# Patient Record
Sex: FEMALE | Race: WHITE | Employment: OTHER | URBAN - METROPOLITAN AREA
[De-identification: names, ages, dates, MRNs, and addresses within clinical notes are randomized per-mention and may not be internally consistent; named-entity substitution may affect disease eponyms.]

---

## 2017-01-23 ENCOUNTER — TRANSFERRED RECORDS (OUTPATIENT)
Dept: HEALTH INFORMATION MANAGEMENT | Facility: CLINIC | Age: 82
End: 2017-01-23

## 2017-02-08 ENCOUNTER — OFFICE VISIT (OUTPATIENT)
Dept: INTERNAL MEDICINE | Facility: CLINIC | Age: 82
End: 2017-02-08
Payer: COMMERCIAL

## 2017-02-08 VITALS
RESPIRATION RATE: 12 BRPM | HEART RATE: 72 BPM | BODY MASS INDEX: 25.21 KG/M2 | DIASTOLIC BLOOD PRESSURE: 60 MMHG | TEMPERATURE: 97.8 F | HEIGHT: 62 IN | WEIGHT: 137 LBS | OXYGEN SATURATION: 98 % | SYSTOLIC BLOOD PRESSURE: 124 MMHG

## 2017-02-08 DIAGNOSIS — I10 BENIGN HYPERTENSION: Primary | ICD-10-CM

## 2017-02-08 DIAGNOSIS — H61.21 IMPACTED CERUMEN OF RIGHT EAR: ICD-10-CM

## 2017-02-08 DIAGNOSIS — R09.81 NASAL CONGESTION: ICD-10-CM

## 2017-02-08 PROCEDURE — 99214 OFFICE O/P EST MOD 30 MIN: CPT | Performed by: FAMILY MEDICINE

## 2017-02-08 RX ORDER — HYDROCHLOROTHIAZIDE 25 MG/1
25 TABLET ORAL DAILY
Qty: 30 TABLET | Refills: 1 | Status: SHIPPED | OUTPATIENT
Start: 2017-02-08 | End: 2017-03-16

## 2017-02-08 RX ORDER — LORATADINE 10 MG/1
10 TABLET ORAL DAILY
Qty: 30 TABLET | Refills: 1 | COMMUNITY
Start: 2017-02-08 | End: 2017-04-10

## 2017-02-08 RX ORDER — METOPROLOL SUCCINATE 25 MG/1
25 TABLET, EXTENDED RELEASE ORAL DAILY
Qty: 30 TABLET | Refills: 1 | Status: CANCELLED | OUTPATIENT
Start: 2017-02-08

## 2017-02-08 NOTE — PROGRESS NOTES
Chief Complaint   Patient presents with     RECHECK     Follow up to January 2017 office visit. No change in sx.

## 2017-02-08 NOTE — PROGRESS NOTES
"CHIEF COMPLAINT    Head congestion, post nasal drip, hoarse      HISTORY    She has had persistent although somewhat improved SX. I saw her about 2 months ago. Singular, Calritin recommended.    Note she is on Losartan having been switched from Lisinopril due to cough.    She has been running lowish BP's.    R ear plugged.    Patient Active Problem List   Diagnosis     Advanced directives, counseling/discussion     Benign hypertension     Hyperlipidemia LDL goal <130     Neuropathy (H)     Preventative health care     Osteopenia     Abnormal finding on thyroid function test     Thyroid nodule     Subclinical hypothyroidism       REVIEW OF SYSTEMS    No fever  No HA  No wheeze or SOB  No edema      Past Medical History   Diagnosis Date     Hypertension      Hyperlipidemia      Neuropathy (H)      Abnormal finding on thyroid function test 11/10/2015     Thyroid nodule 11/10/2015     Subclinical hypothyroidism 11/10/2015       EXAM  /60 mmHg  Pulse 72  Temp(Src) 97.8  F (36.6  C) (Oral)  Resp 12  Ht 5' 2\" (1.575 m)  Wt 137 lb (62.143 kg)  BMI 25.05 kg/m2  SpO2 98%    R ear: impacted cerumen, irrigated, TM is WNL  L ear: WNL  Phar: neg  Voice sl hoarse  Neck: no mass or adenopathy  Chest: no distress      (I10) Benign hypertension  (primary encounter diagnosis)  Comment:   Will D/C Losartan on trial basis  Plan: hydrochlorothiazide (HYDRODIURIL) 25 MG tablet            (R09.81) Nasal congestion  Comment: see above, persistent  Plan: ENT if not improving    (H61.21) Impacted cerumen of right ear  Comment: resolved  Plan:       Note change of medication.    Recheck in 3-4 weeks.      "

## 2017-02-08 NOTE — NURSING NOTE
"Chief Complaint   Patient presents with     RECHECK     Follow up to January 2017 office visit. No change in sx.        Initial /60 mmHg  Pulse 72  Temp(Src) 97.8  F (36.6  C) (Oral)  Resp 12  Ht 5' 2\" (1.575 m)  Wt 137 lb (62.143 kg)  BMI 25.05 kg/m2  SpO2 98% Estimated body mass index is 25.05 kg/(m^2) as calculated from the following:    Height as of this encounter: 5' 2\" (1.575 m).    Weight as of this encounter: 137 lb (62.143 kg).  Medication Reconciliation: complete     MARIIA Santiago      "

## 2017-02-08 NOTE — MR AVS SNAPSHOT
"              After Visit Summary   2017    Vicky Kang    MRN: [id number]           Patient Information     Date Of Birth          1935        Visit Information        Provider Department      2017 9:20 AM Boyd Mcdonnell MD [facility]        Today's Diagnoses     Benign hypertension    -  1     Nasal congestion         Impacted cerumen of right ear           Care Instructions    Note change of medication.    Recheck in 3-4 weeks.          Follow-ups after your visit        Who to contact     If you have questions or need follow up information about today's clinic visit or your schedule please contact [facility] directly at [phone].  Normal or non-critical lab and imaging results will be communicated to you by MyChart, letter or phone within 4 business days after the clinic has received the results. If you do not hear from us within 7 days, please contact the clinic through MyChart or phone. If you have a critical or abnormal lab result, we will notify you by phone as soon as possible.  Submit refill requests through Minekey or call your pharmacy and they will forward the refill request to us. Please allow 3 business days for your refill to be completed.          Additional Information About Your Visit        MyChart Information     Minekey lets you send messages to your doctor, view your test results, renew your prescriptions, schedule appointments and more. To sign up, go to [email or web address] . Click on \"Log in\" on the left side of the screen, which will take you to the Welcome page. Then click on \"Sign up Now\" on the right side of the page.     You will be asked to enter the access code listed below, as well as some personal information. Please follow the directions to create your username and password.     Your access code is: [email or web address]  Expires: 2017  9:43 AM     Your access code will  in 90 days. If you need help or a new code, " "please call your [facility] clinic or [phone].        Care EveryWhere ID     This is your Care EveryWhere ID. This could be used by other organizations to access your [facility] medical records  [id number]        Your Vitals Were     Pulse Temperature Respirations Height BMI (Body Mass Index) Pulse Oximetry    72 97.8  F (36.6  C) (Oral) 12 5' 2\" (1.575 m) 25.05 kg/m2 98%       Blood Pressure from Last 3 Encounters:   02/08/17 124/60   12/12/16 124/72   09/30/16 124/64    Weight from Last 3 Encounters:   02/08/17 137 lb (62.143 kg)   12/12/16 138 lb (62.596 kg)   09/30/16 136 lb (61.689 kg)              Today, you had the following     No orders found for display         Today's Medication Changes          These changes are accurate as of: 2/8/17  9:43 AM.  If you have any questions, ask your nurse or doctor.               Start taking these medicines.        Dose/Directions    hydrochlorothiazide 25 MG tablet   Commonly known as:  HYDRODIURIL   Used for:  Benign hypertension   Replaces:  losartan-hydrochlorothiazide 50-12.5 MG per tablet   Started by:  Boyd Mcdonnell MD        Dose:  25 mg   Take 1 tablet (25 mg) by mouth daily   Quantity:  30 tablet   Refills:  1         Stop taking these medicines if you haven't already. Please contact your care team if you have questions.     azelastine 0.1 % spray   Commonly known as:  ASTELIN   Stopped by:  Boyd Mcdonnell MD           fluticasone 50 MCG/ACT spray   Commonly known as:  FLONASE   Stopped by:  Boyd Mcdonnell MD           losartan-hydrochlorothiazide 50-12.5 MG per tablet   Commonly known as:  HYZAAR   Replaced by:  hydrochlorothiazide 25 MG tablet   Stopped by:  Boyd Mcdonnell MD                Where to get your medicines      These medications were sent to [facility] [address] [place] [address] - [id number] [place] [address]  [id number] [place] [address]     Phone:  [phone]    - hydrochlorothiazide 25 MG tablet             " Primary Care Provider Office Phone # Fax #    Claire Gross [phone]       [facility] [address] [facility] [address]        Thank you!     Thank you for choosing [facility]  for your care. Our goal is always to provide you with excellent care. Hearing back from our patients is one way we can continue to improve our services. Please take a few minutes to complete the written survey that you may receive in the mail after your visit with us. Thank you!             Your Updated Medication List - Protect others around you: Learn how to safely use, store and throw away your medicines at www.disposemymeds.org.          This list is accurate as of: 2/8/17  9:43 AM.  Always use your most recent med list.                   Brand Name Dispense Instructions for use    amLODIPine 5 MG tablet    NORVASC    90 tablet    Take 1 tablet (5 mg) by mouth daily       aspirin 81 MG tablet      Take  by mouth daily.       CALCIUM 600 + D PO      Take  by mouth 2 times daily.       CLARITIN 10 MG tablet   Generic drug:  loratadine     30 tablet    Take 1 tablet (10 mg) by mouth daily       hydrochlorothiazide 25 MG tablet    HYDRODIURIL    30 tablet    Take 1 tablet (25 mg) by mouth daily       metoprolol 50 MG 24 hr tablet    TOPROL-XL    90 tablet    Take 1 tablet (50 mg) by mouth daily       montelukast 10 MG tablet    SINGULAIR    30 tablet    Take 1 tablet (10 mg) by mouth At Bedtime       simvastatin 20 MG tablet    ZOCOR    90 tablet    Take 1 tablet (20 mg) by mouth At Bedtime

## 2017-03-16 ENCOUNTER — OFFICE VISIT (OUTPATIENT)
Dept: INTERNAL MEDICINE | Facility: CLINIC | Age: 82
End: 2017-03-16
Payer: COMMERCIAL

## 2017-03-16 VITALS
OXYGEN SATURATION: 93 % | WEIGHT: 140 LBS | BODY MASS INDEX: 25.76 KG/M2 | SYSTOLIC BLOOD PRESSURE: 120 MMHG | HEIGHT: 62 IN | TEMPERATURE: 97.9 F | DIASTOLIC BLOOD PRESSURE: 60 MMHG | HEART RATE: 88 BPM

## 2017-03-16 DIAGNOSIS — I10 BENIGN HYPERTENSION: ICD-10-CM

## 2017-03-16 DIAGNOSIS — R49.0 HOARSENESS: Primary | ICD-10-CM

## 2017-03-16 DIAGNOSIS — R09.82 POST-NASAL DRIP: ICD-10-CM

## 2017-03-16 PROCEDURE — 99213 OFFICE O/P EST LOW 20 MIN: CPT | Performed by: FAMILY MEDICINE

## 2017-03-16 RX ORDER — MONTELUKAST SODIUM 10 MG/1
10 TABLET ORAL AT BEDTIME
Qty: 30 TABLET | Refills: 5 | Status: SHIPPED | OUTPATIENT
Start: 2017-03-16 | End: 2018-09-26

## 2017-03-16 RX ORDER — HYDROCHLOROTHIAZIDE 25 MG/1
25 TABLET ORAL DAILY
Qty: 30 TABLET | Refills: 5 | Status: SHIPPED | OUTPATIENT
Start: 2017-03-16 | End: 2017-10-30

## 2017-03-16 NOTE — NURSING NOTE
"Chief Complaint   Patient presents with     RECHECK       Initial /60  Pulse 88  Temp 97.9  F (36.6  C) (Oral)  Ht 5' 2\" (1.575 m)  Wt 140 lb (63.5 kg)  SpO2 93%  BMI 25.61 kg/m2 Estimated body mass index is 25.61 kg/(m^2) as calculated from the following:    Height as of this encounter: 5' 2\" (1.575 m).    Weight as of this encounter: 140 lb (63.5 kg).  Medication Reconciliation: complete    "

## 2017-03-16 NOTE — MR AVS SNAPSHOT
After Visit Summary   3/16/2017    Vicky Kang    MRN: [id number]           Patient Information     Date Of Birth          1935        Visit Information        Provider Department      3/16/2017 10:40 AM Boyd Mcdonnell MD [facility]        Today's Diagnoses     Hoarseness    -  1    Benign hypertension        Post-nasal drip          Care Instructions      Continue present meds.    See ENT Doctor.        Follow-ups after your visit        Additional Services     OTOLARYNGOLOGY REFERRAL       Your provider has referred you to: [facility]: [facility] [phone]   [email or web address]    Please be aware that coverage of these services is subject to the terms and limitations of your health insurance plan.  Call member services at your health plan with any benefit or coverage questions.      Please bring the following with you to your appointment:    (1) Any X-Rays, CTs or MRIs which have been performed.  Contact the facility where they were done to arrange for  prior to your scheduled appointment.   (2) List of current medications  (3) This referral request   (4) Any documents/labs given to you for this referral                  Who to contact     If you have questions or need follow up information about today's clinic visit or your schedule please contact [facility] directly at [phone].  Normal or non-critical lab and imaging results will be communicated to you by MyChart, letter or phone within 4 business days after the clinic has received the results. If you do not hear from us within 7 days, please contact the clinic through MyChart or phone. If you have a critical or abnormal lab result, we will notify you by phone as soon as possible.  Submit refill requests through StepOne or call your pharmacy and they will forward the refill request to us. Please allow 3 business days for your refill to be  "completed.          Additional Information About Your Visit        SNUPI TechnologiesharWalkMe Information     [facility] How To lets you send messages to your doctor, view your test results, renew your prescriptions, schedule appointments and more. To sign up, go to [email or web address] . Click on \"Log in\" on the left side of the screen, which will take you to the Welcome page. Then click on \"Sign up Now\" on the right side of the page.     You will be asked to enter the access code listed below, as well as some personal information. Please follow the directions to create your username and password.     Your access code is: [email or web address]  Expires: 2017 10:43 AM     Your access code will  in 90 days. If you need help or a new code, please call your [facility] or [phone].        Care EveryWhere ID     This is your [facility] EveryWhere ID. This could be used by other organizations to access your [facility] medical records  [id number]        Your Vitals Were     Pulse Temperature Height Pulse Oximetry BMI (Body Mass Index)       88 97.9  F (36.6  C) (Oral) 5' 2\" (1.575 m) 93% 25.61 kg/m2        Blood Pressure from Last 3 Encounters:   17 120/60   17 124/60   16 124/72    Weight from Last 3 Encounters:   17 140 lb (63.5 kg)   17 137 lb (62.1 kg)   16 138 lb (62.6 kg)              We Performed the Following     OTOLARYNGOLOGY REFERRAL          Where to get your medicines      These medications were sent to [facility] [address] [place] [id number] [facility]  [address] [facility] [address]     Phone:  [phone]     hydrochlorothiazide 25 MG tablet    montelukast 10 MG tablet          Primary Care Provider Office Phone # Fax #    Claire Gross [phone]       [facility] [address] [place] [address]        Thank you!     Thank you for choosing [facility]  for your care. Our goal is always to provide you with " excellent care. Hearing back from our patients is one way we can continue to improve our services. Please take a few minutes to complete the written survey that you may receive in the mail after your visit with us. Thank you!             Your Updated Medication List - Protect others around you: Learn how to safely use, store and throw away your medicines at www.disposemymeds.org.          This list is accurate as of: 3/16/17 11:03 AM.  Always use your most recent med list.                   Brand Name Dispense Instructions for use    amLODIPine 5 MG tablet    NORVASC    90 tablet    Take 1 tablet (5 mg) by mouth daily       CALCIUM 600 + D PO      Take  by mouth 2 times daily.       CLARITIN 10 MG tablet   Generic drug:  loratadine     30 tablet    Take 1 tablet (10 mg) by mouth daily       hydrochlorothiazide 25 MG tablet    HYDRODIURIL    30 tablet    Take 1 tablet (25 mg) by mouth daily       metoprolol 50 MG 24 hr tablet    TOPROL-XL    90 tablet    Take 1 tablet (50 mg) by mouth daily       montelukast 10 MG tablet    SINGULAIR    30 tablet    Take 1 tablet (10 mg) by mouth At Bedtime       simvastatin 20 MG tablet    ZOCOR    90 tablet    Take 1 tablet (20 mg) by mouth At Bedtime

## 2017-03-20 NOTE — PROGRESS NOTES
"CHIEF COMPLAINT    Head congestion, hoarseness      HISTORY    Vicky has had SX for 4-5 months. She has post nasal and nasal drainage. Somewhat better with Singulair, Antihistamine. She notes persistent hoarseness. No sinus HA. No ST    Patient Active Problem List   Diagnosis     Advanced directives, counseling/discussion     Benign hypertension     Hyperlipidemia LDL goal <130     Neuropathy (H)     Preventative health care     Osteopenia     Abnormal finding on thyroid function test     Thyroid nodule     Subclinical hypothyroidism     Current Outpatient Prescriptions   Medication Sig Dispense Refill     hydrochlorothiazide (HYDRODIURIL) 25 MG tablet Take 1 tablet (25 mg) by mouth daily 30 tablet 5     montelukast (SINGULAIR) 10 MG tablet Take 1 tablet (10 mg) by mouth At Bedtime 30 tablet 5     loratadine (CLARITIN) 10 MG tablet Take 1 tablet (10 mg) by mouth daily 30 tablet 1     metoprolol (TOPROL-XL) 50 MG 24 hr tablet Take 1 tablet (50 mg) by mouth daily 90 tablet 2     amLODIPine (NORVASC) 5 MG tablet Take 1 tablet (5 mg) by mouth daily 90 tablet 2     simvastatin (ZOCOR) 20 MG tablet Take 1 tablet (20 mg) by mouth At Bedtime 90 tablet 3     Calcium Carbonate-Vitamin D (CALCIUM 600 + D OR) Take  by mouth 2 times daily.         REVIEW OF SYSTEMS    No fever  No wheeze or SOB      Past Medical History   Diagnosis Date     Abnormal finding on thyroid function test 11/10/2015     Hyperlipidemia      Hypertension      Neuropathy (H)      Subclinical hypothyroidism 11/10/2015     Thyroid nodule 11/10/2015       EXAM  /60  Pulse 88  Temp 97.9  F (36.6  C) (Oral)  Ht 5' 2\" (1.575 m)  Wt 140 lb (63.5 kg)  SpO2 93%  BMI 25.61 kg/m2    Congested nasal membranes  Pharynx unremarkable   Neck no adenopathy  Voice is sl hoarse      (R49.0) Hoarseness  (primary encounter diagnosis)  Comment:   Plan: OTOLARYNGOLOGY REFERRAL            (I10) Benign hypertension  Comment: refill  Plan: hydrochlorothiazide (HYDRODIURIL) " 25 MG tablet            (R09.82) Post-nasal drip  Comment:   Plan: montelukast (SINGULAIR) 10 MG tablet,         OTOLARYNGOLOGY REFERRAL          Continue present meds.    See ENT Doctor.

## 2017-03-31 ENCOUNTER — TRANSFERRED RECORDS (OUTPATIENT)
Dept: HEALTH INFORMATION MANAGEMENT | Facility: CLINIC | Age: 82
End: 2017-03-31

## 2017-04-10 ENCOUNTER — OFFICE VISIT (OUTPATIENT)
Dept: INTERNAL MEDICINE | Facility: CLINIC | Age: 82
End: 2017-04-10
Payer: COMMERCIAL

## 2017-04-10 VITALS
WEIGHT: 139 LBS | HEART RATE: 95 BPM | BODY MASS INDEX: 25.58 KG/M2 | SYSTOLIC BLOOD PRESSURE: 112 MMHG | TEMPERATURE: 98.4 F | DIASTOLIC BLOOD PRESSURE: 72 MMHG | HEIGHT: 62 IN | OXYGEN SATURATION: 94 %

## 2017-04-10 DIAGNOSIS — Z01.818 PREOPERATIVE EXAMINATION: Primary | ICD-10-CM

## 2017-04-10 LAB — HGB BLD-MCNC: 13.7 G/DL (ref 11.7–15.7)

## 2017-04-10 PROCEDURE — 80051 ELECTROLYTE PANEL: CPT | Performed by: INTERNAL MEDICINE

## 2017-04-10 PROCEDURE — 82565 ASSAY OF CREATININE: CPT | Performed by: INTERNAL MEDICINE

## 2017-04-10 PROCEDURE — 93000 ELECTROCARDIOGRAM COMPLETE: CPT | Performed by: INTERNAL MEDICINE

## 2017-04-10 PROCEDURE — 84520 ASSAY OF UREA NITROGEN: CPT | Performed by: INTERNAL MEDICINE

## 2017-04-10 PROCEDURE — 99215 OFFICE O/P EST HI 40 MIN: CPT | Performed by: INTERNAL MEDICINE

## 2017-04-10 PROCEDURE — 36415 COLL VENOUS BLD VENIPUNCTURE: CPT | Performed by: INTERNAL MEDICINE

## 2017-04-10 PROCEDURE — 85018 HEMOGLOBIN: CPT | Performed by: INTERNAL MEDICINE

## 2017-04-10 NOTE — MR AVS SNAPSHOT
"              After Visit Summary   4/10/2017    Vicky Kang    MRN: [id number]           Patient Information     Date Of Birth          1935        Visit Information        Provider Department      4/10/2017 11:20 AM Mary Jackson MD [facility]        Today's Diagnoses     Preoperative examination    -  1       Follow-ups after your visit        Who to contact     If you have questions or need follow up information about today's clinic visit or your schedule please contact [facility] directly at [phone].  Normal or non-critical lab and imaging results will be communicated to you by Netcipiahart, letter or phone within 4 business days after the clinic has received the results. If you do not hear from us within 7 days, please contact the clinic through Netcipiahart or phone. If you have a critical or abnormal lab result, we will notify you by phone as soon as possible.  Submit refill requests through Wiki-PR or call your pharmacy and they will forward the refill request to us. Please allow 3 business days for your refill to be completed.          Additional Information About Your Visit        MyChart Information     Wiki-PR lets you send messages to your doctor, view your test results, renew your prescriptions, schedule appointments and more. To sign up, go to [email or web address] . Click on \"Log in\" on the left side of the screen, which will take you to the Welcome page. Then click on \"Sign up Now\" on the right side of the page.     You will be asked to enter the access code listed below, as well as some personal information. Please follow the directions to create your username and password.     Your access code is: [email or web address]  Expires: 2017 10:43 AM     Your access code will  in 90 days. If you need help or a new code, please call your [facility] or [phone].        Care EveryWhere ID     This is your Care EveryWhere ID. This could be used by " "other organizations to access your [place] medical records  [id number]        Your Vitals Were     Pulse Temperature Height Pulse Oximetry Breastfeeding? BMI (Body Mass Index)    95 98.4  F (36.9  C) (Oral) 5' 2\" (1.575 m) 94% No 25.42 kg/m2       Blood Pressure from Last 3 Encounters:   04/10/17 112/72   03/16/17 120/60   02/08/17 124/60    Weight from Last 3 Encounters:   04/10/17 139 lb (63 kg)   03/16/17 140 lb (63.5 kg)   02/08/17 137 lb (62.1 kg)              We Performed the Following     Creatinine     EKG 12-lead complete w/read - Clinics     Electrolyte panel     Hemoglobin     Urea nitrogen          Today's Medication Changes          These changes are accurate as of: 4/10/17 12:06 PM.  If you have any questions, ask your nurse or doctor.               Stop taking these medicines if you haven't already. Please contact your care team if you have questions.     CLARITIN 10 MG tablet   Generic drug:  loratadine   Stopped by:  Mary Jackson MD                    Primary Care Provider Office Phone # Fax #    Claire Hira Gross [phone]       [facility] [address] [place] [address]        Thank you!     Thank you for choosing [facility]  for your care. Our goal is always to provide you with excellent care. Hearing back from our patients is one way we can continue to improve our services. Please take a few minutes to complete the written survey that you may receive in the mail after your visit with us. Thank you!             Your Updated Medication List - Protect others around you: Learn how to safely use, store and throw away your medicines at www.disposemymeds.org.          This list is accurate as of: 4/10/17 12:06 PM.  Always use your most recent med list.                   Brand Name Dispense Instructions for use    amLODIPine 5 MG tablet    NORVASC    90 tablet    Take 1 tablet (5 mg) by mouth daily       CALCIUM 600 + D PO      " Take  by mouth 2 times daily.       hydrochlorothiazide 25 MG tablet    HYDRODIURIL    30 tablet    Take 1 tablet (25 mg) by mouth daily       metoprolol 50 MG 24 hr tablet    TOPROL-XL    90 tablet    Take 1 tablet (50 mg) by mouth daily       montelukast 10 MG tablet    SINGULAIR    30 tablet    Take 1 tablet (10 mg) by mouth At Bedtime       simvastatin 20 MG tablet    ZOCOR    90 tablet    Take 1 tablet (20 mg) by mouth At Bedtime

## 2017-04-10 NOTE — PROGRESS NOTES
[place] [address]  [facility] [address]  [phone]  Dept: [phone]    PRE-OP EVALUATION:  Today's date: 4/10/2017    Vicky Kang (: 1935) presents for pre-operative evaluation assessment as requested by Dr. Gavin Sharma.  She requires evaluation and anesthesia risk assessment prior to undergoing surgery/procedure for treatment of Hoarseness .  Proposed procedure: vocal cord cyst/polyp removal    Date of Surgery/ Procedure: 17  Time of Surgery/ Procedure: [address] [place] [address]/Surgical Facility: [facility]  Primary Physician: Claire Gross  Type of Anesthesia Anticipated: General    Patient has a Health Care Directive or Living Will:  YES     1. NO - Do you have a history of heart attack, stroke, stent, bypass or surgery on an artery in the head, neck, heart or legs?  2. NO - Do you ever have any pain or discomfort in your chest?  3. NO - Do you have a history of  Heart Failure?  4. NO - Are you troubled by shortness of breath when: walking on the level, up a slight hill or at night?  5. NO - Do you currently have a cold, bronchitis or other respiratory infection?  6. NO - Do you have a cough, shortness of breath or wheezing?  7. NO - Do you sometimes get pains in the calves of your legs when you walk?  8. NO - Do you or anyone in your family have previous history of blood clots?  9. NO - Do you or does anyone in your family have a serious bleeding problem such as prolonged bleeding following surgeries or cuts?  10. NO - Have you ever had problems with anemia or been told to take iron pills?  11. NO - Have you had any abnormal blood loss such as black, tarry or bloody stools, or abnormal vaginal bleeding?  12. NO - Have you ever had a blood transfusion?  13. NO - Have you or any of your relatives ever had problems with anesthesia?  14. NO - Do you have sleep apnea, excessive snoring or daytime drowsiness?  15. NO - Do you have any prosthetic  heart valves?  16. NO - Do you have prosthetic joints?  17. NO - Is there any chance that you may be pregnant?      HPI:                                                          HYPERTENSION - Patient has longstanding history of  HTN , currently denies any symptoms referable to elevated blood pressure. Specifically denies chest pain, palpitations, dyspnea, orthopnea, PND or peripheral edema. Blood pressure readings have been in normal range. Current medication regimen is as listed below. Patient denies any side effects of medication.                                                                                                                                                                                          .  HYPERLIPIDEMIA - Patient has a  history of  Hyperlipidemia requiring medication for treatment,. Patient reports no problems or side effects with the medication.                                                                                                                                                       .  HYPOTHYROIDISM - Patient has a longstanding history of chronic Hypothyroidism. Patient has been doing well, noting no tremor, insomnia, hair loss or changes in skin texture. Last TSH value of 2.20 ( 09/16) . Continues to take medications as directed, without adverse reactions or side effects.                                                                                                                                                                                                                        .    MEDICAL HISTORY:                                                      Patient Active Problem List    Diagnosis Date Noted     Abnormal finding on thyroid function test 11/10/2015     Priority: Medium     Thyroid nodule 11/10/2015     Priority: Medium     Subclinical hypothyroidism 11/10/2015     Priority: Medium     Advanced directives, counseling/discussion 04/24/2013     Priority: Medium      Patient states has Advance Directive and will bring in a copy to clinic.       Benign hypertension 04/24/2013     Priority: Medium     Hyperlipidemia LDL goal <130 04/24/2013     Priority: Medium     Neuropathy (H) 04/24/2013     Priority: Medium     Preventative health care 04/24/2013     Priority: Medium     Osteopenia 04/24/2013     Priority: Medium        Past Medical History:   Diagnosis Date     Abnormal finding on thyroid function test 11/10/2015     Hyperlipidemia      Hypertension      Neuropathy (H)      Subclinical hypothyroidism 11/10/2015     Thyroid nodule 11/10/2015       Past Surgical History:   Procedure Laterality Date     CATARACT IOL, RT/LT       PHACOEMULSIFICATION CLEAR CORNEA WITH STANDARD INTRAOCULAR LENS IMPLANT  1/5/2012    Procedure:PHACOEMULSIFICATION CLEAR CORNEA WITH STANDARD INTRAOCULAR LENS IMPLANT; LEFT PHACOEMULSIFICATION CLEAR CORNEA WITH STANDARD LENS IMPLANT ; Surgeon:TUNDE GARDNER; Location: [facility]     VITRECTOMY ANTERIOR         Current Outpatient Prescriptions   Medication Sig Dispense Refill     hydrochlorothiazide (HYDRODIURIL) 25 MG tablet Take 1 tablet (25 mg) by mouth daily 30 tablet 5     montelukast (SINGULAIR) 10 MG tablet Take 1 tablet (10 mg) by mouth At Bedtime 30 tablet 5     loratadine (CLARITIN) 10 MG tablet Take 1 tablet (10 mg) by mouth daily 30 tablet 1     metoprolol (TOPROL-XL) 50 MG 24 hr tablet Take 1 tablet (50 mg) by mouth daily 90 tablet 2     amLODIPine (NORVASC) 5 MG tablet Take 1 tablet (5 mg) by mouth daily 90 tablet 2     simvastatin (ZOCOR) 20 MG tablet Take 1 tablet (20 mg) by mouth At Bedtime 90 tablet 3     Calcium Carbonate-Vitamin D (CALCIUM 600 + D OR) Take  by mouth 2 times daily.         OTC products: None, except as noted above      No Known Allergies       Latex Allergy: NO    Social History   Substance Use Topics     Smoking status: Never Smoker     Smokeless tobacco: Never Used     Alcohol use No     History   Drug Use No  "      REVIEW OF SYSTEMS:                                                    C: NEGATIVE for fever, chills, change in weight  I: NEGATIVE for worrisome rashes, moles or lesions  E: NEGATIVE for vision changes or irritation  E/M: NEGATIVE for ear, mouth and throat problems  R: NEGATIVE for significant cough or SOB  B: NEGATIVE for masses, tenderness or discharge  CV: NEGATIVE for chest pain, palpitations or peripheral edema  GI: NEGATIVE for nausea, abdominal pain, heartburn, or change in bowel habits  : NEGATIVE for frequency, dysuria, or hematuria  M: NEGATIVE for significant arthralgias or myalgia  N: NEGATIVE for weakness, dizziness or paresthesias  E: NEGATIVE for temperature intolerance, skin/hair changes  H: NEGATIVE for bleeding problems  P: NEGATIVE for changes in mood or affect    EXAM:                                                    /72 (BP Location: Right arm, Cuff Size: Adult Regular)  Pulse 95  Temp 98.4  F (36.9  C) (Oral)  Ht 5' 2\" (1.575 m)  Wt 139 lb (63 kg)  SpO2 94%  Breastfeeding? No  BMI 25.42 kg/m2    GENERAL APPEARANCE: healthy, alert and no distress     EYES: EOMI, PERRL     HENT: ear canals and TM's normal and nose and mouth without ulcers or lesions     NECK: no adenopathy, no asymmetry, masses, or scars and thyroid normal to palpation     RESP: lungs clear to auscultation - no rales, rhonchi or wheezes     CV: regular rates and rhythm, normal S1 S2, no S3 or S4 and no murmur, click or rub     ABDOMEN:  soft, nontender, no HSM or masses and bowel sounds normal     MS: extremities normal- no gross deformities noted, no evidence of inflammation in joints, FROM in all extremities.     NEURO: Normal strength and tone, sensory exam grossly normal, mentation intact and speech normal     PSYCH: mentation appears normal. and affect normal/bright       DIAGNOSTICS:                                                    EKG: appears normal, NSR, normal axis, normal intervals, no LVH by " voltage criteria, unchanged from previous tracings  Hemoglobin pending  Serum Potassium pending    Recent Labs   Lab Test  09/30/16   1550  08/16/15   0730  07/16/15   1603   HGB   --   15.3  14.9   PLT   --   289  319   NA  139  140   --    POTASSIUM  3.7  3.4   --    CR  0.67  0.59   --         IMPRESSION:                                                        (Z01.818) Preoperative examination  (primary encounter diagnosis)  Comment: vocal cord cyst/polyp removal  Plan: EKG 12-lead complete w/read - Clinics,         Hemoglobin, Electrolyte panel, Urea nitrogen,         Creatinine          The proposed surgical procedure is considered LOW risk.    REVISED CARDIAC RISK INDEX  The patient has the following serious cardiovascular risks for perioperative complications such as (MI, PE, VFib and 3  AV Block):  No serious cardiac risks    The patient has the following additional risks for perioperative complications:  No identified additional risks      ICD-10-CM    1. Preoperative examination Z01.818 EKG 12-lead complete w/read - Clinics     Hemoglobin     Electrolyte panel     Urea nitrogen     Creatinine       RECOMMENDATIONS:                                                                            Anticoagulant or Antiplatelet Medication Use  ASPIRIN: Discontinue ASA 7 days prior to procedure to reduce bleeding risk.    NSAIDS: Stop  3 days prior to surgery      Advised to continue metoprolol on the am of surgery   Instruction given on all meds.         Signed Electronically by: Mary Jackson MD    Copy of this evaluation report is provided to requesting physician.    [facility] Preop Guidelines

## 2017-04-10 NOTE — LETTER
[facility]  [address], [address]  [place]  [address]      April 14, 2017      Vicky Kang                                                                                                                              [address] [address]            Dear Vicky,    The results of your recent tests were normal.  Enclosed is a copy of the results.      If you have any questions or concerns, please contact our office at [phone].        Sincerely,      Christopher Jackson M.D.

## 2017-04-10 NOTE — NURSING NOTE
"Chief Complaint   Patient presents with     Pre-Op Exam     [facility], 4/19/17 vocal cord       Initial /72 (BP Location: Right arm, Cuff Size: Adult Regular)  Pulse 95  Temp 98.4  F (36.9  C) (Oral)  Ht 5' 2\" (1.575 m)  Wt 139 lb (63 kg)  SpO2 94%  Breastfeeding? No  BMI 25.42 kg/m2 Estimated body mass index is 25.42 kg/(m^2) as calculated from the following:    Height as of this encounter: 5' 2\" (1.575 m).    Weight as of this encounter: 139 lb (63 kg).  Medication Reconciliation: complete   Soheila Bui CMA      "

## 2017-04-11 LAB
ANION GAP SERPL CALCULATED.3IONS-SCNC: 8 MMOL/L (ref 3–14)
BUN SERPL-MCNC: 14 MG/DL (ref 7–30)
CHLORIDE SERPL-SCNC: 104 MMOL/L (ref 94–109)
CO2 SERPL-SCNC: 28 MMOL/L (ref 20–32)
CREAT SERPL-MCNC: 0.59 MG/DL (ref 0.52–1.04)
GFR SERPL CREATININE-BSD FRML MDRD: NORMAL ML/MIN/1.7M2
POTASSIUM SERPL-SCNC: 3.4 MMOL/L (ref 3.4–5.3)
SODIUM SERPL-SCNC: 140 MMOL/L (ref 133–144)

## 2017-04-19 ENCOUNTER — HOSPITAL PATHOLOGY (OUTPATIENT)
Dept: OTHER | Facility: CLINIC | Age: 82
End: 2017-04-19

## 2017-04-20 LAB — COPATH REPORT: NORMAL

## 2017-05-05 ENCOUNTER — TRANSFERRED RECORDS (OUTPATIENT)
Dept: HEALTH INFORMATION MANAGEMENT | Facility: CLINIC | Age: 82
End: 2017-05-05

## 2017-09-05 DIAGNOSIS — I10 BENIGN HYPERTENSION: ICD-10-CM

## 2017-09-05 RX ORDER — AMLODIPINE BESYLATE 5 MG/1
TABLET ORAL
Qty: 90 TABLET | Refills: 0 | Status: SHIPPED | OUTPATIENT
Start: 2017-09-05 | End: 2017-12-21

## 2017-09-05 NOTE — TELEPHONE ENCOUNTER
AMLODIPINE      Last Written Prescription Date: 12/01/16  Last Fill Quantity: 90, # refills: 2  Last Office Visit with [facility], [facility] or [facility] prescribing provider: 04/10/17       Potassium   Date Value Ref Range Status   04/10/2017 3.4 3.4 - 5.3 mmol/L Final     Creatinine   Date Value Ref Range Status   04/10/2017 0.59 0.52 - 1.04 mg/dL Final     BP Readings from Last 3 Encounters:   04/10/17 112/72   03/16/17 120/60   02/08/17 124/60

## 2017-10-09 DIAGNOSIS — I10 BENIGN HYPERTENSION: ICD-10-CM

## 2017-10-09 DIAGNOSIS — E78.5 HYPERLIPIDEMIA LDL GOAL <130: ICD-10-CM

## 2017-10-09 RX ORDER — METOPROLOL SUCCINATE 50 MG/1
TABLET, EXTENDED RELEASE ORAL
Qty: 90 TABLET | Refills: 0 | Status: SHIPPED | OUTPATIENT
Start: 2017-10-09 | End: 2018-01-23

## 2017-10-09 RX ORDER — SIMVASTATIN 20 MG
TABLET ORAL
Qty: 90 TABLET | Refills: 0 | Status: SHIPPED | OUTPATIENT
Start: 2017-10-09 | End: 2018-01-12

## 2017-10-09 NOTE — TELEPHONE ENCOUNTER
Metoprolol      Last Written Prescription Date: 12/13/16  Last Fill Quantity: 90, # refills: 2    Last Office Visit with [facility], [facility] or  Health prescribing provider:  04/10/17 Pallegar   Future Office Visit:    Next 5 appointments (look out 90 days)     Oct 30, 2017 10:20 AM CDT   [facility] with [facility] Gross MD   [facility]    [address]  [facility] [address]   [phone]                    BP Readings from Last 3 Encounters:   04/10/17 112/72   03/16/17 120/60   02/08/17 124/60     Simvastatin     Last Written Prescription Date: 09/30/16  Last Fill Quantity: 90, # refills: 3  Last Office Visit with [facility], [facility] or  Health prescribing provider: 04/10/17 Pallegar  Next 5 appointments (look out 90 days)     Oct 30, 2017 10:20 AM CDT   [facility]   [facility]    [address]  [facility] [address]   [phone]                   Lab Results   Component Value Date    CHOL 160 09/30/2016     Lab Results   Component Value Date    HDL 40 09/30/2016     Lab Results   Component Value Date    LDL 67 09/30/2016     Lab Results   Component Value Date    TRIG 265 09/30/2016     Lab Results   Component Value Date    CHOLHDLRATIO 3.5 06/15/2015       Labs showing if normal/abnormal  Lab Results   Component Value Date    CHOL 160 09/30/2016    TRIG 265 (H) 09/30/2016    HDL 40 (L) 09/30/2016    LDL 67 09/30/2016    VLDL 55 (H) 06/15/2015    CHOLHDLRATIO 3.5 06/15/2015

## 2017-10-30 ENCOUNTER — OFFICE VISIT (OUTPATIENT)
Dept: INTERNAL MEDICINE | Facility: CLINIC | Age: 82
End: 2017-10-30
Payer: COMMERCIAL

## 2017-10-30 VITALS
BODY MASS INDEX: 25.32 KG/M2 | SYSTOLIC BLOOD PRESSURE: 130 MMHG | TEMPERATURE: 97.9 F | OXYGEN SATURATION: 100 % | HEART RATE: 61 BPM | DIASTOLIC BLOOD PRESSURE: 60 MMHG | HEIGHT: 62 IN | WEIGHT: 137.6 LBS

## 2017-10-30 DIAGNOSIS — Z00.00 ENCOUNTER FOR ROUTINE ADULT HEALTH EXAMINATION WITHOUT ABNORMAL FINDINGS: Primary | ICD-10-CM

## 2017-10-30 DIAGNOSIS — Z23 NEED FOR PROPHYLACTIC VACCINATION AND INOCULATION AGAINST INFLUENZA: ICD-10-CM

## 2017-10-30 DIAGNOSIS — I10 BENIGN HYPERTENSION: ICD-10-CM

## 2017-10-30 PROCEDURE — 90662 IIV NO PRSV INCREASED AG IM: CPT | Performed by: INTERNAL MEDICINE

## 2017-10-30 PROCEDURE — G0438 PPPS, INITIAL VISIT: HCPCS | Performed by: INTERNAL MEDICINE

## 2017-10-30 PROCEDURE — G0008 ADMIN INFLUENZA VIRUS VAC: HCPCS | Performed by: INTERNAL MEDICINE

## 2017-10-30 RX ORDER — LOSARTAN POTASSIUM AND HYDROCHLOROTHIAZIDE 12.5; 5 MG/1; MG/1
1 TABLET ORAL DAILY
Qty: 90 TABLET | Refills: 4 | Status: SHIPPED | OUTPATIENT
Start: 2017-10-30 | End: 2018-11-30

## 2017-10-30 NOTE — MR AVS SNAPSHOT
"              After Visit Summary   10/30/2017    Vicky Kang    MRN: [id number]           Patient Information     Date Of Birth          1935        Visit Information        Provider Department      10/30/2017 10:20 AM Claire Gross MD [facility]        Today's Diagnoses     Encounter for routine adult health examination without abnormal findings    -  1    Benign hypertension           Follow-ups after your visit        Who to contact     If you have questions or need follow up information about today's clinic visit or your schedule please contact [facility] directly at [phone].  Normal or non-critical lab and imaging results will be communicated to you by [facility], letter or phone within 4 business days after the clinic has received the results. If you do not hear from us within 7 days, please contact the clinic through [facility] or phone. If you have a critical or abnormal lab result, we will notify you by phone as soon as possible.  Submit refill requests through [employer] or call your pharmacy and they will forward the refill request to us. Please allow 3 business days for your refill to be completed.          Additional Information About Your Visit        MyChart Information     [employer] lets you send messages to your doctor, view your test results, renew your prescriptions, schedule appointments and more. To sign up, go to [email or web address] . Click on \"Log in\" on the left side of the screen, which will take you to the Welcome page. Then click on \"Sign up Now\" on the right side of the page.     You will be asked to enter the access code listed below, as well as some personal information. Please follow the directions to create your username and password.     Your access code is: [email or web address]  Expires: 2018 10:56 AM     Your access code will  in 90 days. If you need help or a new code, please call your [facility] or [phone].        Care " "EveryWhere ID     This is your Care EveryWhere ID. This could be used by other organizations to access your [place] medical records  [id number]        Your Vitals Were     Pulse Temperature Height Pulse Oximetry BMI (Body Mass Index)       61 97.9  F (36.6  C) (Oral) 5' 2\" (1.575 m) 100% 25.17 kg/m2        Blood Pressure from Last 3 Encounters:   10/30/17 130/60   04/10/17 112/72   03/16/17 120/60    Weight from Last 3 Encounters:   10/30/17 137 lb 9.6 oz (62.4 kg)   04/10/17 139 lb (63 kg)   03/16/17 140 lb (63.5 kg)              We Performed the Following     CBC with platelets differential     Comprehensive metabolic panel     Lipid panel reflex to direct LDL Fasting     TSH with free T4 reflex          Today's Medication Changes          These changes are accurate as of: 10/30/17 10:56 AM.  If you have any questions, ask your nurse or doctor.               Start taking these medicines.        Dose/Directions    losartan-hydrochlorothiazide 50-12.5 MG per tablet   Commonly known as:  HYZAAR   Used for:  Benign hypertension   Started by:  Claire Gross MD        Dose:  1 tablet   Take 1 tablet by mouth daily   Quantity:  90 tablet   Refills:  4         Stop taking these medicines if you haven't already. Please contact your care team if you have questions.     hydrochlorothiazide 25 MG tablet   Commonly known as:  HYDRODIURIL   Stopped by:  Claire Gross MD                Where to get your medicines      These medications were sent to [facility] [address] [place] [address]     Phone:  [phone]     losartan-hydrochlorothiazide 50-12.5 MG per tablet                Primary Care Provider Office Phone # Fax #    Claire Gross [phone]       [address] [place] [address]        Equal Access to Services     DAVID SANTOS AH: Herminia Gomez, andreina curyr, qacarlos yang, " fiorella hernandezmayra sherman'aan ah. So [facility] [phone].    ATENCIÓN: Si westonla josh, tiene a greer disposición servicios gratuitos de asistencia lingüística. Linda read [phone].    We comply with applicable federal civil rights laws and [place] laws. We do not discriminate on the basis of race, color, national origin, age, disability, sex, sexual orientation, or gender identity.            Thank you!     Thank you for choosing [facility]  for your care. Our goal is always to provide you with excellent care. Hearing back from our patients is one way we can continue to improve our services. Please take a few minutes to complete the written survey that you may receive in the mail after your visit with us. Thank you!             Your Updated Medication List - Protect others around you: Learn how to safely use, store and throw away your medicines at www.disposemymeds.org.          This list is accurate as of: 10/30/17 10:56 AM.  Always use your most recent med list.                   Brand Name Dispense Instructions for use Diagnosis    amLODIPine 5 MG tablet    NORVASC    90 tablet    TAKE ONE TABLET BY MOUTH ONCE DAILY    Benign hypertension       CALCIUM 600 + D PO      Take  by mouth 2 times daily.        losartan-hydrochlorothiazide 50-12.5 MG per tablet    HYZAAR    90 tablet    Take 1 tablet by mouth daily    Benign hypertension       metoprolol 50 MG 24 hr tablet    TOPROL-XL    90 tablet    TAKE ONE TABLET BY MOUTH ONCE DAILY    Benign hypertension       montelukast 10 MG tablet    SINGULAIR    30 tablet    Take 1 tablet (10 mg) by mouth At Bedtime    Post-nasal drip       simvastatin 20 MG tablet    ZOCOR    90 tablet    TAKE ONE TABLET BY MOUTH AT BEDTIME    Hyperlipidemia LDL goal <130

## 2017-10-30 NOTE — NURSING NOTE
"Chief Complaint   Patient presents with     Physical     not fasting, med check, no concerns       Initial /60 (BP Location: Left arm, Patient Position: Sitting, Cuff Size: Adult Regular)  Pulse 61  Temp 97.9  F (36.6  C) (Oral)  Ht 5' 2\" (1.575 m)  Wt 137 lb 9.6 oz (62.4 kg)  SpO2 100%  BMI 25.17 kg/m2 Estimated body mass index is 25.17 kg/(m^2) as calculated from the following:    Height as of this encounter: 5' 2\" (1.575 m).    Weight as of this encounter: 137 lb 9.6 oz (62.4 kg).  Medication Reconciliation: sathish Live CMA      "

## 2017-10-30 NOTE — NURSING NOTE
Prior to injection verified patient identity using patient's name and date of birth.  Screening Questionnaire for Adult Immunization    Are you sick today?   No   Do you have allergies to medications, food, a vaccine component or latex?   No   Have you ever had a serious reaction after receiving a vaccination?   No   Do you have a long-term health problem with heart disease, lung disease, asthma, kidney disease, metabolic disease (e.g. diabetes), anemia, or other blood disorder?   No   Do you have cancer, leukemia, HIV/AIDS, or any other immune system problem?   No   In the past 3 months, have you taken medications that affect  your immune system, such as prednisone, other steroids, or anticancer drugs; drugs for the treatment of rheumatoid arthritis, Crohn s disease, or psoriasis; or have you had radiation treatments?   No   Have you had a seizure, or a brain or other nervous system problem?   No   During the past year, have you received a transfusion of blood or blood     products, or been given immune (gamma) globulin or antiviral drug?   No   For women: Are you pregnant or is there a chance you could become        pregnant during the next month?   No   Have you received any vaccinations in the past 4 weeks?   No     Immunization questionnaire answers were all negative.        Per orders of Dr. Gross, injection of Flu vaccine given by [facility]. Patient instructed to remain in clinic for 15 minutes afterwards, and to report any adverse reaction to me immediately.       Screening performed by [facility] on 10/30/2017 at 11:43 AM.

## 2017-10-30 NOTE — PROGRESS NOTES
SUBJECTIVE:   Vicky Kang is a 82 year old female who presents for Preventive Visit.    Are you in the first 12 months of your Medicare coverage?  No    Physical   Annual:     Getting at least 3 servings of Calcium per day::  Yes    Bi-annual eye exam::  Yes    Dental care twice a year::  Yes    Sleep apnea or symptoms of sleep apnea::  None    Diet::  Regular (no restrictions)    Frequency of exercise::  2-3 days/week    Duration of exercise::  30-45 minutes    Taking medications regularly::  Yes    Medication side effects::  None    Additional concerns today::  YES (BP med)      COGNITIVE SCREEN  1) Repeat 3 items (Banana, Sunrise, Chair)    2) Clock draw:NORMAL  3) 3 item recall: Recalls 3 objects  Results: 3 items recalled: COGNITIVE IMPAIRMENT LESS LIKELY    Mini-CogTM Copyright S [employer]il[employer]. Licensed by the author for use in [facility] [employer]; reprinted with permission ([email or web address]). All rights reserved.          Reviewed and updated as needed this visit by clinical staffTobacco  Allergies  Meds  Med Hx  Surg Hx  Fam Hx  Soc Hx        Reviewed and updated as needed this visit by Provider  Allergies  Meds        Social History   Substance Use Topics     Smoking status: Never Smoker     Smokeless tobacco: Never Used     Alcohol use No       no alcohol drinks          Hypertension Follow-up      Outpatient blood pressures are being checked at home.  Results are 120/60,117/60, 133/64.    Low Salt Diet: no added salt          Today's PHQ-2 Score:   PHQ-2 ( 1999 Pfizer) 3/16/2017   Q1: Little interest or pleasure in doing things 0   Q2: Feeling down, depressed or hopeless 0   PHQ-2 Score 0       Do you feel safe in your environment - Yes    Do you have a Health Care Directive?: Yes: Patient states has Advance Directive and will bring in a copy to clinic.    Current providers sharing in care for this patient include:   Patient Care Team:  Claire Gross MD as PCP - General (Internal  "Medicine)      Hearing impairment: No    Ability to successfully perform activities of daily living: Yes, no assistance needed     Fall risk:         Home safety:  lack of grab bars in the bathroom      The following health maintenance items are reviewed in Epic and correct as of today:  Health Maintenance   Topic Date Due     INFLUENZA VACCINE (SYSTEM ASSIGNED)  09/01/2017     FALL RISK ASSESSMENT  09/30/2017     ADVANCE DIRECTIVE PLANNING Q5 YRS  04/24/2018     TETANUS IMMUNIZATION (SYSTEM ASSIGNED)  02/24/2023     DEXA SCAN SCREENING (SYSTEM ASSIGNED)  Completed     PNEUMOCOCCAL  Completed     Labs reviewed in EPIC      Review of Systems  Constitutional, HEENT, cardiovascular, pulmonary, GI, , musculoskeletal, neuro, skin, endocrine and psych systems are negative, except as otherwise noted.      OBJECTIVE:   /60 (BP Location: Left arm, Patient Position: Sitting, Cuff Size: Adult Regular)  Pulse 61  Temp 97.9  F (36.6  C) (Oral)  Ht 5' 2\" (1.575 m)  Wt 137 lb 9.6 oz (62.4 kg)  SpO2 100%  BMI 25.17 kg/m2 Estimated body mass index is 25.17 kg/(m^2) as calculated from the following:    Height as of this encounter: 5' 2\" (1.575 m).    Weight as of this encounter: 137 lb 9.6 oz (62.4 kg).  Physical Exam  GENERAL APPEARANCE: healthy, alert and no distress   EYES: Eyes grossly normal to inspection, PERRL and conjunctivae and sclerae normal  HENT: ear canals and TM's normal, nose and mouth without ulcers or lesions, oropharynx clear and oral mucous membranes moist  NECK: no adenopathy, no asymmetry, masses, or scars and thyroid normal to palpation  RESP: lungs clear to auscultation - no rales, rhonchi or wheezes  BREAST: normal without masses, tenderness or nipple discharge and no palpable axillary masses or adenopathy  CV: regular rate and rhythm, normal S1 S2, no S3 or S4, no murmur, click or rub, no peripheral edema and peripheral pulses strong  ABDOMEN: soft, nontender, no hepatosplenomegaly, no masses " "and bowel sounds normal  MS: no musculoskeletal defects are noted and gait is age appropriate without ataxia  SKIN: no suspicious lesions or rashes  NEURO: Normal strength and tone, sensory exam grossly normal, mentation intact and speech normal  PSYCH: mentation appears normal and affect normal/bright    ASSESSMENT / PLAN:     (Z00.00) Encounter for routine adult health examination without abnormal findings  (primary encounter diagnosis)  Comment: fasting  Plan: Comprehensive metabolic panel, Lipid panel         reflex to direct LDL Fasting, CBC with         platelets differential, TSH with free T4 reflex          (I10) Benign hypertension  Comment: at goal  Plan: losartan-hydrochlorothiazide (HYZAAR) 50-12.5         MG per tablet              End of Life Planning:  Patient currently has an advanced directive: Yes.  Practitioner is supportive of decision.    COUNSELING:  Reviewed preventive health counseling, as reflected in patient instructions        Estimated body mass index is 25.17 kg/(m^2) as calculated from the following:    Height as of this encounter: 5' 2\" (1.575 m).    Weight as of this encounter: 137 lb 9.6 oz (62.4 kg).     reports that she has never smoked. She has never used smokeless tobacco.        Appropriate preventive services were discussed with this patient, including applicable screening as appropriate for cardiovascular disease, diabetes, osteopenia/osteoporosis, and glaucoma.  As appropriate for age/gender, discussed screening for colorectal cancer, prostate cancer, breast cancer, and cervical cancer. Checklist reviewing preventive services available has been given to the patient.    Reviewed patients plan of care and provided an AVS. The Basic Care Plan (routine screening as documented in Health Maintenance) for Vicky meets the Care Plan requirement. This Care Plan has been established and reviewed with the Patient.    Counseling Resources:  ATP IV Guidelines  Pooled Cohorts Equation " Calculator  Breast Cancer Risk Calculator  FRAX Risk Assessment  ICSI Preventive Guidelines  Dietary Guidelines for Americans, 2010  [employer]'s MyPlate  ASA Prophylaxis  Lung CA Screening    Claire Gross MD  [facility]

## 2017-10-30 NOTE — PROGRESS NOTES

## 2017-11-01 DIAGNOSIS — Z00.00 ENCOUNTER FOR ROUTINE ADULT HEALTH EXAMINATION WITHOUT ABNORMAL FINDINGS: ICD-10-CM

## 2017-11-01 LAB
BASOPHILS # BLD AUTO: 0 10E9/L (ref 0–0.2)
BASOPHILS NFR BLD AUTO: 0.1 %
DIFFERENTIAL METHOD BLD: NORMAL
EOSINOPHIL # BLD AUTO: 0.2 10E9/L (ref 0–0.7)
EOSINOPHIL NFR BLD AUTO: 2.4 %
ERYTHROCYTE [DISTWIDTH] IN BLOOD BY AUTOMATED COUNT: 12.4 % (ref 10–15)
HCT VFR BLD AUTO: 42.1 % (ref 35–47)
HGB BLD-MCNC: 14 G/DL (ref 11.7–15.7)
LYMPHOCYTES # BLD AUTO: 2 10E9/L (ref 0.8–5.3)
LYMPHOCYTES NFR BLD AUTO: 25.6 %
MCH RBC QN AUTO: 31.7 PG (ref 26.5–33)
MCHC RBC AUTO-ENTMCNC: 33.3 G/DL (ref 31.5–36.5)
MCV RBC AUTO: 95 FL (ref 78–100)
MONOCYTES # BLD AUTO: 0.8 10E9/L (ref 0–1.3)
MONOCYTES NFR BLD AUTO: 10 %
NEUTROPHILS # BLD AUTO: 4.7 10E9/L (ref 1.6–8.3)
NEUTROPHILS NFR BLD AUTO: 61.9 %
PLATELET # BLD AUTO: 295 10E9/L (ref 150–450)
RBC # BLD AUTO: 4.42 10E12/L (ref 3.8–5.2)
WBC # BLD AUTO: 7.6 10E9/L (ref 4–11)

## 2017-11-01 PROCEDURE — 80050 GENERAL HEALTH PANEL: CPT | Performed by: INTERNAL MEDICINE

## 2017-11-01 PROCEDURE — 36415 COLL VENOUS BLD VENIPUNCTURE: CPT | Performed by: INTERNAL MEDICINE

## 2017-11-01 PROCEDURE — 80061 LIPID PANEL: CPT | Performed by: INTERNAL MEDICINE

## 2017-11-02 LAB
ALBUMIN SERPL-MCNC: 3.7 G/DL (ref 3.4–5)
ALP SERPL-CCNC: 77 U/L (ref 40–150)
ALT SERPL W P-5'-P-CCNC: 27 U/L (ref 0–50)
ANION GAP SERPL CALCULATED.3IONS-SCNC: 10 MMOL/L (ref 3–14)
AST SERPL W P-5'-P-CCNC: 20 U/L (ref 0–45)
BILIRUB SERPL-MCNC: 0.4 MG/DL (ref 0.2–1.3)
BUN SERPL-MCNC: 12 MG/DL (ref 7–30)
CALCIUM SERPL-MCNC: 9.1 MG/DL (ref 8.5–10.1)
CHLORIDE SERPL-SCNC: 102 MMOL/L (ref 94–109)
CHOLEST SERPL-MCNC: 137 MG/DL
CO2 SERPL-SCNC: 27 MMOL/L (ref 20–32)
CREAT SERPL-MCNC: 0.6 MG/DL (ref 0.52–1.04)
GFR SERPL CREATININE-BSD FRML MDRD: >90 ML/MIN/1.7M2
GLUCOSE SERPL-MCNC: 111 MG/DL (ref 70–99)
HDLC SERPL-MCNC: 49 MG/DL
LDLC SERPL CALC-MCNC: 52 MG/DL
NONHDLC SERPL-MCNC: 88 MG/DL
POTASSIUM SERPL-SCNC: 3.8 MMOL/L (ref 3.4–5.3)
PROT SERPL-MCNC: 7.1 G/DL (ref 6.8–8.8)
SODIUM SERPL-SCNC: 139 MMOL/L (ref 133–144)
TRIGL SERPL-MCNC: 180 MG/DL
TSH SERPL DL<=0.005 MIU/L-ACNC: 1.71 MU/L (ref 0.4–4)

## 2017-12-21 DIAGNOSIS — I10 BENIGN HYPERTENSION: ICD-10-CM

## 2017-12-22 RX ORDER — AMLODIPINE BESYLATE 5 MG/1
TABLET ORAL
Qty: 90 TABLET | Refills: 1 | Status: SHIPPED | OUTPATIENT
Start: 2017-12-22 | End: 2018-06-28

## 2018-01-12 DIAGNOSIS — E78.5 HYPERLIPIDEMIA LDL GOAL <130: ICD-10-CM

## 2018-01-16 RX ORDER — SIMVASTATIN 20 MG
TABLET ORAL
Qty: 90 TABLET | Refills: 2 | Status: SHIPPED | OUTPATIENT
Start: 2018-01-16 | End: 2018-10-29

## 2018-01-16 NOTE — TELEPHONE ENCOUNTER
"Requested Prescriptions   Pending Prescriptions Disp Refills     simvastatin (ZOCOR) 20 MG tablet [Pharmacy Med Name: SIMVASTATIN 20MG  TAB]  Last Written Prescription Date:  10/9/17  Last Fill Quantity: 90,  # refills: 0   Last Office Visit with [facility], [facility] or [facility] prescribing provider:  10/30/17   Future Office Visit:     90 tablet 0     Sig: TAKE ONE TABLET BY MOUTH AT BEDTIME    Statins Protocol Passed    1/12/2018  5:30 AM       Passed - LDL on file in past 12 months    Recent Labs   Lab Test  11/01/17   1025   LDL  52            Passed - No abnormal creatine kinase in past 12 months    No lab results found.         Passed - Recent or future visit with authorizing provider    Patient had office visit in the last year or has a visit in the next 30 days with authorizing provider.  See \"Patient Info\" tab in inbasket, or \"Choose Columns\" in Meds & Orders section of the refill encounter.              Passed - Patient is age 18 or older       Passed - No active pregnancy on record       Passed - No positive pregnancy test in past 12 months         Prescription approved per [facility] Refill Protocol.   "

## 2018-01-23 DIAGNOSIS — I10 BENIGN HYPERTENSION: ICD-10-CM

## 2018-01-30 RX ORDER — METOPROLOL SUCCINATE 50 MG/1
TABLET, EXTENDED RELEASE ORAL
Qty: 90 TABLET | Refills: 2 | Status: SHIPPED | OUTPATIENT
Start: 2018-01-30 | End: 2018-11-09

## 2018-01-30 NOTE — TELEPHONE ENCOUNTER
"Requested Prescriptions   Pending Prescriptions Disp Refills     metoprolol succinate (TOPROL-XL) 50 MG 24 hr tablet [Pharmacy Med Name: METOPROLOL ER 50MG  TAB] 90 tablet 0     Sig: TAKE ONE TABLET BY MOUTH ONCE DAILY    Beta-Blockers Protocol Passed    1/23/2018 12:48 PM       Passed - Blood pressure under 140/90    BP Readings from Last 3 Encounters:   10/30/17 130/60   04/10/17 112/72   03/16/17 120/60          Passed - Patient is age 6 or older       Passed - Recent or future visit with authorizing provider's specialty    Patient had office visit in the last year or has a visit in the next 30 days with authorizing provider.  See \"Patient Info\" tab in inbasket, or \"Choose Columns\" in Meds & Orders section of the refill encounter.   Last OV: 10/30/17        Prescription approved per [facility] Refill Protocol.  "

## 2018-04-23 ENCOUNTER — OFFICE VISIT (OUTPATIENT)
Dept: INTERNAL MEDICINE | Facility: CLINIC | Age: 83
End: 2018-04-23
Payer: COMMERCIAL

## 2018-04-23 VITALS
WEIGHT: 141 LBS | HEIGHT: 62 IN | HEART RATE: 82 BPM | BODY MASS INDEX: 25.95 KG/M2 | DIASTOLIC BLOOD PRESSURE: 62 MMHG | OXYGEN SATURATION: 94 % | SYSTOLIC BLOOD PRESSURE: 118 MMHG | TEMPERATURE: 98.4 F

## 2018-04-23 DIAGNOSIS — L20.9 ATOPIC DERMATITIS, UNSPECIFIED TYPE: Primary | ICD-10-CM

## 2018-04-23 PROCEDURE — 99213 OFFICE O/P EST LOW 20 MIN: CPT | Performed by: NURSE PRACTITIONER

## 2018-04-23 NOTE — NURSING NOTE
"Chief Complaint   Patient presents with     Eye Problem     Patient here for rash on eyelids for some weeks now       Initial /62 (BP Location: Right arm, Patient Position: Sitting, Cuff Size: Adult Regular)  Pulse 82  Temp 98.4  F (36.9  C) (Oral)  Ht 5' 2\" (1.575 m)  Wt 141 lb (64 kg)  SpO2 94%  Breastfeeding? No  BMI 25.79 kg/m2 Estimated body mass index is 25.79 kg/(m^2) as calculated from the following:    Height as of this encounter: 5' 2\" (1.575 m).    Weight as of this encounter: 141 lb (64 kg).  Medication Reconciliation: complete   Bear Krishnan MA    "

## 2018-04-23 NOTE — MR AVS SNAPSHOT
"              After Visit Summary   4/23/2018    Vicky Kang    MRN: [id number]           Patient Information     Date Of Birth          1935        Visit Information        Provider Department      4/23/2018 9:40 AM Camille De Leon NP [facility]        Today's Diagnoses     Atopic dermatitis, unspecified type    -  1       Follow-ups after your visit        Who to contact     If you have questions or need follow up information about today's clinic visit or your schedule please contact [facility] directly at [phone].  Normal or non-critical lab and imaging results will be communicated to you by [employer], letter or phone within 4 business days after the clinic has received the results. If you do not hear from us within 7 days, please contact the clinic through [employer] or phone. If you have a critical or abnormal lab result, we will notify you by phone as soon as possible.  Submit refill requests through [employer] or call your pharmacy and they will forward the refill request to us. Please allow 3 business days for your refill to be completed.          Additional Information About Your Visit        MyChart Information     [employer] gives you secure access to your electronic health record. If you see a primary care provider, you can also send messages to your care team and make appointments. If you have questions, please call your primary care clinic.  If you do not have a primary care provider, please call [phone] and they will assist you.        Care EveryWhere ID     This is your Care EveryWhere ID. This could be used by other organizations to access your [place] medical records  [id number]        Your Vitals Were     Pulse Temperature Height Pulse Oximetry Breastfeeding? BMI (Body Mass Index)    82 98.4  F (36.9  C) (Oral) 5' 2\" (1.575 m) 94% No 25.79 kg/m2       Blood Pressure from Last 3 Encounters:   04/23/18 118/62   10/30/17 130/60   04/10/17 112/72    " Weight from Last 3 Encounters:   04/23/18 141 lb (64 kg)   10/30/17 137 lb 9.6 oz (62.4 kg)   04/10/17 139 lb (63 kg)              Today, you had the following     No orders found for display       Primary Care Provider Office Phone # Fax #    Claire Gross [phone]       [address] [facility] [address]        Equal Access to Services     DAVID SANTOS : Hadii aad ku hadasho Soomaali, waaxda luqadaha, qaybta kaalmada adeegyada, waxay idiin hayaan adeeg kharash la'jeyson . So [facility] [phone].    ATENCIÓN: Si habla español, tiene a greer disposición servicios gratuitos de asistencia lingüística. Llame al [phone].    We comply with applicable federal civil rights laws and [place] laws. We do not discriminate on the basis of race, color, national origin, age, disability, sex, sexual orientation, or gender identity.            Thank you!     Thank you for choosing [facility]  for your care. Our goal is always to provide you with excellent care. Hearing back from our patients is one way we can continue to improve our services. Please take a few minutes to complete the written survey that you may receive in the mail after your visit with us. Thank you!             Your Updated Medication List - Protect others around you: Learn how to safely use, store and throw away your medicines at www.disposemymeds.org.          This list is accurate as of 4/23/18 10:50 AM.  Always use your most recent med list.                   Brand Name Dispense Instructions for use Diagnosis    amLODIPine 5 MG tablet    NORVASC    90 tablet    TAKE ONE TABLET BY MOUTH ONCE DAILY    Benign hypertension       CALCIUM 600 + D PO      Take  by mouth 2 times daily.        losartan-hydrochlorothiazide 50-12.5 MG per tablet    HYZAAR    90 tablet    Take 1 tablet by mouth daily    Benign hypertension       metoprolol succinate 50 MG 24 hr tablet    TOPROL-XL    90 tablet    TAKE ONE TABLET BY MOUTH  ONCE DAILY    Benign hypertension       montelukast 10 MG tablet    SINGULAIR    30 tablet    Take 1 tablet (10 mg) by mouth At Bedtime    Post-nasal drip       simvastatin 20 MG tablet    ZOCOR    90 tablet    TAKE ONE TABLET BY MOUTH AT BEDTIME    Hyperlipidemia LDL goal <130

## 2018-04-23 NOTE — PROGRESS NOTES
SUBJECTIVE:   Vicky Kang is a 83 year old female who presents to clinic today for the following health issues:      Eye lid rash      Duration: 2 weeks    Description (location/character/radiation): red, dry, flaky upper lid OS     Intensity:  mild    Accompanying signs and symptoms: none    History (similar episodes/previous evaluation): None    Precipitating or alleviating factors: None, no change in soaps, deterg, cosmetics     Therapies tried and outcome: hand lotion minimal improvement.         Patient Active Problem List   Diagnosis     Advanced directives, counseling/discussion     Benign hypertension     Hyperlipidemia LDL goal <130     Neuropathy     Preventative health care     Osteopenia     Abnormal finding on thyroid function test     Thyroid nodule     Subclinical hypothyroidism     Past Surgical History:   Procedure Laterality Date     CATARACT IOL, RT/LT       ENT SURGERY   Spring 2017    throat cyst removal     PHACOEMULSIFICATION CLEAR CORNEA WITH STANDARD INTRAOCULAR LENS IMPLANT  1/5/2012    Procedure:PHACOEMULSIFICATION CLEAR CORNEA WITH STANDARD INTRAOCULAR LENS IMPLANT; LEFT PHACOEMULSIFICATION CLEAR CORNEA WITH STANDARD LENS IMPLANT ; Surgeon:TUNDE GARDNER; Location: [facility]     VITRECTOMY ANTERIOR         Social History   Substance Use Topics     Smoking status: Never Smoker     Smokeless tobacco: Never Used     Alcohol use No     Family History   Problem Relation Age of Onset     CANCER Mother      Family History Negative Father          Current Outpatient Prescriptions   Medication Sig Dispense Refill     amLODIPine (NORVASC) 5 MG tablet TAKE ONE TABLET BY MOUTH ONCE DAILY 90 tablet 1     Calcium Carbonate-Vitamin D (CALCIUM 600 + D OR) Take  by mouth 2 times daily.       losartan-hydrochlorothiazide (HYZAAR) 50-12.5 MG per tablet Take 1 tablet by mouth daily 90 tablet 4     metoprolol succinate (TOPROL-XL) 50 MG 24 hr tablet TAKE ONE TABLET BY MOUTH ONCE DAILY 90 tablet 2      "simvastatin (ZOCOR) 20 MG tablet TAKE ONE TABLET BY MOUTH AT BEDTIME 90 tablet 2     montelukast (SINGULAIR) 10 MG tablet Take 1 tablet (10 mg) by mouth At Bedtime (Patient not taking: Reported on 4/23/2018) 30 tablet 5     BP Readings from Last 3 Encounters:   04/23/18 118/62   10/30/17 130/60   04/10/17 112/72    Wt Readings from Last 3 Encounters:   04/23/18 141 lb (64 kg)   10/30/17 137 lb 9.6 oz (62.4 kg)   04/10/17 139 lb (63 kg)                    Reviewed and updated as needed this visit by clinical staff  Tobacco  Allergies  Meds  Med Hx  Surg Hx  Fam Hx  Soc Hx      Reviewed and updated as needed this visit by Provider         ROS:  Constitutional, HEENT, cardiovascular, pulmonary, gi and gu systems are negative, except as otherwise noted.    OBJECTIVE:     /62 (BP Location: Right arm, Patient Position: Sitting, Cuff Size: Adult Regular)  Pulse 82  Temp 98.4  F (36.9  C) (Oral)  Ht 5' 2\" (1.575 m)  Wt 141 lb (64 kg)  SpO2 94%  Breastfeeding? No  BMI 25.79 kg/m2  Body mass index is 25.79 kg/(m^2).  GENERAL: healthy, alert and no distress  EYES: OS upper lid pink macular patch, no scale noted.  Conjunctiva, sclera normal.          ASSESSMENT/PLAN:               ICD-10-CM    1. Atopic dermatitis, unspecified type L20.9        OTC HC sparingly, not in eye, < 2 weeks  Consider derm consult.    Camille De Leon NP  [facility]    "

## 2018-05-15 ENCOUNTER — TRANSFERRED RECORDS (OUTPATIENT)
Dept: HEALTH INFORMATION MANAGEMENT | Facility: CLINIC | Age: 83
End: 2018-05-15

## 2018-06-28 DIAGNOSIS — I10 BENIGN HYPERTENSION: ICD-10-CM

## 2018-06-28 NOTE — TELEPHONE ENCOUNTER
"Requested Prescriptions   Pending Prescriptions Disp Refills     amLODIPine (NORVASC) 5 MG tablet [Pharmacy Med Name: AMLODIPINE 5MG TAB] 90 tablet 1    Last Written Prescription Date:  12/22/2017  Last Fill Quantity: 90,  # refills: 1   Last office visit: 4/23/2018 with prescribing provider:     Future Office Visit:   Sig: TAKE ONE TABLET BY MOUTH ONCE DAILY    Calcium Channel Blockers Protocol  Passed    6/28/2018  8:50 AM       Passed - Blood pressure under 140/90 in past 12 months    BP Readings from Last 3 Encounters:   04/23/18 118/62   10/30/17 130/60   04/10/17 112/72                Passed - Recent (12 mo) or future (30 days) visit within the authorizing provider's specialty    Patient had office visit in the last 12 months or has a visit in the next 30 days with authorizing provider or within the authorizing provider's specialty.  See \"Patient Info\" tab in inbasket, or \"Choose Columns\" in Meds & Orders section of the refill encounter.           Passed - Patient is age 18 or older       Passed - No active pregnancy on record       Passed - Normal serum creatinine on file in past 12 months    Recent Labs   Lab Test  11/01/17   1025   CR  0.60            Passed - No positive pregnancy test in past 12 months        "

## 2018-06-30 RX ORDER — AMLODIPINE BESYLATE 5 MG/1
TABLET ORAL
Qty: 90 TABLET | Refills: 0 | Status: SHIPPED | OUTPATIENT
Start: 2018-06-30 | End: 2018-10-06

## 2018-09-20 ENCOUNTER — OFFICE VISIT (OUTPATIENT)
Dept: INTERNAL MEDICINE | Facility: CLINIC | Age: 83
End: 2018-09-20
Payer: COMMERCIAL

## 2018-09-20 VITALS
BODY MASS INDEX: 24.77 KG/M2 | HEIGHT: 62 IN | HEART RATE: 86 BPM | DIASTOLIC BLOOD PRESSURE: 74 MMHG | RESPIRATION RATE: 16 BRPM | TEMPERATURE: 98.2 F | SYSTOLIC BLOOD PRESSURE: 136 MMHG | WEIGHT: 134.6 LBS | OXYGEN SATURATION: 98 %

## 2018-09-20 DIAGNOSIS — R19.7 DIARRHEA, UNSPECIFIED TYPE: Primary | ICD-10-CM

## 2018-09-20 PROCEDURE — 99213 OFFICE O/P EST LOW 20 MIN: CPT | Performed by: FAMILY MEDICINE

## 2018-09-20 ASSESSMENT — PAIN SCALES - GENERAL: PAINLEVEL: NO PAIN (0)

## 2018-09-20 NOTE — PROGRESS NOTES
SUBJECTIVE:   Vicky Kang is a 83 year old female who presents to clinic today for the following health issues:    Diarrhea about 3 days since 9-8-20108.      HISTORY      She had onset of diarrhea on 9-8. Every 3-4 days has had another spell.  Only one diarrhea spell per incident, not multiple per day.  Some abd cramping accompanies. She had eaten out 2 days before the first spell.Last spell 2 days ago.  She ate Walli and wild rice at a restaurant.    One dose of Imodium was taken.    She is not having fever or vomiting.  No bloody diarrhea.  No weakness.      Patient Active Problem List   Diagnosis     Advanced directives, counseling/discussion     Benign hypertension     Hyperlipidemia LDL goal <130     Neuropathy     Preventative health care     Osteopenia     Abnormal finding on thyroid function test     Thyroid nodule     Subclinical hypothyroidism     Current Outpatient Prescriptions   Medication Sig Dispense Refill     amLODIPine (NORVASC) 5 MG tablet TAKE ONE TABLET BY MOUTH ONCE DAILY 90 tablet 0     Calcium Carbonate-Vitamin D (CALCIUM 600 + D OR) Take  by mouth 2 times daily.       losartan-hydrochlorothiazide (HYZAAR) 50-12.5 MG per tablet Take 1 tablet by mouth daily 90 tablet 4     metoprolol succinate (TOPROL-XL) 50 MG 24 hr tablet TAKE ONE TABLET BY MOUTH ONCE DAILY 90 tablet 2     montelukast (SINGULAIR) 10 MG tablet Take 1 tablet (10 mg) by mouth At Bedtime (Patient not taking: Reported on 4/23/2018) 30 tablet 5     simvastatin (ZOCOR) 20 MG tablet TAKE ONE TABLET BY MOUTH AT BEDTIME 90 tablet 2       REVIEW OF SYSTEMS    No fever.  No congestion or S OB.  No chest pain.  No edema.  No urinary difficulty.      Past Medical History:   Diagnosis Date     Abnormal finding on thyroid function test 11/10/2015     Hyperlipidemia      Hypertension      Neuropathy      Subclinical hypothyroidism 11/10/2015     Thyroid nodule 11/10/2015       EXAM  /74 (BP Location: Left arm, Patient Position:  "Chair, Cuff Size: Adult Large)  Pulse 86  Temp 98.2  F (36.8  C) (Oral)  Resp 16  Ht 5' 2\" (1.575 m)  Wt 134 lb 9.6 oz (61.1 kg)  SpO2 98%  Breastfeeding? No  BMI 24.62 kg/m2    Exam shows a vigorous 83-year-old woman.  NAD.  HEENT shows no scleral icterus.  No inflammation of pharynx.  Neck is without adenopathy or mass.  Chest shows nonlabored breathing.  Abdomen is nondistended, no focal tenderness or guarding.  Skin unremarkable.  No edema.      (R19.7) Diarrhea, unspecified type  (primary encounter diagnosis)  Comment:   Episodic diarrhea of somewhat unknown cause.  Possibly foodborne versus viral.  Limited amount of symptoms and discomfort at this time.  Many times these circumstances self correct.  Plan:   Patient was advised.  Light foods and observation were advised.  Probiotics could be tried.  May be best to hold off on simvastatin for a week.  Follow-up if she has persistent or worsening symptoms but okay to manage expectantly for a week or so.        "

## 2018-09-20 NOTE — MR AVS SNAPSHOT
"              After Visit Summary   9/20/2018    Vicky Kang    MRN: [id number]           Patient Information     Date Of Birth          1935        Visit Information        Provider Department      9/20/2018 8:20 AM Boyd Mcdonnell MD [facility]        Today's Diagnoses     Diarrhea, unspecified type    -  1       Follow-ups after your visit        Who to contact     If you have questions or need follow up information about today's clinic visit or your schedule please contact [facility] directly at [phone].  Normal or non-critical lab and imaging results will be communicated to you by Prizeohart, letter or phone within 4 business days after the clinic has received the results. If you do not hear from us within 7 days, please contact the clinic through SafetySkillst or phone. If you have a critical or abnormal lab result, we will notify you by phone as soon as possible.  Submit refill requests through Botano[employer] or call your pharmacy and they will forward the refill request to us. Please allow 3 business days for your refill to be completed.          Additional Information About Your Visit        MyChart Information     [employer] gives you secure access to your electronic health record. If you see a primary care provider, you can also send messages to your care team and make appointments. If you have questions, please call your primary care clinic.  If you do not have a primary care provider, please call [phone] and they will assist you.        Care EveryWhere ID     This is your Care EveryWhere ID. This could be used by other organizations to access your [facility] medical records  [id number]        Your Vitals Were     Pulse Temperature Respirations Height Pulse Oximetry Breastfeeding?    86 98.2  F (36.8  C) (Oral) 16 5' 2\" (1.575 m) 98% No    BMI (Body Mass Index)                   24.62 kg/m2            Blood Pressure from Last 3 Encounters:   09/20/18 136/74   04/23/18 " 118/62   10/30/17 130/60    Weight from Last 3 Encounters:   09/20/18 134 lb 9.6 oz (61.1 kg)   04/23/18 141 lb (64 kg)   10/30/17 137 lb 9.6 oz (62.4 kg)              Today, you had the following     No orders found for display       Primary Care Provider Office Phone # Fax #    Claire Gross [phone]       [address]  [facility] [address]        Equal Access to Services     DAVID SANTOS : Hadii aad ku hadasho Soomaali, waaxda luqadaha, qaybta kaalmada adeegyada, waxay idiin hayaan adeeg kharadiane appiah . So [facility] [phone].    ATENCIÓN: Si habla español, tiene a greer disposición servicios gratuitos de asistencia lingüística. Llame al [phone].    We comply with applicable federal civil rights laws and [place] laws. We do not discriminate on the basis of race, color, national origin, age, disability, sex, sexual orientation, or gender identity.            Thank you!     Thank you for choosing [facility]  for your care. Our goal is always to provide you with excellent care. Hearing back from our patients is one way we can continue to improve our services. Please take a few minutes to complete the written survey that you may receive in the mail after your visit with us. Thank you!             Your Updated Medication List - Protect others around you: Learn how to safely use, store and throw away your medicines at www.disposemymeds.org.          This list is accurate as of 9/20/18 11:34 AM.  Always use your most recent med list.                   Brand Name Dispense Instructions for use Diagnosis    amLODIPine 5 MG tablet    NORVASC    90 tablet    TAKE ONE TABLET BY MOUTH ONCE DAILY    Benign hypertension       CALCIUM 600 + D PO      Take  by mouth 2 times daily.        losartan-hydrochlorothiazide 50-12.5 MG per tablet    HYZAAR    90 tablet    Take 1 tablet by mouth daily    Benign hypertension       metoprolol succinate 50 MG 24 hr tablet    TOPROL-XL    90  tablet    TAKE ONE TABLET BY MOUTH ONCE DAILY    Benign hypertension       montelukast 10 MG tablet    SINGULAIR    30 tablet    Take 1 tablet (10 mg) by mouth At Bedtime    Post-nasal drip       simvastatin 20 MG tablet    ZOCOR    90 tablet    TAKE ONE TABLET BY MOUTH AT BEDTIME    Hyperlipidemia LDL goal <130

## 2018-09-26 ENCOUNTER — OFFICE VISIT (OUTPATIENT)
Dept: INTERNAL MEDICINE | Facility: CLINIC | Age: 83
End: 2018-09-26
Payer: COMMERCIAL

## 2018-09-26 VITALS
SYSTOLIC BLOOD PRESSURE: 150 MMHG | BODY MASS INDEX: 24.59 KG/M2 | OXYGEN SATURATION: 99 % | HEIGHT: 62 IN | DIASTOLIC BLOOD PRESSURE: 70 MMHG | RESPIRATION RATE: 14 BRPM | HEART RATE: 90 BPM | WEIGHT: 133.6 LBS | TEMPERATURE: 98.1 F

## 2018-09-26 DIAGNOSIS — R19.7 DIARRHEA, UNSPECIFIED TYPE: Primary | ICD-10-CM

## 2018-09-26 PROCEDURE — 99213 OFFICE O/P EST LOW 20 MIN: CPT | Performed by: FAMILY MEDICINE

## 2018-09-26 RX ORDER — CIPROFLOXACIN 250 MG/1
250 TABLET, FILM COATED ORAL 2 TIMES DAILY
Qty: 14 TABLET | Refills: 0 | Status: SHIPPED | OUTPATIENT
Start: 2018-09-26 | End: 2018-10-03

## 2018-09-26 NOTE — NURSING NOTE
"/70 (BP Location: Left arm, Patient Position: Chair, Cuff Size: Adult Regular)  Pulse 90  Temp 98.1  F (36.7  C) (Oral)  Resp 14  Ht 5' 2\" (1.575 m)  Wt 133 lb 9.6 oz (60.6 kg)  LMP  (LMP Unknown)  SpO2 99%  Breastfeeding? No  BMI 24.44 kg/m2  Anushka Parnell CMA    "

## 2018-09-26 NOTE — PROGRESS NOTES
"CHIEF COMPLAINT    Follow-up diarrhea.        HISTORY    Vicky continues to have episodic diarrhea.  Most recent spells were 3 days ago and one day ago.  This has been going on since September 8.  She is eating a very bland diet.  Initially she used a little Imodium.  She did not have a febrile illness nor did she have vomiting at the onset.  She continues afebrile.  She has a little bit of crampy pain, not localized.    She still wonders about the possibility of foodborne illness.    No recent antibiotics.  No recent travel out of this area.    Patient Active Problem List   Diagnosis     Advanced directives, counseling/discussion     Benign hypertension     Hyperlipidemia LDL goal <130     Neuropathy     Preventative health care     Osteopenia     Abnormal finding on thyroid function test     Thyroid nodule     Subclinical hypothyroidism       REVIEW OF SYSTEMS    Slight weight loss.  No chest congestion or S OB.  No chest pain.  No rashes.  No joint pain or extremity swelling.      Past Medical History:   Diagnosis Date     Abnormal finding on thyroid function test 11/10/2015     Hyperlipidemia      Hypertension      Neuropathy      Subclinical hypothyroidism 11/10/2015     Thyroid nodule 11/10/2015       EXAM  /70 (BP Location: Left arm, Patient Position: Chair, Cuff Size: Adult Regular)  Pulse 90  Temp 98.1  F (36.7  C) (Oral)  Resp 14  Ht 5' 2\" (1.575 m)  Wt 133 lb 9.6 oz (60.6 kg)  LMP  (LMP Unknown)  SpO2 99%  Breastfeeding? No  BMI 24.44 kg/m2    HEENT.  No scleral icterus.  Neck negative.  Nonlabored breathing.  Abdomen nondistended.  There is no localized tenderness or guarding.      (R19.7) Diarrhea, unspecified type  (primary encounter diagnosis)  Comment:   Plan: Clostridium difficile Toxin B PCR, Enteric         Bacteria and Virus Panel by JEFF Stool, Ova and         Parasite Exam Routine, ciprofloxacin (CIPRO)         250 MG tablet          She will continue on her bland " diet.  Diarrhea workup ordered.  Medication begun.

## 2018-09-26 NOTE — MR AVS SNAPSHOT
After Visit Summary   9/26/2018    Vicky Kang    MRN: [id number]           Patient Information     Date Of Birth          1935        Visit Information        Provider Department      9/26/2018 9:00 AM Boyd Mcdonnell MD [facility]        Today's Diagnoses     Diarrhea, unspecified type    -  1      Care Instructions      Obtain stool tests.    Then take medication as directed.          Follow-ups after your visit        Future tests that were ordered for you today     Open Future Orders        Priority Expected Expires Ordered    Clostridium difficile Toxin B PCR Routine  10/26/2018 9/26/2018    Enteric Bacteria and Virus Panel by JEFF Stool Routine  9/26/2019 9/26/2018    Ova and Parasite Exam Routine Routine  9/26/2019 9/26/2018            Who to contact     If you have questions or need follow up information about today's clinic visit or your schedule please contact [facility] directly at [phone].  Normal or non-critical lab and imaging results will be communicated to you by MyChart, letter or phone within 4 business days after the clinic has received the results. If you do not hear from us within 7 days, please contact the clinic through Storefronthart or phone. If you have a critical or abnormal lab result, we will notify you by phone as soon as possible.  Submit refill requests through [employer] or call your pharmacy and they will forward the refill request to us. Please allow 3 business days for your refill to be completed.          Additional Information About Your Visit        MyChart Information     [employer] gives you secure access to your electronic health record. If you see a primary care provider, you can also send messages to your care team and make appointments. If you have questions, please call your primary care clinic.  If you do not have a primary care provider, please call [phone] and they will assist you.        Care EveryWhere ID      "This is your Care EveryWhere ID. This could be used by other organizations to access your [facility] medical records  [id number]        Your Vitals Were     Pulse Temperature Respirations Height Last Period Pulse Oximetry    90 98.1  F (36.7  C) (Oral) 14 5' 2\" (1.575 m) (LMP Unknown) 99%    Breastfeeding? BMI (Body Mass Index)                No 24.44 kg/m2           Blood Pressure from Last 3 Encounters:   09/26/18 150/70   09/20/18 136/74   04/23/18 118/62    Weight from Last 3 Encounters:   09/26/18 133 lb 9.6 oz (60.6 kg)   09/20/18 134 lb 9.6 oz (61.1 kg)   04/23/18 141 lb (64 kg)                 Today's Medication Changes          These changes are accurate as of 9/26/18  9:23 AM.  If you have any questions, ask your nurse or doctor.               Start taking these medicines.        Dose/Directions    ciprofloxacin 250 MG tablet   Commonly known as:  CIPRO   Used for:  Diarrhea, unspecified type   Started by:  Boyd Mcdonnell MD        Dose:  250 mg   Take 1 tablet (250 mg) by mouth 2 times daily for 7 days   Quantity:  14 tablet   Refills:  0            Where to get your medicines      These medications were sent to [facility] [address] [place], [place] [address]     Phone:  [phone]     ciprofloxacin 250 MG tablet                Primary Care Provider Office Phone # Fax #    Claire Hira Gross [phone]       [address] [place] [address]        Equal Access to Services     [facility]: Hadii dl garlando Sojadiel, waaxda luqadaha, qaybta kaalmada fiorella yang . So [facility] [phone].    ATENCIÓN: Si habla español, tiene a greer disposición servicios gratuitos de asistencia lingüística. Llame al [phone].    We comply with applicable federal civil rights laws and [place] laws. We do not discriminate on the basis of race, color, national origin, age, disability, sex, " sexual orientation, or gender identity.            Thank you!     Thank you for choosing [facility]  for your care. Our goal is always to provide you with excellent care. Hearing back from our patients is one way we can continue to improve our services. Please take a few minutes to complete the written survey that you may receive in the mail after your visit with us. Thank you!             Your Updated Medication List - Protect others around you: Learn how to safely use, store and throw away your medicines at www.disposemymeds.org.          This list is accurate as of 9/26/18  9:23 AM.  Always use your most recent med list.                   Brand Name Dispense Instructions for use Diagnosis    amLODIPine 5 MG tablet    NORVASC    90 tablet    TAKE ONE TABLET BY MOUTH ONCE DAILY    Benign hypertension       CALCIUM 600 + D PO      Take  by mouth 2 times daily.        ciprofloxacin 250 MG tablet    CIPRO    14 tablet    Take 1 tablet (250 mg) by mouth 2 times daily for 7 days    Diarrhea, unspecified type       losartan-hydrochlorothiazide 50-12.5 MG per tablet    HYZAAR    90 tablet    Take 1 tablet by mouth daily    Benign hypertension       metoprolol succinate 50 MG 24 hr tablet    TOPROL-XL    90 tablet    TAKE ONE TABLET BY MOUTH ONCE DAILY    Benign hypertension       simvastatin 20 MG tablet    ZOCOR    90 tablet    TAKE ONE TABLET BY MOUTH AT BEDTIME    Hyperlipidemia LDL goal <130

## 2018-09-28 DIAGNOSIS — R19.7 DIARRHEA, UNSPECIFIED TYPE: ICD-10-CM

## 2018-09-28 LAB
C DIFF TOX B STL QL: NEGATIVE
SPECIMEN SOURCE: NORMAL

## 2018-09-28 PROCEDURE — 87506 IADNA-DNA/RNA PROBE TQ 6-11: CPT | Performed by: FAMILY MEDICINE

## 2018-09-28 PROCEDURE — 87177 OVA AND PARASITES SMEARS: CPT | Performed by: FAMILY MEDICINE

## 2018-09-28 PROCEDURE — 87209 SMEAR COMPLEX STAIN: CPT | Performed by: FAMILY MEDICINE

## 2018-09-28 PROCEDURE — 87493 C DIFF AMPLIFIED PROBE: CPT | Performed by: FAMILY MEDICINE

## 2018-10-01 LAB
O+P STL MICRO: NORMAL
O+P STL MICRO: NORMAL
SPECIMEN SOURCE: NORMAL

## 2018-10-06 DIAGNOSIS — I10 BENIGN HYPERTENSION: ICD-10-CM

## 2018-10-08 NOTE — TELEPHONE ENCOUNTER
"Requested Prescriptions   Pending Prescriptions Disp Refills     amLODIPine (NORVASC) 5 MG tablet [Pharmacy Med Name: AMLODIPINE 5MG TAB] 90 tablet 0    Last Written Prescription Date:  06/30/2018  Last Fill Quantity: 90,  # refills: 0   Last office visit: 9/26/2018 with prescribing provider:     Future Office Visit:   Sig: TAKE 1 TABLET BY MOUTH ONCE DAILY    Calcium Channel Blockers Protocol  Failed    10/6/2018 10:57 AM       Failed - Blood pressure under 140/90 in past 12 months    BP Readings from Last 3 Encounters:   09/26/18 150/70   09/20/18 136/74   04/23/18 118/62                Passed - Recent (12 mo) or future (30 days) visit within the authorizing provider's specialty    Patient had office visit in the last 12 months or has a visit in the next 30 days with authorizing provider or within the authorizing provider's specialty.  See \"Patient Info\" tab in inbasket, or \"Choose Columns\" in Meds & Orders section of the refill encounter.           Passed - Patient is age 18 or older       Passed - No active pregnancy on record       Passed - Normal serum creatinine on file in past 12 months    Recent Labs   Lab Test  11/01/17   1025   CR  0.60            Passed - No positive pregnancy test in past 12 months        "

## 2018-10-09 RX ORDER — AMLODIPINE BESYLATE 5 MG/1
TABLET ORAL
Qty: 90 TABLET | Refills: 0 | Status: SHIPPED | OUTPATIENT
Start: 2018-10-09 | End: 2018-11-28

## 2018-10-09 NOTE — TELEPHONE ENCOUNTER
Routing refill request to provider for review/approval because:  Failed protocol, last blood pressure high

## 2018-10-29 DIAGNOSIS — E78.5 HYPERLIPIDEMIA LDL GOAL <130: ICD-10-CM

## 2018-10-29 NOTE — TELEPHONE ENCOUNTER
"Requested Prescriptions   Pending Prescriptions Disp Refills     Simvastatin (ZOCOR) 20 MG tablet [Pharmacy Med Name: SIMVASTATIN 20MG  TAB]  Last Written Prescription Date:  1/16/2018  Last Fill Quantity: 90,  # refills: 2   Last office visit: 9/26/2018 with prescribing provider:     Future Office Visit:   90 tablet 2     Sig: TAKE ONE TABLET BY MOUTH ONCE DAILY AT BEDTIME    Statins Protocol Passed    10/29/2018  5:30 AM       Passed - LDL on file in past 12 months    Recent Labs   Lab Test  11/01/17   1025   LDL  52            Passed - No abnormal creatine kinase in past 12 months    No lab results found.            Passed - Recent (12 mo) or future (30 days) visit within the authorizing provider's specialty    Patient had office visit in the last 12 months or has a visit in the next 30 days with authorizing provider or within the authorizing provider's specialty.  See \"Patient Info\" tab in inbasket, or \"Choose Columns\" in Meds & Orders section of the refill encounter.             Passed - Patient is age 18 or older       Passed - No active pregnancy on record       Passed - No positive pregnancy test in past 12 months        "

## 2018-10-31 RX ORDER — SIMVASTATIN 20 MG
TABLET ORAL
Qty: 30 TABLET | Refills: 0 | Status: SHIPPED | OUTPATIENT
Start: 2018-10-31 | End: 2018-11-28

## 2018-10-31 NOTE — TELEPHONE ENCOUNTER
Medication is being filled for 1 time refill only due to:  Patient needs to be seen because it has been more than one year since last visit.   Due for physical and fasting labs.  Last 10/30/17.  Lor Brumfield RN

## 2018-11-09 DIAGNOSIS — I10 BENIGN HYPERTENSION: ICD-10-CM

## 2018-11-09 NOTE — TELEPHONE ENCOUNTER
"Requested Prescriptions   Pending Prescriptions Disp Refills     metoprolol succinate (TOPROL-XL) 50 MG 24 hr tablet [Pharmacy Med Name: METOPROLOL ER 50MG  TAB] 90 tablet 2    Last Written Prescription Date:  01/30/2018  Last Fill Quantity: 90,  # refills: 2   Last office visit: 9/26/2018 with prescribing provider:     Future Office Visit:   Next 5 appointments (look out 90 days)     Nov 28, 2018  9:40 AM CST   [facility] Claire Gross MD   [facility]    [address]  [facility] [address]   [phone]                Sig: TAKE 1 TABLET BY MOUTH ONCE DAILY    Beta-Blockers Protocol Failed    11/9/2018 10:30 AM       Failed - Blood pressure under 140/90 in past 12 months    BP Readings from Last 3 Encounters:   09/26/18 150/70   09/20/18 136/74   04/23/18 118/62                Passed - Patient is age 6 or older       Passed - Recent (12 mo) or future (30 days) visit within the authorizing provider's specialty    Patient had office visit in the last 12 months or has a visit in the next 30 days with authorizing provider or within the authorizing provider's specialty.  See \"Patient Info\" tab in inbasket, or \"Choose Columns\" in Meds & Orders section of the refill encounter.              "

## 2018-11-13 RX ORDER — METOPROLOL SUCCINATE 50 MG/1
TABLET, EXTENDED RELEASE ORAL
Qty: 90 TABLET | Refills: 2 | Status: SHIPPED | OUTPATIENT
Start: 2018-11-13 | End: 2019-09-16

## 2018-11-24 DIAGNOSIS — I10 BENIGN HYPERTENSION: ICD-10-CM

## 2018-11-26 NOTE — TELEPHONE ENCOUNTER
"Requested Prescriptions   Pending Prescriptions Disp Refills     losartan-hydrochlorothiazide (HYZAAR) 50-12.5 MG per tablet [Pharmacy Med Name: LOSARTAN/HCT 50-12.5MG TAB] 90 tablet 4    Last Written Prescription Date:  10/30/2017  Last Fill Quantity: 90,  # refills: 4   Last office visit: 9/26/2018 with prescribing provider:     Future Office Visit:   Next 5 appointments (look out 90 days)     Nov 28, 2018  9:40 AM CST   PHYSICAL with Claire Gross MD   [facility] ([facility])    [address]  [facility] [id number] [phone]                Sig: TAKE ONE TABLET BY MOUTH ONCE DAILY    Angiotensin-II Receptors Failed    11/24/2018  8:32 AM       Failed - Blood pressure under 140/90 in past 12 months    BP Readings from Last 3 Encounters:   09/26/18 150/70   09/20/18 136/74   04/23/18 118/62                Failed - Normal serum creatinine on file in past 12 months    Recent Labs   Lab Test  11/01/17   1025   CR  0.60            Failed - Normal serum potassium on file in past 12 months    Recent Labs   Lab Test  11/01/17   1025   POTASSIUM  3.8                   Passed - Recent (12 mo) or future (30 days) visit within the authorizing provider's specialty    Patient had office visit in the last 12 months or has a visit in the next 30 days with authorizing provider or within the authorizing provider's specialty.  See \"Patient Info\" tab in inbasket, or \"Choose Columns\" in Meds & Orders section of the refill encounter.             Passed - Patient is age 18 or older       Passed - No active pregnancy on record       Passed - No positive pregnancy test in past 12 months        "

## 2018-11-28 ENCOUNTER — OFFICE VISIT (OUTPATIENT)
Dept: INTERNAL MEDICINE | Facility: CLINIC | Age: 83
End: 2018-11-28
Payer: COMMERCIAL

## 2018-11-28 VITALS
HEIGHT: 62 IN | WEIGHT: 135.9 LBS | OXYGEN SATURATION: 96 % | DIASTOLIC BLOOD PRESSURE: 82 MMHG | TEMPERATURE: 98.3 F | SYSTOLIC BLOOD PRESSURE: 128 MMHG | BODY MASS INDEX: 25.01 KG/M2 | HEART RATE: 96 BPM | RESPIRATION RATE: 13 BRPM

## 2018-11-28 DIAGNOSIS — Z00.00 ENCOUNTER FOR ROUTINE ADULT HEALTH EXAMINATION WITHOUT ABNORMAL FINDINGS: Primary | ICD-10-CM

## 2018-11-28 DIAGNOSIS — E78.5 HYPERLIPIDEMIA LDL GOAL <130: ICD-10-CM

## 2018-11-28 DIAGNOSIS — I10 BENIGN HYPERTENSION: ICD-10-CM

## 2018-11-28 DIAGNOSIS — E03.8 SUBCLINICAL HYPOTHYROIDISM: ICD-10-CM

## 2018-11-28 LAB
BASOPHILS # BLD AUTO: 0 10E9/L (ref 0–0.2)
BASOPHILS NFR BLD AUTO: 0.3 %
DIFFERENTIAL METHOD BLD: NORMAL
EOSINOPHIL # BLD AUTO: 0.2 10E9/L (ref 0–0.7)
EOSINOPHIL NFR BLD AUTO: 2.1 %
ERYTHROCYTE [DISTWIDTH] IN BLOOD BY AUTOMATED COUNT: 12.5 % (ref 10–15)
HCT VFR BLD AUTO: 43.7 % (ref 35–47)
HGB BLD-MCNC: 14.2 G/DL (ref 11.7–15.7)
LYMPHOCYTES # BLD AUTO: 1.9 10E9/L (ref 0.8–5.3)
LYMPHOCYTES NFR BLD AUTO: 24.3 %
MCH RBC QN AUTO: 31.8 PG (ref 26.5–33)
MCHC RBC AUTO-ENTMCNC: 32.5 G/DL (ref 31.5–36.5)
MCV RBC AUTO: 98 FL (ref 78–100)
MONOCYTES # BLD AUTO: 0.6 10E9/L (ref 0–1.3)
MONOCYTES NFR BLD AUTO: 8.2 %
NEUTROPHILS # BLD AUTO: 5 10E9/L (ref 1.6–8.3)
NEUTROPHILS NFR BLD AUTO: 65.1 %
PLATELET # BLD AUTO: 324 10E9/L (ref 150–450)
RBC # BLD AUTO: 4.47 10E12/L (ref 3.8–5.2)
WBC # BLD AUTO: 7.6 10E9/L (ref 4–11)

## 2018-11-28 PROCEDURE — 85025 COMPLETE CBC W/AUTO DIFF WBC: CPT | Performed by: INTERNAL MEDICINE

## 2018-11-28 PROCEDURE — 84443 ASSAY THYROID STIM HORMONE: CPT | Performed by: INTERNAL MEDICINE

## 2018-11-28 PROCEDURE — 36415 COLL VENOUS BLD VENIPUNCTURE: CPT | Performed by: INTERNAL MEDICINE

## 2018-11-28 PROCEDURE — G0439 PPPS, SUBSEQ VISIT: HCPCS | Performed by: INTERNAL MEDICINE

## 2018-11-28 PROCEDURE — 80061 LIPID PANEL: CPT | Performed by: INTERNAL MEDICINE

## 2018-11-28 PROCEDURE — 80053 COMPREHEN METABOLIC PANEL: CPT | Performed by: INTERNAL MEDICINE

## 2018-11-28 RX ORDER — LOSARTAN POTASSIUM AND HYDROCHLOROTHIAZIDE 12.5; 5 MG/1; MG/1
TABLET ORAL
Start: 2018-11-28

## 2018-11-28 RX ORDER — AMLODIPINE BESYLATE 5 MG/1
5 TABLET ORAL DAILY
Qty: 90 TABLET | Refills: 4 | Status: SHIPPED | OUTPATIENT
Start: 2018-11-28 | End: 2020-02-14

## 2018-11-28 RX ORDER — SIMVASTATIN 20 MG
TABLET ORAL
Qty: 90 TABLET | Refills: 4 | Status: SHIPPED | OUTPATIENT
Start: 2018-11-28 | End: 2020-03-02

## 2018-11-28 ASSESSMENT — ENCOUNTER SYMPTOMS
COUGH: 0
BREAST MASS: 0
FREQUENCY: 0
PARESTHESIAS: 0
HEADACHES: 0
HEMATURIA: 0
CONSTIPATION: 0
SORE THROAT: 0
HEMATOCHEZIA: 0
DIARRHEA: 0
SHORTNESS OF BREATH: 0
WEAKNESS: 0
PALPITATIONS: 0
CHILLS: 0
NAUSEA: 0
ARTHRALGIAS: 1
DYSURIA: 0
HEARTBURN: 0
EYE PAIN: 0
DIZZINESS: 0
MYALGIAS: 0
JOINT SWELLING: 1
ABDOMINAL PAIN: 0

## 2018-11-28 ASSESSMENT — ACTIVITIES OF DAILY LIVING (ADL): CURRENT_FUNCTION: NO ASSISTANCE NEEDED

## 2018-11-28 NOTE — NURSING NOTE
"/82  Pulse 96  Temp 98.3  F (36.8  C) (Oral)  Resp 13  Ht 5' 2\" (1.575 m)  Wt 135 lb 14.4 oz (61.6 kg)  LMP  (LMP Unknown)  SpO2 96%  BMI 24.86 kg/m2  Patient in for annual Medicare Visit.  Kavitha Armstrong CMA    "

## 2018-11-28 NOTE — PROGRESS NOTES
"SUBJECTIVE:   Vicky Kang is a 83 year old female who presents for Preventive Visit.      Are you in the first 12 months of your Medicare coverage?  No    Annual Wellness Visit     In general, how would you rate your overall health?  Excellent    Frequency of exercise:  2-3 days/week    Duration of exercise:  15-30 minutes    Do you usually eat at least 4 servings of fruit and vegetables a day, include whole grains    & fiber and avoid regularly eating high fat or \"junk\" foods?  Yes    Taking medications regularly:  Yes    Medication side effects:  None    Ability to successfully perform activities of daily living:  No assistance needed    Home Safety:  No safety concerns identified    Hearing Impairment:  Need to ask people to speak up or repeat themselves and difficulty understanding soft or whispered speech    In the past 6 months, have you been bothered by leaking of urine?  No    In general, how would you rate your overall mental or emotional health?  Excellent    PHQ-2 Total Score: 0    Additional concerns today:  Yes    Do you feel safe in your environment? Yes    Do you have a Health Care Directive? Yes: Advance Directive has been received and scanned.      Fall risk  Fallen 2 or more times in the past year?: No  Any fall with injury in the past year?: No    Cognitive Screening   1) Repeat 3 items (Leader, Season, Table)    2) Clock draw: NORMAL  3) 3 item recall: Recalls 3 objects  Results: 3 items recalled: COGNITIVE IMPAIRMENT LESS LIKELY    Mini-CogTM Copyright S [place]. Licensed by the author for use in [facility]; reprinted with permission (darya@.[facility]). All rights reserved.      Do you have sleep apnea, excessive snoring or daytime drowsiness?: yes    Reviewed and updated as needed this visit by clinical staff  Tobacco  Allergies  Meds  Med Hx  Surg Hx  Fam Hx  Soc Hx        Reviewed and updated as needed this visit by Provider  Meds        Social History   Substance Use Topics " "    Smoking status: Never Smoker     Smokeless tobacco: Never Used     Alcohol use No       Alcohol Use 11/28/2018   If you drink alcohol do you typically have greater than 3 drinks per day OR greater than 7 drinks per week? No   No flowsheet data found.        Current providers sharing in care for this patient include:   Patient Care Team:  Claire Gross MD as PCP - General (Internal Medicine)    The following health maintenance items are reviewed in Epic and correct as of today:  Health Maintenance   Topic Date Due     FALL RISK ASSESSMENT  04/23/2019     PHQ-2 Q1 YR  04/23/2019     TETANUS IMMUNIZATION (SYSTEM ASSIGNED)  02/24/2023     ADVANCE DIRECTIVE PLANNING Q5 YRS  09/26/2023     DEXA SCAN SCREENING (SYSTEM ASSIGNED)  Completed     PNEUMOCOCCAL  Completed     INFLUENZA VACCINE  Completed           Review of Systems   Constitutional: Negative for chills.   HENT: Positive for hearing loss. Negative for congestion, ear pain and sore throat.    Eyes: Negative for pain and visual disturbance.   Respiratory: Negative for cough and shortness of breath.    Cardiovascular: Negative for chest pain, palpitations and peripheral edema.   Gastrointestinal: Negative for abdominal pain, constipation, diarrhea, heartburn, hematochezia and nausea.   Breasts:  Negative for tenderness, breast mass and discharge.   Genitourinary: Negative for dysuria, frequency, genital sores, hematuria, pelvic pain, urgency, vaginal bleeding and vaginal discharge.   Musculoskeletal: Positive for arthralgias and joint swelling. Negative for myalgias.   Skin: Positive for rash.   Neurological: Negative for dizziness, weakness, headaches and paresthesias.   Psychiatric/Behavioral: Negative for mood changes.       OBJECTIVE:   /82  Pulse 96  Temp 98.3  F (36.8  C) (Oral)  Resp 13  Ht 5' 2\" (1.575 m)  Wt 135 lb 14.4 oz (61.6 kg)  LMP  (LMP Unknown)  SpO2 96%  BMI 24.86 kg/m2 Estimated body mass index is 24.86 kg/(m^2) as " "calculated from the following:    Height as of this encounter: 5' 2\" (1.575 m).    Weight as of this encounter: 135 lb 14.4 oz (61.6 kg).  Physical Exam  GENERAL APPEARANCE: healthy, alert and no distress  EYES: Eyes grossly normal to inspection, PERRL and conjunctivae and sclerae normal  HENT: ear canals and TM's normal, nose and mouth without ulcers or lesions, oropharynx clear and oral mucous membranes moist  NECK: no adenopathy, no asymmetry, masses, or scars and thyroid normal to palpation  RESP: lungs clear to auscultation - no rales, rhonchi or wheezes  BREAST: normal without masses, tenderness or nipple discharge and no palpable axillary masses or adenopathy  CV: regular rate and rhythm, normal S1 S2, no S3 or S4, no murmur, click or rub, no peripheral edema and peripheral pulses strong  ABDOMEN: soft, nontender, no hepatosplenomegaly, no masses and bowel sounds normal  MS: no musculoskeletal defects are noted and gait is age appropriate without ataxia  SKIN: no suspicious lesions or rashes  NEURO: Normal strength and tone, sensory exam grossly normal, mentation intact and speech normal  PSYCH: mentation appears normal and affect normal/bright      ASSESSMENT / PLAN:       ICD-10-CM    1. Encounter for routine adult health examination without abnormal findings Z00.00 Comprehensive metabolic panel     Lipid panel reflex to direct LDL Fasting     CBC with platelets differential     TSH with free T4 reflex   2. Hyperlipidemia LDL goal <130 E78.5 simvastatin (ZOCOR) 20 MG tablet     Comprehensive metabolic panel     Lipid panel reflex to direct LDL Fasting   3. Benign hypertension I10 amLODIPine (NORVASC) 5 MG tablet     CBC with platelets differential   4. Subclinical hypothyroidism E03.9 TSH with free T4 reflex       End of Life Planning:  Patient currently has an advanced directive: Yes.  Practitioner is supportive of decision.    COUNSELING:  Reviewed preventive health counseling, as reflected in patient " "instructions    BP Readings from Last 1 Encounters:   11/28/18 128/82     Estimated body mass index is 24.86 kg/(m^2) as calculated from the following:    Height as of this encounter: 5' 2\" (1.575 m).    Weight as of this encounter: 135 lb 14.4 oz (61.6 kg).           reports that she has never smoked. She has never used smokeless tobacco.      Appropriate preventive services were discussed with this patient, including applicable screening as appropriate for cardiovascular disease, diabetes, osteopenia/osteoporosis, and glaucoma.  As appropriate for age/gender, discussed screening for colorectal cancer, prostate cancer, breast cancer, and cervical cancer. Checklist reviewing preventive services available has been given to the patient.    Reviewed patients plan of care and provided an AVS. The Basic Care Plan (routine screening as documented in Health Maintenance) for Vicky meets the Care Plan requirement. This Care Plan has been established and reviewed with the Patient.    Counseling Resources:  ATP IV Guidelines  Pooled Cohorts Equation Calculator  Breast Cancer Risk Calculator  FRAX Risk Assessment  ICSI Preventive Guidelines  Dietary Guidelines for Americans, 2010  USDA's MyPlate  ASA Prophylaxis  Lung CA Screening    Claire Gross MD  [facility]  "

## 2018-11-28 NOTE — MR AVS SNAPSHOT
After Visit Summary   11/28/2018    Vicky Kang    MRN: [id number]           Patient Information     Date Of Birth          1935        Visit Information        Provider Department      11/28/2018 9:40 AM Claire Gross MD [facility]        Today's Diagnoses     Encounter for routine adult health examination without abnormal findings    -  1    Hyperlipidemia LDL goal <130        Benign hypertension        Subclinical hypothyroidism          Care Instructions      Preventive Health Recommendations    See your health care provider every year to    Review health changes.     Discuss preventive care.      Review your medicines if your doctor has prescribed any.      You no longer need a yearly Pap test unless you've had an abnormal Pap test in the past 10 years. If you have vaginal symptoms, such as bleeding or discharge, be sure to talk with your provider about a Pap test.      Every 1 to 2 years, have a mammogram.  If you are over 69, talk with your health care provider about whether or not you want to continue having screening mammograms.      Every 10 years, have a colonoscopy. Or, have a yearly FIT test (stool test). These exams will check for colon cancer.       Have a cholesterol test every 5 years, or more often if your doctor advises it.       Have a diabetes test (fasting glucose) every three years. If you are at risk for diabetes, you should have this test more often.       At age 65, have a bone density scan (DEXA) to check for osteoporosis (brittle bone disease).    Shots:    Get a flu shot each year.    Get a tetanus shot every 10 years.    Talk to your doctor about your pneumonia vaccines. There are now two you should receive - Pneumovax (PPSV 23) and Prevnar (PCV 13).    Talk to your pharmacist about the shingles vaccine.    Talk to your doctor about the hepatitis B vaccine.    Nutrition:     Eat at least 5 servings of fruits and vegetables each day.      Eat  whole-grain bread, whole-wheat pasta and brown rice instead of white grains and rice.      Get adequate Calcium and Vitamin D.     Lifestyle    Exercise at least 150 minutes a week (30 minutes a day, 5 days a week). This will help you control your weight and prevent disease.      Limit alcohol to one drink per day.      No smoking.       Wear sunscreen to prevent skin cancer.       See your dentist twice a year for an exam and cleaning.      See your eye doctor every 1 to 2 years to screen for conditions such as glaucoma, macular degeneration and cataracts.    Personalized Prevention Plan  You are due for the preventive services outlined below.  Your care team is available to assist you in scheduling these services.  If you have already completed any of these items, please share that information with your care team to update in your medical record.  There are no preventive care reminders to display for this patient.          Follow-ups after your visit        Your next 10 appointments already scheduled     Dec 06, 2018  3:30 PM CST   Nurse [facility] with [facility]    [address] [place]  [facility] [id number] [phone]              Who to contact     If you have questions or need follow up information about today's clinic visit or your schedule please contact [facility] directly at [phone].  Normal or non-critical lab and imaging results will be communicated to you by Kutotohart, letter or phone within 4 business days after the clinic has received the results. If you do not hear from us within 7 days, please contact the clinic through Kutot[employer] or phone. If you have a critical or abnormal lab result, we will notify you by phone as soon as possible.  Submit refill requests through [employer] or call your pharmacy and they will forward the refill request to us. Please allow 3 business days for your refill to be completed.           "Additional Information About Your Visit        MyChart Information     [employer] gives you secure access to your electronic health record. If you see a primary care provider, you can also send messages to your care team and make appointments. If you have questions, please call your primary care clinic.  If you do not have a primary care provider, please call [phone] and they will assist you.        Care EveryWhere ID     This is your Care EveryWhere ID. This could be used by other organizations to access your [facility] medical records  [id number]        Your Vitals Were     Pulse Temperature Respirations Height Last Period Pulse Oximetry    96 98.3  F (36.8  C) (Oral) 13 5' 2\" (1.575 m) (LMP Unknown) 96%    BMI (Body Mass Index)                   24.86 kg/m2            Blood Pressure from Last 3 Encounters:   11/28/18 128/82   09/26/18 150/70   09/20/18 136/74    Weight from Last 3 Encounters:   11/28/18 135 lb 14.4 oz (61.6 kg)   09/26/18 133 lb 9.6 oz (60.6 kg)   09/20/18 134 lb 9.6 oz (61.1 kg)              We Performed the Following     CBC with platelets differential     Comprehensive metabolic panel     Lipid panel reflex to direct LDL Fasting     TSH with free T4 reflex          Today's Medication Changes          These changes are accurate as of 11/28/18 10:22 AM.  If you have any questions, ask your nurse or doctor.               These medicines have changed or have updated prescriptions.        Dose/Directions    amLODIPine 5 MG tablet   Commonly known as:  NORVASC   This may have changed:  See the new instructions.   Used for:  Benign hypertension   Changed by:  Claire Gross MD        Dose:  5 mg   Take 1 tablet (5 mg) by mouth daily   Quantity:  90 tablet   Refills:  4            Where to get your medicines      These medications were sent to [facility] [address] [place] [address] [id number] [place] [id number]     Phone:  [phone]     amLODIPine 5 " MG tablet    simvastatin 20 MG tablet                Primary Care Provider Office Phone # Fax #    Claire Gross [phone]       [address] [facility] [address]        Equal Access to Services     DAVID SANTOS : Hadanita dl gabriel gillo Sotasiaali, waaxda luqadaha, qaybta kaalmada adeegyada, fiorella hernandezn susan clark laSamanthajeyson chris. So [facility] [phone].    ATENCIÓN: Si habla español, tiene a greer disposición servicios gratuitos de asistencia lingüística. Llame al [phone].    We comply with applicable federal civil rights laws and [place] laws. We do not discriminate on the basis of race, color, national origin, age, disability, sex, sexual orientation, or gender identity.            Thank you!     Thank you for choosing [facility]  for your care. Our goal is always to provide you with excellent care. Hearing back from our patients is one way we can continue to improve our services. Please take a few minutes to complete the written survey that you may receive in the mail after your visit with us. Thank you!             Your Updated Medication List - Protect others around you: Learn how to safely use, store and throw away your medicines at www.disposemymeds.org.          This list is accurate as of 11/28/18 10:22 AM.  Always use your most recent med list.                   Brand Name Dispense Instructions for use Diagnosis    amLODIPine 5 MG tablet    NORVASC    90 tablet    Take 1 tablet (5 mg) by mouth daily    Benign hypertension       CALCIUM 600 + D PO      Take  by mouth 2 times daily.        losartan-hydrochlorothiazide 50-12.5 MG per tablet    HYZAAR    90 tablet    Take 1 tablet by mouth daily    Benign hypertension       metoprolol succinate 50 MG 24 hr tablet    TOPROL-XL    90 tablet    TAKE 1 TABLET BY MOUTH ONCE DAILY    Benign hypertension       simvastatin 20 MG tablet    ZOCOR    90 tablet    TAKE ONE TABLET BY MOUTH ONCE DAILY AT BEDTIME     Hyperlipidemia LDL goal <130

## 2018-11-28 NOTE — PATIENT INSTRUCTIONS

## 2018-11-29 LAB
ALBUMIN SERPL-MCNC: 3.8 G/DL (ref 3.4–5)
ALP SERPL-CCNC: 73 U/L (ref 40–150)
ALT SERPL W P-5'-P-CCNC: 25 U/L (ref 0–50)
ANION GAP SERPL CALCULATED.3IONS-SCNC: 11 MMOL/L (ref 3–14)
AST SERPL W P-5'-P-CCNC: 22 U/L (ref 0–45)
BILIRUB SERPL-MCNC: 0.6 MG/DL (ref 0.2–1.3)
BUN SERPL-MCNC: 16 MG/DL (ref 7–30)
CALCIUM SERPL-MCNC: 9.2 MG/DL (ref 8.5–10.1)
CHLORIDE SERPL-SCNC: 106 MMOL/L (ref 94–109)
CHOLEST SERPL-MCNC: 168 MG/DL
CO2 SERPL-SCNC: 24 MMOL/L (ref 20–32)
CREAT SERPL-MCNC: 0.62 MG/DL (ref 0.52–1.04)
GFR SERPL CREATININE-BSD FRML MDRD: >90 ML/MIN/1.7M2
GLUCOSE SERPL-MCNC: 106 MG/DL (ref 70–99)
HDLC SERPL-MCNC: 47 MG/DL
LDLC SERPL CALC-MCNC: 62 MG/DL
NONHDLC SERPL-MCNC: 121 MG/DL
POTASSIUM SERPL-SCNC: 3.6 MMOL/L (ref 3.4–5.3)
PROT SERPL-MCNC: 7.6 G/DL (ref 6.8–8.8)
SODIUM SERPL-SCNC: 141 MMOL/L (ref 133–144)
TRIGL SERPL-MCNC: 293 MG/DL
TSH SERPL DL<=0.005 MIU/L-ACNC: 1.76 MU/L (ref 0.4–4)

## 2018-11-30 ENCOUNTER — TELEPHONE (OUTPATIENT)
Dept: INTERNAL MEDICINE | Facility: CLINIC | Age: 83
End: 2018-11-30

## 2018-11-30 DIAGNOSIS — I10 BENIGN HYPERTENSION: ICD-10-CM

## 2018-11-30 RX ORDER — LOSARTAN POTASSIUM AND HYDROCHLOROTHIAZIDE 12.5; 5 MG/1; MG/1
1 TABLET ORAL DAILY
Qty: 90 TABLET | Refills: 4 | Status: SHIPPED | OUTPATIENT
Start: 2018-11-30 | End: 2019-11-15

## 2018-11-30 NOTE — TELEPHONE ENCOUNTER
"Requested Prescriptions   Pending Prescriptions Disp Refills     losartan-hydrochlorothiazide (HYZAAR) 50-12.5 MG tablet 90 tablet 4     Sig: Take 1 tablet by mouth daily    Angiotensin-II Receptors Passed    11/30/2018  4:49 PM       Passed - Blood pressure under 140/90 in past 12 months    BP Readings from Last 3 Encounters:   11/28/18 128/82   09/26/18 150/70   09/20/18 136/74                Passed - Recent (12 mo) or future (30 days) visit within the authorizing provider's specialty    Patient had office visit in the last 12 months or has a visit in the next 30 days with authorizing provider or within the authorizing provider's specialty.  See \"Patient Info\" tab in inbasket, or \"Choose Columns\" in Meds & Orders section of the refill encounter.             Passed - Patient is age 18 or older       Passed - No active pregnancy on record       Passed - Normal serum creatinine on file in past 12 months    Recent Labs   Lab Test  11/28/18   1034   CR  0.62            Passed - Normal serum potassium on file in past 12 months    Recent Labs   Lab Test  11/28/18   1034   POTASSIUM  3.6                   Passed - No positive pregnancy test in past 12 months        Prescription approved per [facility] Refill Protocol.    "

## 2018-12-04 ENCOUNTER — TELEPHONE (OUTPATIENT)
Dept: INTERNAL MEDICINE | Facility: CLINIC | Age: 83
End: 2018-12-04

## 2018-12-04 ENCOUNTER — ALLIED HEALTH/NURSE VISIT (OUTPATIENT)
Dept: NURSING | Facility: CLINIC | Age: 83
End: 2018-12-04
Payer: COMMERCIAL

## 2018-12-04 DIAGNOSIS — Z23 NEED FOR VACCINATION: Primary | ICD-10-CM

## 2018-12-04 DIAGNOSIS — R22.1 LUMP IN NECK: Primary | ICD-10-CM

## 2018-12-04 PROCEDURE — 90750 HZV VACC RECOMBINANT IM: CPT

## 2018-12-04 PROCEDURE — 90471 IMMUNIZATION ADMIN: CPT

## 2018-12-04 PROCEDURE — 99207 ZZC NO CHARGE LOS: CPT

## 2018-12-04 NOTE — NURSING NOTE
Screening Questionnaire for Adult Immunization    Are you sick today?   No   Do you have allergies to medications, food, a vaccine component or latex?   No   Have you ever had a serious reaction after receiving a vaccination?   No   Do you have a long-term health problem with heart disease, lung disease, asthma, kidney disease, metabolic disease (e.g. diabetes), anemia, or other blood disorder?   No   Do you have cancer, leukemia, HIV/AIDS, or any other immune system problem?   No   In the past 3 months, have you taken medications that affect  your immune system, such as prednisone, other steroids, or anticancer drugs; drugs for the treatment of rheumatoid arthritis, Crohn s disease, or psoriasis; or have you had radiation treatments?   No   Have you had a seizure, or a brain or other nervous system problem?   No   During the past year, have you received a transfusion of blood or blood     products, or been given immune (gamma) globulin or antiviral drug?   No   For women: Are you pregnant or is there a chance you could become        pregnant during the next month?   No   Have you received any vaccinations in the past 4 weeks?   No     Immunization questionnaire answers were all negative.        Per orders of Dr. Gross, injection of Shinrix  given by Sabiha Bustamante. Patient instructed to remain in clinic for 15 minutes afterwards, and to report any adverse reaction to me immediately.       Screening performed by Sabiha Bustamante on 12/4/2018 at 2:53 PM.

## 2018-12-04 NOTE — TELEPHONE ENCOUNTER
"Patient was in for Shinrix and was wondering about Needing  A \"pitchure of lump in neck \" she has not heard from any one - I donut see any orders , provider please advise     Patient is aware that Doctor is out and is willing to wait until her return     CA Bustamante LPN    "

## 2018-12-04 NOTE — MR AVS SNAPSHOT
After Visit Summary   12/4/2018    Vicky Kang    MRN: [id number]           Patient Information     Date Of Birth          1935        Visit Information        Provider Department      12/4/2018 2:30 PM RI IM NURSE [facility]        Today's Diagnoses     Need for vaccination    -  1       Follow-ups after your visit        Who to contact     If you have questions or need follow up information about today's clinic visit or your schedule please contact [facility] directly at [phone].  Normal or non-critical lab and imaging results will be communicated to you by Cafe Enterpriseshart, letter or phone within 4 business days after the clinic has received the results. If you do not hear from us within 7 days, please contact the clinic through [employer] or phone. If you have a critical or abnormal lab result, we will notify you by phone as soon as possible.  Submit refill requests through [employer] or call your pharmacy and they will forward the refill request to us. Please allow 3 business days for your refill to be completed.          Additional Information About Your Visit        MyChart Information     [employer] gives you secure access to your electronic health record. If you see a primary care provider, you can also send messages to your care team and make appointments. If you have questions, please call your primary care clinic.  If you do not have a primary care provider, please call [phone] and they will assist you.        Care EveryWhere ID     This is your Care EveryWhere ID. This could be used by other organizations to access your [place] medical records  [id number]        Your Vitals Were     Last Period                   (LMP Unknown)            Blood Pressure from Last 3 Encounters:   11/28/18 128/82   09/26/18 150/70   09/20/18 136/74    Weight from Last 3 Encounters:   11/28/18 135 lb 14.4 oz (61.6 kg)   09/26/18 133 lb 9.6 oz (60.6 kg)   09/20/18 134 lb 9.6  oz (61.1 kg)              We Performed the Following     1st  Administration  [[id number]]     ZOSTER VACCINE RECOMBINANT ADJUVANTED IM NJX        Primary Care Provider Office Phone # Fax #    Claire Gross [phone]       [address] [facility] [address]        Equal Access to Services     DAVID SANTOS : Hadii aad ku hadasho Soomaali, waaxda luqadaha, qaybta kaalmada adeegyada, waxay idiin hayaan adeeg khmónica la'alessandran . So [facility] [phone].    ATENCIÓN: Si habla español, tiene a greer disposición servicios gratuitos de asistencia lingüística. [facility] [phone].    We comply with applicable federal civil rights laws and [place] laws. We do not discriminate on the basis of race, color, national origin, age, disability, sex, sexual orientation, or gender identity.            Thank you!     Thank you for choosing [facility]  for your care. Our goal is always to provide you with excellent care. Hearing back from our patients is one way we can continue to improve our services. Please take a few minutes to complete the written survey that you may receive in the mail after your visit with us. Thank you!             Your Updated Medication List - Protect others around you: Learn how to safely use, store and throw away your medicines at www.disposemymeds.org.          This list is accurate as of 12/4/18  4:03 PM.  Always use your most recent med list.                   Brand Name Dispense Instructions for use Diagnosis    amLODIPine 5 MG tablet    NORVASC    90 tablet    Take 1 tablet (5 mg) by mouth daily    Benign hypertension       CALCIUM 600 + D PO      Take  by mouth 2 times daily.        losartan-hydrochlorothiazide 50-12.5 MG tablet    HYZAAR    90 tablet    Take 1 tablet by mouth daily    Benign hypertension       metoprolol succinate ER 50 MG 24 hr tablet    TOPROL-XL    90 tablet    TAKE 1 TABLET BY MOUTH ONCE DAILY    Benign hypertension       simvastatin 20 MG  tablet    ZOCOR    90 tablet    TAKE ONE TABLET BY MOUTH ONCE DAILY AT BEDTIME    Hyperlipidemia LDL goal <130

## 2018-12-27 NOTE — TELEPHONE ENCOUNTER
I apologize I do not have this documented in her annual wellness.  Please find out where her lump is located.  Then  I can order ultrasound of neck.  If lump persists, could have her see ENT

## 2018-12-28 NOTE — TELEPHONE ENCOUNTER
Patient reports that the lump comes and goes on left side of neck.    Ultrasound ordered.    Patient also has ENT referral

## 2019-07-01 ENCOUNTER — TRANSFERRED RECORDS (OUTPATIENT)
Dept: HEALTH INFORMATION MANAGEMENT | Facility: CLINIC | Age: 84
End: 2019-07-01

## 2019-09-16 DIAGNOSIS — I10 BENIGN HYPERTENSION: ICD-10-CM

## 2019-09-16 NOTE — TELEPHONE ENCOUNTER
"Requested Prescriptions   Pending Prescriptions Disp Refills     metoprolol succinate ER (TOPROL-XL) 50 MG 24 hr tablet [Pharmacy Med Name:  Last Written Prescription Date:  11/13/2018  Last Fill Quantity: 90,  # refills: 2   Last office visit: 11/28/2018 with prescribing provider:     Future Office Visit:   METOPROLOL ER SUCCINATE 50MG TABS] 90 tablet 0     Sig: TAKE 1 TABLET BY MOUTH DAILY       Beta-Blockers Protocol Passed - 9/16/2019  2:22 PM        Passed - Blood pressure under 140/90 in past 12 months     BP Readings from Last 3 Encounters:   11/28/18 128/82   09/26/18 150/70   09/20/18 136/74                 Passed - Patient is age 6 or older        Passed - Recent (12 mo) or future (30 days) visit within the authorizing provider's specialty     Patient had office visit in the last 12 months or has a visit in the next 30 days with authorizing provider or within the authorizing provider's specialty.  See \"Patient Info\" tab in inbasket, or \"Choose Columns\" in Meds & Orders section of the refill encounter.              Passed - Medication is active on med list        "

## 2019-09-18 RX ORDER — METOPROLOL SUCCINATE 50 MG/1
TABLET, EXTENDED RELEASE ORAL
Qty: 90 TABLET | Refills: 0 | Status: SHIPPED | OUTPATIENT
Start: 2019-09-18 | End: 2019-11-21

## 2019-09-18 NOTE — TELEPHONE ENCOUNTER
Medication is being filled for 1 time refill only due to:  pt will be due in Nov 2019 for an appt.     [employer] message sent.

## 2019-10-02 ENCOUNTER — HEALTH MAINTENANCE LETTER (OUTPATIENT)
Age: 84
End: 2019-10-02

## 2019-11-15 DIAGNOSIS — I10 BENIGN HYPERTENSION: ICD-10-CM

## 2019-11-18 RX ORDER — LOSARTAN POTASSIUM AND HYDROCHLOROTHIAZIDE 12.5; 5 MG/1; MG/1
1 TABLET ORAL DAILY
Qty: 90 TABLET | Refills: 0 | Status: SHIPPED | OUTPATIENT
Start: 2019-11-18 | End: 2020-02-14

## 2019-11-18 NOTE — TELEPHONE ENCOUNTER
"Requested Prescriptions   Pending Prescriptions Disp Refills     losartan-hydrochlorothiazide (HYZAAR) 50-12.5 MG tablet [Pharmacy Med Name: LOSARTAN/HCTZ 50/12.5MG TABLETS] 90 tablet 0     Sig: TAKE 1 TABLET BY MOUTH DAILY       Angiotensin-II Receptors Passed - 11/15/2019  5:08 PM        Passed - Last blood pressure under 140/90 in past 12 months     BP Readings from Last 3 Encounters:   11/28/18 128/82   09/26/18 150/70   09/20/18 136/74                 Passed - Recent (12 mo) or future (30 days) visit within the authorizing provider's specialty     Patient has had an office visit with the authorizing provider or a provider within the authorizing providers department within the previous 12 mos or has a future within next 30 days. See \"Patient Info\" tab in inbasket, or \"Choose Columns\" in Meds & Orders section of the refill encounter.              Passed - Medication is active on med list        Passed - Patient is age 18 or older        Passed - No active pregnancy on record        Passed - Normal serum creatinine on file in past 12 months     Recent Labs   Lab Test 11/28/18  1034   CR 0.62             Passed - Normal serum potassium on file in past 12 months     Recent Labs   Lab Test 11/28/18  1034   POTASSIUM 3.6                    Passed - No positive pregnancy test in past 12 months        Last Written Prescription Date:  11/30/18  Last Fill Quantity: 90,  # refills: 4   Last office visit: 11/28/2018 with prescribing provider:  11/28/18   Future Office Visit:   Next 5 appointments (look out 90 days)    Dec 11, 2019 10:00 AM CST  PHYSICAL with Claire Gross MD  [facility] ([facility]) [address]  [facility] [address] [phone]           "

## 2019-11-19 NOTE — TELEPHONE ENCOUNTER
Medication is being filled for 1 time refill only due to:  Patient needs to be seen because it has been more than one year since last visit.   Pt is scheduled.  Haydee Melgar RN

## 2019-11-21 DIAGNOSIS — I10 BENIGN HYPERTENSION: ICD-10-CM

## 2019-11-21 RX ORDER — METOPROLOL SUCCINATE 50 MG/1
TABLET, EXTENDED RELEASE ORAL
Qty: 90 TABLET | Refills: 0 | Status: SHIPPED | OUTPATIENT
Start: 2019-11-21 | End: 2020-02-14

## 2019-11-21 NOTE — TELEPHONE ENCOUNTER
"Requested Prescriptions   Pending Prescriptions Disp Refills     metoprolol succinate ER (TOPROL-XL) 50 MG 24 hr tablet [Pharmacy Med Name: METOPROLOL ER SUCCINATE 50MG TABS] 90 tablet 0     Sig: TAKE 1 TABLET BY MOUTH EVERY DAY       Beta-Blockers Protocol Passed - 11/21/2019  5:11 AM        Passed - Blood pressure under 140/90 in past 12 months     BP Readings from Last 3 Encounters:   11/28/18 128/82   09/26/18 150/70   09/20/18 136/74                 Passed - Patient is age 6 or older        Passed - Recent (12 mo) or future (30 days) visit within the authorizing provider's specialty     Patient has had an office visit with the authorizing provider or a provider within the authorizing providers department within the previous 12 mos or has a future within next 30 days. See \"Patient Info\" tab in inbasket, or \"Choose Columns\" in Meds & Orders section of the refill encounter.              Passed - Medication is active on med list        Last Written Prescription Date:  9/18/19  Last Fill Quantity: 90,  # refills: 0   Last office visit: 11/28/2018 with prescribing provider:  11/28/18   Future Office Visit:   Next 5 appointments (look out 90 days)    Dec 11, 2019 10:00 AM CST  PHYSICAL with Claire Gross MD  [facility] ([facility]) [address]  [facility] [id number] [phone]           "

## 2019-12-10 ENCOUNTER — OFFICE VISIT (OUTPATIENT)
Dept: INTERNAL MEDICINE | Facility: CLINIC | Age: 84
End: 2019-12-10
Payer: COMMERCIAL

## 2019-12-10 VITALS
WEIGHT: 138.9 LBS | OXYGEN SATURATION: 93 % | HEART RATE: 110 BPM | HEIGHT: 62 IN | RESPIRATION RATE: 16 BRPM | SYSTOLIC BLOOD PRESSURE: 174 MMHG | TEMPERATURE: 98.4 F | BODY MASS INDEX: 25.56 KG/M2 | DIASTOLIC BLOOD PRESSURE: 60 MMHG

## 2019-12-10 DIAGNOSIS — Z00.00 HEALTHCARE MAINTENANCE: Primary | ICD-10-CM

## 2019-12-10 DIAGNOSIS — Z78.0 ASYMPTOMATIC MENOPAUSAL STATE: ICD-10-CM

## 2019-12-10 DIAGNOSIS — E78.5 HYPERLIPIDEMIA LDL GOAL <130: ICD-10-CM

## 2019-12-10 DIAGNOSIS — E03.8 SUBCLINICAL HYPOTHYROIDISM: ICD-10-CM

## 2019-12-10 DIAGNOSIS — I10 BENIGN HYPERTENSION: ICD-10-CM

## 2019-12-10 LAB
ERYTHROCYTE [DISTWIDTH] IN BLOOD BY AUTOMATED COUNT: 12.4 % (ref 10–15)
HCT VFR BLD AUTO: 41.7 % (ref 35–47)
HGB BLD-MCNC: 13.4 G/DL (ref 11.7–15.7)
MCH RBC QN AUTO: 31.1 PG (ref 26.5–33)
MCHC RBC AUTO-ENTMCNC: 32.1 G/DL (ref 31.5–36.5)
MCV RBC AUTO: 97 FL (ref 78–100)
PLATELET # BLD AUTO: 331 10E9/L (ref 150–450)
RBC # BLD AUTO: 4.31 10E12/L (ref 3.8–5.2)
WBC # BLD AUTO: 10.3 10E9/L (ref 4–11)

## 2019-12-10 PROCEDURE — 80053 COMPREHEN METABOLIC PANEL: CPT | Performed by: NURSE PRACTITIONER

## 2019-12-10 PROCEDURE — 80061 LIPID PANEL: CPT | Performed by: NURSE PRACTITIONER

## 2019-12-10 PROCEDURE — 85027 COMPLETE CBC AUTOMATED: CPT | Performed by: NURSE PRACTITIONER

## 2019-12-10 PROCEDURE — 99397 PER PM REEVAL EST PAT 65+ YR: CPT | Performed by: NURSE PRACTITIONER

## 2019-12-10 PROCEDURE — 84443 ASSAY THYROID STIM HORMONE: CPT | Performed by: NURSE PRACTITIONER

## 2019-12-10 PROCEDURE — 36415 COLL VENOUS BLD VENIPUNCTURE: CPT | Performed by: NURSE PRACTITIONER

## 2019-12-10 ASSESSMENT — ENCOUNTER SYMPTOMS
DIZZINESS: 0
HEMATURIA: 0
CONSTIPATION: 0
COUGH: 0
FEVER: 0
EYE PAIN: 0
NERVOUS/ANXIOUS: 0
DIARRHEA: 0
CHILLS: 0
HEMATOCHEZIA: 0
ABDOMINAL PAIN: 0

## 2019-12-10 ASSESSMENT — ACTIVITIES OF DAILY LIVING (ADL): CURRENT_FUNCTION: NO ASSISTANCE NEEDED

## 2019-12-10 ASSESSMENT — MIFFLIN-ST. JEOR: SCORE: 1033.3

## 2019-12-10 NOTE — NURSING NOTE
"Patient here for a wellness visit and is fasting.  BP (!) 174/60 (BP Location: Right arm, Patient Position: Sitting, Cuff Size: Adult Regular)   Pulse 110   Temp 98.4  F (36.9  C) (Oral)   Resp 16   Ht 5' 2\" (1.575 m)   Wt 138 lb 14.4 oz (63 kg)   LMP  (LMP Unknown)   SpO2 93%   BMI 25.41 kg/m      "

## 2019-12-10 NOTE — PROGRESS NOTES
"SUBJECTIVE:   Vicky Kang is a 84 year old female who presents for Preventive Visit.      Are you in the first 12 months of your Medicare coverage?  No    Healthy Habits:     In general, how would you rate your overall health?  Excellent    Frequency of exercise:  2-3 days/week    Duration of exercise:  15-30 minutes    Do you usually eat at least 4 servings of fruit and vegetables a day, include whole grains    & fiber and avoid regularly eating high fat or \"junk\" foods?  Yes    Taking medications regularly:  Yes    Medication side effects:  None    Ability to successfully perform activities of daily living:  No assistance needed    Home Safety:  No safety concerns identified    Hearing Impairment:  Find that men's voices are easier to understand than woman's and difficulty understanding speech on the telephone    In the past 6 months, have you been bothered by leaking of urine?  No    In general, how would you rate your overall mental or emotional health?  Excellent      PHQ-2 Total Score: 0    Additional concerns today:  No    Do you feel safe in your environment? Yes    Have you ever done Advance Care Planning? (For example, a Health Directive, POLST, or a discussion with a medical provider or your loved ones about your wishes): Yes, patient states has an Advance Care Planning document and will bring a copy to the clinic.      Fall risk       Cognitive Screening   1) Repeat 3 items (Leader, Season, Table)    2) Clock draw: NORMAL  3) 3 item recall: Recalls 3 objects  Results: 3 items recalled: COGNITIVE IMPAIRMENT LESS LIKELY    Mini-CogTM Copyright S [employer]. Licensed by the author for use in [facility]; reprinted with permission ([email or web address]@.[email or web address]). All rights reserved.      Do you have sleep apnea, excessive snoring or daytime drowsiness?: no    Reviewed and updated as needed this visit by clinical staff  Tobacco  Allergies  Meds  Med Hx  Surg Hx  Fam Hx  Soc Hx        Reviewed and updated as " needed this visit by Provider        Social History     Tobacco Use     Smoking status: Never Smoker     Smokeless tobacco: Never Used   Substance Use Topics     Alcohol use: No     Alcohol/week: 0.0 standard drinks     If you drink alcohol do you typically have >3 drinks per day or >7 drinks per week? No    Alcohol Use 12/10/2019   Prescreen: >3 drinks/day or >7 drinks/week? No   Prescreen: >3 drinks/day or >7 drinks/week? -               Current providers sharing in care for this patient include:   Patient Care Team:  Claire Gross MD as PCP - General (Internal Medicine)  Claire Gross MD as Assigned PCP    The following health maintenance items are reviewed in Epic and correct as of today:  Health Maintenance   Topic Date Due     PHQ-2  01/01/2019     FALL RISK ASSESSMENT  11/28/2019     MEDICARE ANNUAL WELLNESS VISIT  11/28/2019     DTAP/TDAP/TD IMMUNIZATION (2 - Td) 02/24/2023     ADVANCE CARE PLANNING  09/26/2023     DEXA  Completed     INFLUENZA VACCINE  Completed     PNEUMOCOCCAL IMMUNIZATION 65+ LOW/MEDIUM RISK  Completed     ZOSTER IMMUNIZATION  Completed     IPV IMMUNIZATION  Aged Out     MENINGITIS IMMUNIZATION  Aged Out     BP Readings from Last 3 Encounters:   12/10/19 (!) 174/60   11/28/18 128/82   09/26/18 150/70    Wt Readings from Last 3 Encounters:   12/10/19 63 kg (138 lb 14.4 oz)   11/28/18 61.6 kg (135 lb 14.4 oz)   09/26/18 60.6 kg (133 lb 9.6 oz)                  Patient Active Problem List   Diagnosis     Advanced directives, counseling/discussion     Benign hypertension     Hyperlipidemia LDL goal <130     Neuropathy     Preventative health care     Osteopenia     Abnormal finding on thyroid function test     Thyroid nodule     Subclinical hypothyroidism     Past Surgical History:   Procedure Laterality Date     CATARACT IOL, RT/LT       ENT SURGERY   Spring 2017    throat cyst removal     PHACOEMULSIFICATION CLEAR CORNEA WITH STANDARD INTRAOCULAR LENS IMPLANT  1/5/2012     "Procedure:PHACOEMULSIFICATION CLEAR CORNEA WITH STANDARD INTRAOCULAR LENS IMPLANT; LEFT PHACOEMULSIFICATION CLEAR CORNEA WITH STANDARD LENS IMPLANT ; Surgeon:TUNDE GARDNER; Location: [facility]     VITRECTOMY ANTERIOR         Social History     Tobacco Use     Smoking status: Never Smoker     Smokeless tobacco: Never Used   Substance Use Topics     Alcohol use: No     Alcohol/week: 0.0 standard drinks     Family History   Problem Relation Age of Onset     Cancer Mother      Family History Negative Father          Current Outpatient Medications   Medication Sig Dispense Refill     amLODIPine (NORVASC) 5 MG tablet Take 1 tablet (5 mg) by mouth daily 90 tablet 4     losartan-hydrochlorothiazide (HYZAAR) 50-12.5 MG tablet TAKE 1 TABLET BY MOUTH DAILY 90 tablet 0     metoprolol succinate ER (TOPROL-XL) 50 MG 24 hr tablet TAKE 1 TABLET BY MOUTH EVERY DAY 90 tablet 0     simvastatin (ZOCOR) 20 MG tablet TAKE ONE TABLET BY MOUTH ONCE DAILY AT BEDTIME 90 tablet 4     Mammogram Screening: Patient over age 75, has elected to continue with mammography screening.    Review of Systems   Constitutional: Negative for chills and fever.   HENT: Positive for congestion. Negative for ear pain.    Eyes: Negative for pain.   Respiratory: Negative for cough.    Cardiovascular: Negative for chest pain.   Gastrointestinal: Negative for abdominal pain, constipation, diarrhea and hematochezia.   Genitourinary: Negative for hematuria.   Neurological: Negative for dizziness.   Psychiatric/Behavioral: The patient is not nervous/anxious.      Constitutional, HEENT, cardiovascular, pulmonary, gi and gu systems are negative, except as otherwise noted.    OBJECTIVE:   BP (!) 174/60 (BP Location: Right arm, Patient Position: Sitting, Cuff Size: Adult Regular)   Pulse 110   Temp 98.4  F (36.9  C) (Oral)   Resp 16   Ht 5' 2\" (1.575 m)   Wt 138 lb 14.4 oz (63 kg)   LMP  (LMP Unknown)   SpO2 93%   BMI 25.41 kg/m   Estimated body mass index is " "25.41 kg/m  as calculated from the following:    Height as of this encounter: 5' 2\" (1.575 m).    Weight as of this encounter: 138 lb 14.4 oz (63 kg).  Physical Exam  GENERAL: healthy, alert and no distress  NECK: no adenopathy, no asymmetry, masses, or scars and thyroid normal to palpation  RESP: lungs clear to auscultation - no rales, rhonchi or wheezes  CV: regular rate and rhythm, normal S1 S2, no S3 or S4, no murmur, click or rub, no peripheral edema and peripheral pulses strong  ABDOMEN: soft, nontender, no hepatosplenomegaly, no masses and bowel sounds normal  MS: no gross musculoskeletal defects noted, no edema  PSYCH: mentation appears normal, affect normal/bright        ASSESSMENT / PLAN:         (Z00.00) Healthcare maintenance  (primary encounter diagnosis)  Comment:   Plan:     (E03.9) Subclinical hypothyroidism  Comment:   Plan: TSH with free T4 reflex            (E78.5) Hyperlipidemia LDL goal <130  Comment:   Plan: Lipid Profile            (I10) Benign hypertension  Comment:   Plan: CBC with platelets, Comprehensive metabolic         panel            (Z78.0) Asymptomatic menopausal state  Comment:   Plan: DX Hip/Pelvis/Spine              COUNSELING:  Reviewed preventive health counseling, as reflected in patient instructions    Estimated body mass index is 25.41 kg/m  as calculated from the following:    Height as of this encounter: 5' 2\" (1.575 m).    Weight as of this encounter: 138 lb 14.4 oz (63 kg).         reports that she has never smoked. She has never used smokeless tobacco.      Appropriate preventive services were discussed with this patient, including applicable screening as appropriate for cardiovascular disease, diabetes, osteopenia/osteoporosis, and glaucoma.  As appropriate for age/gender, discussed screening for colorectal cancer, prostate cancer, breast cancer, and cervical cancer. Checklist reviewing preventive services available has been given to the patient.    Reviewed patients " plan of care and provided an AVS. The Basic Care Plan (routine screening as documented in Health Maintenance) for Vicky meets the Care Plan requirement. This Care Plan has been established and reviewed with the Patient.    Counseling Resources:  ATP IV Guidelines  Pooled Cohorts Equation Calculator  Breast Cancer Risk Calculator  FRAX Risk Assessment  ICSI Preventive Guidelines  Dietary Guidelines for Americans, 2010  [employer]'s MyPlate  ASA Prophylaxis  Lung CA Screening    Camille De Leon NP  [facility]    Identified Health Risks:

## 2019-12-11 LAB
ALBUMIN SERPL-MCNC: 3.7 G/DL (ref 3.4–5)
ALP SERPL-CCNC: 80 U/L (ref 40–150)
ALT SERPL W P-5'-P-CCNC: 24 U/L (ref 0–50)
ANION GAP SERPL CALCULATED.3IONS-SCNC: 6 MMOL/L (ref 3–14)
AST SERPL W P-5'-P-CCNC: 19 U/L (ref 0–45)
BILIRUB SERPL-MCNC: 0.6 MG/DL (ref 0.2–1.3)
BUN SERPL-MCNC: 13 MG/DL (ref 7–30)
CALCIUM SERPL-MCNC: 8.8 MG/DL (ref 8.5–10.1)
CHLORIDE SERPL-SCNC: 105 MMOL/L (ref 94–109)
CHOLEST SERPL-MCNC: 168 MG/DL
CO2 SERPL-SCNC: 27 MMOL/L (ref 20–32)
CREAT SERPL-MCNC: 0.58 MG/DL (ref 0.52–1.04)
GFR SERPL CREATININE-BSD FRML MDRD: 84 ML/MIN/{1.73_M2}
GLUCOSE SERPL-MCNC: 111 MG/DL (ref 70–99)
HDLC SERPL-MCNC: 45 MG/DL
LDLC SERPL CALC-MCNC: 61 MG/DL
NONHDLC SERPL-MCNC: 123 MG/DL
POTASSIUM SERPL-SCNC: 3.9 MMOL/L (ref 3.4–5.3)
PROT SERPL-MCNC: 7.3 G/DL (ref 6.8–8.8)
SODIUM SERPL-SCNC: 138 MMOL/L (ref 133–144)
TRIGL SERPL-MCNC: 309 MG/DL
TSH SERPL DL<=0.005 MIU/L-ACNC: 2.22 MU/L (ref 0.4–4)

## 2020-02-14 DIAGNOSIS — I10 BENIGN HYPERTENSION: ICD-10-CM

## 2020-02-14 RX ORDER — LOSARTAN POTASSIUM AND HYDROCHLOROTHIAZIDE 12.5; 5 MG/1; MG/1
TABLET ORAL
Qty: 90 TABLET | Refills: 0 | Status: SHIPPED | OUTPATIENT
Start: 2020-02-14 | End: 2020-05-14

## 2020-02-14 RX ORDER — METOPROLOL SUCCINATE 50 MG/1
TABLET, EXTENDED RELEASE ORAL
Qty: 90 TABLET | Refills: 0 | Status: SHIPPED | OUTPATIENT
Start: 2020-02-14 | End: 2020-05-14

## 2020-02-14 RX ORDER — AMLODIPINE BESYLATE 5 MG/1
TABLET ORAL
Qty: 90 TABLET | Refills: 4 | Status: SHIPPED | OUTPATIENT
Start: 2020-02-14 | End: 2021-05-07

## 2020-02-14 NOTE — TELEPHONE ENCOUNTER
"Requested Prescriptions   Pending Prescriptions Disp Refills     amLODIPine (NORVASC) 5 MG tablet [Pharmacy Med Name: AMLODIPINE BESYLATE 5MG TABLETS] 90 tablet 4     Sig: TAKE 1 TABLET BY MOUTH DAILY   Last Written Prescription Date:  11/28/2018  Last Fill Quantity: 90,  # refills: 04   Last office visit: No previous visit found with prescribing provider:     Future Office Visit:      Calcium Channel Blockers Protocol  Failed - 2/14/2020  5:42 AM        Failed - Blood pressure under 140/90 in past 12 months     BP Readings from Last 3 Encounters:   12/10/19 (!) 174/60   11/28/18 128/82   09/26/18 150/70                 Passed - Recent (12 mo) or future (30 days) visit within the authorizing provider's specialty     Patient has had an office visit with the authorizing provider or a provider within the authorizing providers department within the previous 12 mos or has a future within next 30 days. See \"Patient Info\" tab in inbasket, or \"Choose Columns\" in Meds & Orders section of the refill encounter.              Passed - Medication is active on med list        Passed - Patient is age 18 or older        Passed - No active pregnancy on record        Passed - Normal serum creatinine on file in past 12 months     Recent Labs   Lab Test 12/10/19  0904   CR 0.58             Passed - No positive pregnancy test in past 12 months        metoprolol succinate ER (TOPROL-XL) 50 MG 24 hr tablet [Pharmacy Med Name: METOPROLOL ER SUCCINATE 50MG TABS] 90 tablet 0     Sig: TAKE 1 TABLET BY MOUTH EVERY DAY   Last Written Prescription Date:  11/21/2019  Last Fill Quantity: 90,  # refills: 0   Last office visit: No previous visit found with prescribing provider:     Future Office Visit:      Beta-Blockers Protocol Failed - 2/14/2020  5:42 AM        Failed - Blood pressure under 140/90 in past 12 months     BP Readings from Last 3 Encounters:   12/10/19 (!) 174/60   11/28/18 128/82   09/26/18 150/70                 Passed - Patient is " "age 6 or older        Passed - Recent (12 mo) or future (30 days) visit within the authorizing provider's specialty     Patient has had an office visit with the authorizing provider or a provider within the authorizing providers department within the previous 12 mos or has a future within next 30 days. See \"Patient Info\" tab in inbasket, or \"Choose Columns\" in Meds & Orders section of the refill encounter.              Passed - Medication is active on med list        losartan-hydrochlorothiazide (HYZAAR) 50-12.5 MG tablet [Pharmacy Med Name: LOSARTAN/HCTZ 50/12.5MG TABLETS] 90 tablet 0     Sig: TAKE 1 TABLET BY MOUTH EVERY DAY   Last Written Prescription Date:  11/18/2019  Last Fill Quantity: 90,  # refills: 0   Last office visit: No previous visit found with prescribing provider:     Future Office Visit:      Angiotensin-II Receptors Failed - 2/14/2020  5:42 AM        Failed - Last blood pressure under 140/90 in past 12 months     BP Readings from Last 3 Encounters:   12/10/19 (!) 174/60   11/28/18 128/82   09/26/18 150/70                 Passed - Recent (12 mo) or future (30 days) visit within the authorizing provider's specialty     Patient has had an office visit with the authorizing provider or a provider within the authorizing providers department within the previous 12 mos or has a future within next 30 days. See \"Patient Info\" tab in inbasket, or \"Choose Columns\" in Meds & Orders section of the refill encounter.              Passed - Medication is active on med list        Passed - Patient is age 18 or older        Passed - No active pregnancy on record        Passed - Normal serum creatinine on file in past 12 months     Recent Labs   Lab Test 12/10/19  0904   CR 0.58             Passed - Normal serum potassium on file in past 12 months     Recent Labs   Lab Test 12/10/19  0904   POTASSIUM 3.9                    Passed - No positive pregnancy test in past 12 months        "

## 2020-02-14 NOTE — TELEPHONE ENCOUNTER
"Requested Prescriptions   Pending Prescriptions Disp Refills     amLODIPine (NORVASC) 5 MG tablet [Pharmacy Med Name: AMLODIPINE BESYLATE 5MG TABLETS] 90 tablet 4     Sig: TAKE 1 TABLET BY MOUTH DAILY       Calcium Channel Blockers Protocol  Failed - 2/14/2020  2:46 PM        Failed - Blood pressure under 140/90 in past 12 months     BP Readings from Last 3 Encounters:   12/10/19 (!) 174/60   11/28/18 128/82   09/26/18 150/70                 Passed - Recent (12 mo) or future (30 days) visit within the authorizing provider's specialty     Patient has had an office visit with the authorizing provider or a provider within the authorizing providers department within the previous 12 mos or has a future within next 30 days. See \"Patient Info\" tab in inbasket, or \"Choose Columns\" in Meds & Orders section of the refill encounter.              Passed - Medication is active on med list        Passed - Patient is age 18 or older        Passed - No active pregnancy on record        Passed - Normal serum creatinine on file in past 12 months     Recent Labs   Lab Test 12/10/19  0904   CR 0.58             Passed - No positive pregnancy test in past 12 months        metoprolol succinate ER (TOPROL-XL) 50 MG 24 hr tablet [Pharmacy Med Name: METOPROLOL ER SUCCINATE 50MG TABS] 90 tablet 0     Sig: TAKE 1 TABLET BY MOUTH EVERY DAY       Beta-Blockers Protocol Failed - 2/14/2020  2:46 PM        Failed - Blood pressure under 140/90 in past 12 months     BP Readings from Last 3 Encounters:   12/10/19 (!) 174/60   11/28/18 128/82   09/26/18 150/70                 Passed - Patient is age 6 or older        Passed - Recent (12 mo) or future (30 days) visit within the authorizing provider's specialty     Patient has had an office visit with the authorizing provider or a provider within the authorizing providers department within the previous 12 mos or has a future within next 30 days. See \"Patient Info\" tab in inbasket, or \"Choose Columns\" in " "Meds & Orders section of the refill encounter.              Passed - Medication is active on med list        losartan-hydrochlorothiazide (HYZAAR) 50-12.5 MG tablet [Pharmacy Med Name: LOSARTAN/HCTZ 50/12.5MG TABLETS] 90 tablet 0     Sig: TAKE 1 TABLET BY MOUTH EVERY DAY       Angiotensin-II Receptors Failed - 2/14/2020  2:46 PM        Failed - Last blood pressure under 140/90 in past 12 months     BP Readings from Last 3 Encounters:   12/10/19 (!) 174/60   11/28/18 128/82   09/26/18 150/70                 Passed - Recent (12 mo) or future (30 days) visit within the authorizing provider's specialty     Patient has had an office visit with the authorizing provider or a provider within the authorizing providers department within the previous 12 mos or has a future within next 30 days. See \"Patient Info\" tab in inbasket, or \"Choose Columns\" in Meds & Orders section of the refill encounter.              Passed - Medication is active on med list        Passed - Patient is age 18 or older        Passed - No active pregnancy on record        Passed - Normal serum creatinine on file in past 12 months     Recent Labs   Lab Test 12/10/19  0904   CR 0.58             Passed - Normal serum potassium on file in past 12 months     Recent Labs   Lab Test 12/10/19  0904   POTASSIUM 3.9                    Passed - No positive pregnancy test in past 12 months        Routing refill request to provider for review/approval because:  blood pressure outside of parameters          "

## 2020-02-22 ENCOUNTER — HOSPITAL ENCOUNTER (EMERGENCY)
Facility: CLINIC | Age: 85
Discharge: HOME | End: 2020-02-22
Attending: EMERGENCY MEDICINE | Admitting: EMERGENCY MEDICINE
Payer: COMMERCIAL

## 2020-02-22 VITALS
DIASTOLIC BLOOD PRESSURE: 69 MMHG | OXYGEN SATURATION: 94 % | TEMPERATURE: 98.4 F | RESPIRATION RATE: 19 BRPM | SYSTOLIC BLOOD PRESSURE: 158 MMHG | HEART RATE: 80 BPM

## 2020-02-22 DIAGNOSIS — E87.6 HYPOKALEMIA: ICD-10-CM

## 2020-02-22 DIAGNOSIS — R00.2 PALPITATIONS: ICD-10-CM

## 2020-02-22 LAB
ANION GAP SERPL CALCULATED.3IONS-SCNC: 6 MMOL/L (ref 3–14)
BASOPHILS # BLD AUTO: 0.1 10E9/L (ref 0–0.2)
BASOPHILS NFR BLD AUTO: 0.7 %
BUN SERPL-MCNC: 17 MG/DL (ref 7–30)
CALCIUM SERPL-MCNC: 9.1 MG/DL (ref 8.5–10.1)
CHLORIDE SERPL-SCNC: 108 MMOL/L (ref 94–109)
CO2 SERPL-SCNC: 27 MMOL/L (ref 20–32)
CREAT SERPL-MCNC: 0.6 MG/DL (ref 0.52–1.04)
DIFFERENTIAL METHOD BLD: NORMAL
EOSINOPHIL # BLD AUTO: 0.2 10E9/L (ref 0–0.7)
EOSINOPHIL NFR BLD AUTO: 1.9 %
ERYTHROCYTE [DISTWIDTH] IN BLOOD BY AUTOMATED COUNT: 12.1 % (ref 10–15)
GFR SERPL CREATININE-BSD FRML MDRD: 83 ML/MIN/{1.73_M2}
GLUCOSE SERPL-MCNC: 138 MG/DL (ref 70–99)
HCT VFR BLD AUTO: 43.7 % (ref 35–47)
HGB BLD-MCNC: 14.4 G/DL (ref 11.7–15.7)
IMM GRANULOCYTES # BLD: 0.1 10E9/L (ref 0–0.4)
IMM GRANULOCYTES NFR BLD: 0.8 %
LYMPHOCYTES # BLD AUTO: 2.1 10E9/L (ref 0.8–5.3)
LYMPHOCYTES NFR BLD AUTO: 21.6 %
MCH RBC QN AUTO: 31.3 PG (ref 26.5–33)
MCHC RBC AUTO-ENTMCNC: 33 G/DL (ref 31.5–36.5)
MCV RBC AUTO: 95 FL (ref 78–100)
MONOCYTES # BLD AUTO: 0.6 10E9/L (ref 0–1.3)
MONOCYTES NFR BLD AUTO: 6.6 %
NEUTROPHILS # BLD AUTO: 6.6 10E9/L (ref 1.6–8.3)
NEUTROPHILS NFR BLD AUTO: 68.4 %
NRBC # BLD AUTO: 0 10*3/UL
NRBC BLD AUTO-RTO: 0 /100
PLATELET # BLD AUTO: 337 10E9/L (ref 150–450)
POTASSIUM SERPL-SCNC: 3.3 MMOL/L (ref 3.4–5.3)
RBC # BLD AUTO: 4.6 10E12/L (ref 3.8–5.2)
SODIUM SERPL-SCNC: 141 MMOL/L (ref 133–144)
TROPONIN I SERPL-MCNC: <0.015 UG/L (ref 0–0.04)
TSH SERPL DL<=0.005 MIU/L-ACNC: 3 MU/L (ref 0.4–4)
WBC # BLD AUTO: 9.7 10E9/L (ref 4–11)

## 2020-02-22 PROCEDURE — 84484 ASSAY OF TROPONIN QUANT: CPT | Performed by: EMERGENCY MEDICINE

## 2020-02-22 PROCEDURE — 84443 ASSAY THYROID STIM HORMONE: CPT | Performed by: EMERGENCY MEDICINE

## 2020-02-22 PROCEDURE — 80048 BASIC METABOLIC PNL TOTAL CA: CPT | Performed by: EMERGENCY MEDICINE

## 2020-02-22 PROCEDURE — 99284 EMERGENCY DEPT VISIT MOD MDM: CPT

## 2020-02-22 PROCEDURE — 93005 ELECTROCARDIOGRAM TRACING: CPT

## 2020-02-22 PROCEDURE — 85025 COMPLETE CBC W/AUTO DIFF WBC: CPT | Performed by: EMERGENCY MEDICINE

## 2020-02-22 PROCEDURE — 25000132 ZZH RX MED GY IP 250 OP 250 PS 637: Performed by: PHYSICIAN ASSISTANT

## 2020-02-22 RX ORDER — POTASSIUM CHLORIDE 1.5 G/1.58G
20 POWDER, FOR SOLUTION ORAL ONCE
Status: COMPLETED | OUTPATIENT
Start: 2020-02-22 | End: 2020-02-22

## 2020-02-22 RX ADMIN — POTASSIUM CHLORIDE 20 MEQ: 1.5 POWDER, FOR SOLUTION ORAL at 11:10

## 2020-02-22 ASSESSMENT — ENCOUNTER SYMPTOMS
ABDOMINAL PAIN: 0
COUGH: 0
SHORTNESS OF BREATH: 0
VOMITING: 0
DIAPHORESIS: 0
CHILLS: 0
PALPITATIONS: 1
NAUSEA: 0

## 2020-02-22 NOTE — ED PROVIDER NOTES
Emergency Department Attending Supervision Note  2/22/2020  10:24 AM      I evaluated this patient in conjunction with Lilia Bello PA-C      Briefly, the patient presented with palpitations.  She has been having palpitations described as a racing sensation over night for the past several nights.  Asymptomatic at time of ED presentation.  No recent med changes.      Exam:  CV: RRR  Pulm; CTAB    MDM and Plan:  Palpitations.  No arrythmia here.  Very mild hypokalemia.  Replaced here.  Prior labs do not show consistent hypokalemia therefore will just follow up with PCP to ensure not persistently low.  Ziopatch.      Diagnosis    ICD-10-CM    1. Palpitations R00.2 Zio Patch Holter Adult Pediatric Greater than 48 hrs   2. Hypokalemia E87.6               Julia Ho MD  02/22/20 1104

## 2020-02-22 NOTE — ED AVS SNAPSHOT
[facility] Emergency Department  [address]  [facility] [address]  Phone:  [phone]  Fax:  [phone]                                    Vicky Kang   MRN: [id number]    Department:  [facility] Emergency Department   Date of Visit:  2/22/2020           After Visit Summary Signature Page    I have received my discharge instructions, and my questions have been answered. I have discussed any challenges I see with this plan with the nurse or doctor.    ..........................................................................................................................................  Patient/Patient Representative Signature      ..........................................................................................................................................  Patient Representative Print Name and Relationship to Patient    ..................................................               ................................................  Date                                   Time    ..........................................................................................................................................  Reviewed by Signature/Title    ...................................................              ..............................................  Date                                               Time          22EPIC Rev 08/18

## 2020-02-22 NOTE — ED PROVIDER NOTES
History     Chief Complaint:  Palpitations      HPI   Vicky Kang is a 85 year old female with history of HTN, hyperlipidemia, neuropathy, thyroid nodule, and subclinical hypothyroidism who presents for the evaluation of palpitations. The patient states that's she experienced palpitations 5-6 days ago while laying in bed. She states that she has noticed her symptoms every night when she lays down but denies experiencing symptoms during the day. The patient reports that she is currently taking amlodipine, metoprolol, and lisinopril as prescribed. She denied any recent medication changes. At the time of the exam, the patient reports being asymptomatic. She denied fever, chills, chest pain, SOB, diaphoresis, abdominal pain, N/V, or any other medical concerns.     Allergies:  No Known Allergies     Medications:    amLODIPine (NORVASC) 5 MG tablet  losartan-hydrochlorothiazide (HYZAAR) 50-12.5 MG tablet  metoprolol succinate ER (TOPROL-XL) 50 MG 24 hr tablet  simvastatin (ZOCOR) 20 MG tablet      Problem List:    Patient Active Problem List    Diagnosis Date Noted     Abnormal finding on thyroid function test 11/10/2015     Priority: Medium     Thyroid nodule 11/10/2015     Priority: Medium     Subclinical hypothyroidism 11/10/2015     Priority: Medium     Advanced directives, counseling/discussion 04/24/2013     Priority: Medium     Patient states has Advance Directive and will bring in a copy to clinic.       Benign hypertension 04/24/2013     Priority: Medium     Hyperlipidemia LDL goal <130 04/24/2013     Priority: Medium     Neuropathy 04/24/2013     Priority: Medium     Preventative health care 04/24/2013     Priority: Medium     Osteopenia 04/24/2013     Priority: Medium        Past Medical History:    Past Medical History:   Diagnosis Date     Abnormal finding on thyroid function test 11/10/2015     Hyperlipidemia      Hypertension      Neuropathy      Subclinical hypothyroidism 11/10/2015     Thyroid nodule  11/10/2015       Past Surgical History:    Past Surgical History:   Procedure Laterality Date     CATARACT IOL, RT/LT       ENT SURGERY   Spring 2017    throat cyst removal     PHACOEMULSIFICATION CLEAR CORNEA WITH STANDARD INTRAOCULAR LENS IMPLANT  1/5/2012    Procedure:PHACOEMULSIFICATION CLEAR CORNEA WITH STANDARD INTRAOCULAR LENS IMPLANT; LEFT PHACOEMULSIFICATION CLEAR CORNEA WITH STANDARD LENS IMPLANT ; Surgeon:TUNDE GARDNER; Location:[facility]     VITRECTOMY ANTERIOR         Family History:    Family History   Problem Relation Age of Onset     Cancer Mother      Family History Negative Father        Social History:  Marital Status:   [5]  Social History     Tobacco Use     Smoking status: Never Smoker     Smokeless tobacco: Never Used   Substance Use Topics     Alcohol use: No     Alcohol/week: 0.0 standard drinks     Drug use: No        Review of Systems   Constitutional: Negative for chills and diaphoresis.   Respiratory: Negative for cough and shortness of breath.    Cardiovascular: Positive for palpitations. Negative for chest pain and leg swelling.   Gastrointestinal: Negative for abdominal pain, nausea and vomiting.   All other systems reviewed and are negative.    Physical Exam   First Vitals:  BP: (!) 181/78  Pulse: 98  Heart Rate: 92  Temp: 98.4  F (36.9  C)  Resp: 18  SpO2: 97 %      Physical Exam  Vitals signs and nursing note reviewed.   HENT:      Nose: Nose normal. No congestion or rhinorrhea.      Mouth/Throat:      Mouth: Mucous membranes are moist.      Pharynx: Oropharynx is clear.   Eyes:      Extraocular Movements: Extraocular movements intact.      Conjunctiva/sclera: Conjunctivae normal.   Cardiovascular:      Rate and Rhythm: Normal rate and regular rhythm.      Pulses: Normal pulses.      Heart sounds: Normal heart sounds.   Pulmonary:      Effort: Pulmonary effort is normal.      Breath sounds: Normal breath sounds.   Abdominal:      General: Abdomen is flat. Bowel sounds are  normal.      Palpations: Abdomen is soft.      Tenderness: There is no abdominal tenderness.   Musculoskeletal: Normal range of motion.      Right lower leg: No edema.      Left lower leg: No edema.   Skin:     General: Skin is warm and dry.   Neurological:      Mental Status: She is alert and oriented to person, place, and time.   Psychiatric:         Mood and Affect: Mood normal.         Behavior: Behavior normal.       Emergency Department Course     ECG (2/22/20):  Rate 92 bpm. MT interval 154. QRS duration 88. QT/QTc 376/464. P-R-T axes 58 12 32.  Interpreted at 10:36 AM by Dr. Julia Ho      Imaging:  None.     Laboratory:  Labs Ordered and Resulted from Time of ED Arrival Up to the Time of Departure from the ED   BASIC METABOLIC PANEL - Abnormal; Notable for the following components:       Result Value    Potassium 3.3 (*)     Glucose 138 (*)     All other components within normal limits   CBC WITH PLATELETS DIFFERENTIAL   TROPONIN I   TSH   PERIPHERAL IV CATHETER         Procedures:  None.           Interventions:  Medications   potassium chloride (KLOR-CON) Packet 20 mEq (20 mEq Oral Given 2/22/20 1110)     Emergency Department Course:  Nursing notes and vitals reviewed.  10:10 AM: I performed an exam of the patient as documented above.      IV was inserted and blood was drawn for laboratory testing, results above.    11:00 AM:  Patient re-evaluated. I discussed the results of her work-up and the plan for discharge. I personally reviewed the  laboratory results with the patient and her daughter-in-law and answered all related questions.I discussed the treatment plan with the patient and her daughter-in-law. They expressed understanding of this plan and consented to discharge. They will be discharged home with instructions for care and follow up. In addition, the patient will return to the emergency department if their symptoms persist, worsen, if new symptoms arise or if there is any concern.   All questions were answered.         Impression & Plan           Medical Decision Making:  Vicky Kang is a 85 year old female who presents for evaluation of palpitations. Vitals were reviewed.  Initial ECG shows normal sinus rhythm.  A broad differential diagnosis was considered including SVT, Atrial fibrillation, ventricular arrhythmia, thyroid disease, acute electrolyte abnormality,  drugs/medications, caffeine intake or other stimulants, medication side effect, anemia, heart disease, PE, ACS etc.  Labs were obtained with results outlined above. The patient received potassium replacment while in the ED. A Zio patch order was placed. Results and clinical impression were discussed with the patient. She remianed hemodynamically stable with no recurrent of her symptoms while in the ED. The workup and exam here in ED would indicate that the patient is safe for discharge home. The patient was encouraged to follow-up with her PCP at the end of next week to re-check her potassium and to review the results of her Zio Patch. She was encouraged to return to the ED if she experiences chest pain, SOB, dizziness, lightheadedness, or any other medical concerns. All her questions and concerns were addressed prior to discharge.     Diagnosis:    ICD-10-CM    1. Palpitations R00.2 Zio Patch Holter Adult Pediatric Greater than 48 hrs   2. Hypokalemia E87.6        Disposition:  discharged to home    Discharge Medications:  New Prescriptions    No medications on file       Lilia Bello PA-C  2/22/2020   [facility] EMERGENCY DEPARTMENT       Lilia Bello PA-C  02/22/20 1114

## 2020-02-22 NOTE — DISCHARGE INSTRUCTIONS
We did not find a cause for your presenting symptoms. Your potassium was slightly low but we do not feel that this is contributing to your symptoms.   We have prescribed a Zio Patch to continue evaluating your symptoms. The team from [facility] should call you Monday to schedule an appointment to get your patch.   Please follow-up with you primary care provider at the end of next week have your potassium re-checked and to follow-up on the results from your Zio Patch.   Please return to the ED if you experience chest pain, SOB, lightheadedness/dizziness, or any other medical concerns.

## 2020-02-22 NOTE — ED TRIAGE NOTES
Pt A&Ox4. Pt reports heart fluttering sensation for 5 days. Pt reports usually happening at night when tries to sleep.

## 2020-02-23 LAB — INTERPRETATION ECG - MUSE: NORMAL

## 2020-02-25 ENCOUNTER — HOSPITAL ENCOUNTER (OUTPATIENT)
Dept: CARDIOLOGY | Facility: CLINIC | Age: 85
Discharge: HOME | End: 2020-02-25
Attending: PHYSICIAN ASSISTANT | Admitting: PHYSICIAN ASSISTANT
Payer: COMMERCIAL

## 2020-02-25 DIAGNOSIS — R00.2 PALPITATIONS: ICD-10-CM

## 2020-02-25 PROCEDURE — 0296T ZIO PATCH HOLTER ADULT PEDIATRIC GREATER THAN 48 HRS: CPT

## 2020-02-25 PROCEDURE — 0298T ZIO PATCH HOLTER ADULT PEDIATRIC GREATER THAN 48 HRS: CPT | Performed by: INTERNAL MEDICINE

## 2020-02-28 DIAGNOSIS — E78.5 HYPERLIPIDEMIA LDL GOAL <130: ICD-10-CM

## 2020-02-28 NOTE — TELEPHONE ENCOUNTER
"Requested Prescriptions   Pending Prescriptions Disp Refills     simvastatin (ZOCOR) 20 MG tablet  Last Written Prescription Date:  11/28/8  Last Fill Quantity: 90,  # refills: 4   Last office visit: 12/10/2019 with prescribing provider:  12/10/19   Future Office Visit:     90 tablet 4     Sig: TAKE ONE TABLET BY MOUTH ONCE DAILY AT BEDTIME       Statins Protocol Passed - 2/28/2020  3:51 PM        Passed - LDL on file in past 12 months     Recent Labs   Lab Test 12/10/19  0904   LDL 61             Passed - No abnormal creatine kinase in past 12 months     No lab results found.             Passed - Recent (12 mo) or future (30 days) visit within the authorizing provider's specialty     Patient has had an office visit with the authorizing provider or a provider within the authorizing providers department within the previous 12 mos or has a future within next 30 days. See \"Patient Info\" tab in inbasket, or \"Choose Columns\" in Meds & Orders section of the refill encounter.              Passed - Medication is active on med list        Passed - Patient is age 18 or older        Passed - No active pregnancy on record        Passed - No positive pregnancy test in past 12 months          "

## 2020-03-02 RX ORDER — SIMVASTATIN 20 MG
TABLET ORAL
Qty: 90 TABLET | Refills: 2 | Status: SHIPPED | OUTPATIENT
Start: 2020-03-02 | End: 2020-11-11

## 2020-03-02 NOTE — TELEPHONE ENCOUNTER
"Per result note 12-10-19 from Camille De Leon:  \"Please continue your current cholesterol medication and try to reduce any fat in your diet.\"    Prescription approved per [facility] Refill Protocol.      "

## 2020-03-10 ENCOUNTER — OFFICE VISIT (OUTPATIENT)
Dept: INTERNAL MEDICINE | Facility: CLINIC | Age: 85
End: 2020-03-10
Payer: COMMERCIAL

## 2020-03-10 VITALS
HEIGHT: 62 IN | BODY MASS INDEX: 25.21 KG/M2 | OXYGEN SATURATION: 96 % | TEMPERATURE: 98.1 F | RESPIRATION RATE: 12 BRPM | HEART RATE: 81 BPM | DIASTOLIC BLOOD PRESSURE: 70 MMHG | WEIGHT: 137 LBS | SYSTOLIC BLOOD PRESSURE: 120 MMHG

## 2020-03-10 DIAGNOSIS — F51.01 PRIMARY INSOMNIA: ICD-10-CM

## 2020-03-10 DIAGNOSIS — R00.2 PALPITATIONS: ICD-10-CM

## 2020-03-10 DIAGNOSIS — E87.6 HYPOKALEMIA: Primary | ICD-10-CM

## 2020-03-10 PROCEDURE — 99214 OFFICE O/P EST MOD 30 MIN: CPT | Performed by: INTERNAL MEDICINE

## 2020-03-10 PROCEDURE — 83735 ASSAY OF MAGNESIUM: CPT | Performed by: INTERNAL MEDICINE

## 2020-03-10 PROCEDURE — 36415 COLL VENOUS BLD VENIPUNCTURE: CPT | Performed by: INTERNAL MEDICINE

## 2020-03-10 PROCEDURE — 80048 BASIC METABOLIC PNL TOTAL CA: CPT | Performed by: INTERNAL MEDICINE

## 2020-03-10 ASSESSMENT — MIFFLIN-ST. JEOR: SCORE: 1019.68

## 2020-03-10 NOTE — LETTER
March 12, 2020      Vicky MARYAN Pearl  [address] [address] [address]        Dear ,    We are writing to inform you of your test results.    The electrolytes (sodium and potassium) are normal.   The kidney function (GFR) is within normal limits.   The magnesium is within normal limits.     Resulted Orders   Basic metabolic panel  (Ca, Cl, CO2, Creat, Gluc, K, Na, BUN)   Result Value Ref Range    Sodium 141 133 - 144 mmol/L    Potassium 4.3 3.4 - 5.3 mmol/L    Chloride 105 94 - 109 mmol/L    Carbon Dioxide 27 20 - 32 mmol/L    Anion Gap 9 3 - 14 mmol/L    Glucose 108 (H) 70 - 99 mg/dL      Comment:      Non Fasting    Urea Nitrogen 15 7 - 30 mg/dL    Creatinine 0.62 0.52 - 1.04 mg/dL    GFR Estimate 82 >60 mL/min/[1.73_m2]      Comment:      Non  GFR Calc  Starting 12/18/2018, serum creatinine based estimated GFR (eGFR) will be   calculated using the Chronic Kidney Disease Epidemiology Collaboration   (CKD-EPI) equation.      GFR Estimate If Black >90 >60 mL/min/[1.73_m2]      Comment:       GFR Calc  Starting 12/18/2018, serum creatinine based estimated GFR (eGFR) will be   calculated using the Chronic Kidney Disease Epidemiology Collaboration   (CKD-EPI) equation.      Calcium 9.2 8.5 - 10.1 mg/dL   Magnesium   Result Value Ref Range    Magnesium 2.2 1.6 - 2.3 mg/dL       If you have any questions or concerns, please call the clinic at the number listed above.       Sincerely,        Claire Gross MD

## 2020-03-10 NOTE — PATIENT INSTRUCTIONS
Labs today    Call regarding sleep psychology  Do not use benadryl    Walk 20 minutes daily      Cardiology- if no improvement

## 2020-03-10 NOTE — NURSING NOTE
"/70   Pulse 81   Temp 98.1  F (36.7  C) (Oral)   Resp 12   Ht 1.575 m (5' 2\")   Wt 62.1 kg (137 lb)   LMP  (LMP Unknown)   SpO2 96%   Breastfeeding No   BMI 25.06 kg/m      "

## 2020-03-10 NOTE — PROGRESS NOTES
Subjective     Vicky aKng is a 85 year old female who presents to clinic today for the following health issues:    HPI   ED/UC Followup:    Facility:  [facility]  Date of visit: 2/22/20  Reason for visit: palpitations  Current Status: same/still feeling palpitations and heavy pounding sensation     Patient presented to ED on 2/22/2020 for palpitations. She was having palpitations 5 nights prior to visit and lasted up to 15 min. EKG showed she has a border line QT interval. Troponin was negative. Thyroid levels were normal. Potassium levels were low, and she received potassium replacement. She was sent home with a ziopatch monitor, which showed rare PVC's and PAC's.     Palpitations continue nightly. Patient does not drink caffeine. Notes that she had recently started taking Tylenol PM and/or Aleve at night to help her sleep. She does not feel anxious during the day and overall feels as if it is well controlled. She feels anxious only at night.    Insomnia  She was taking benadryl nightly to help her sleep. She's tried melatonin in the past but it made her feel excessively groggy. She has a hard time falling asleep due to thoughts and worries.         Patient Active Problem List   Diagnosis     Advanced directives, counseling/discussion     Benign hypertension     Hyperlipidemia LDL goal <130     Neuropathy     Preventative health care     Osteopenia     Abnormal finding on thyroid function test     Thyroid nodule     Subclinical hypothyroidism     Past Surgical History:   Procedure Laterality Date     CATARACT IOL, RT/LT       ENT SURGERY   Spring 2017    throat cyst removal     PHACOEMULSIFICATION CLEAR CORNEA WITH STANDARD INTRAOCULAR LENS IMPLANT  1/5/2012    Procedure:PHACOEMULSIFICATION CLEAR CORNEA WITH STANDARD INTRAOCULAR LENS IMPLANT; LEFT PHACOEMULSIFICATION CLEAR CORNEA WITH STANDARD LENS IMPLANT ; Surgeon:TUNDE GARDNER; Location: [facility]     VITRECTOMY ANTERIOR         Social History     Tobacco Use      "Smoking status: Never Smoker     Smokeless tobacco: Never Used   Substance Use Topics     Alcohol use: No     Alcohol/week: 0.0 standard drinks     Family History   Problem Relation Age of Onset     Cancer Mother      Family History Negative Father            Reviewed and updated as needed this visit by Provider  Meds  Problems         Review of Systems   ROS COMP: CONSTITUTIONAL: NEGATIVE for fever, chills, change in weight  ENT/MOUTH: NEGATIVE for ear, mouth and throat problems  RESP: NEGATIVE for significant cough or SOB  CV: NEGATIVE for chest pain or peripheral edema POSITIVE for palpitations   PSYCHIATRIC: NEGATIVE for changes in mood or affect and POSITIVE for insomnia     This document serves as a record of the services and decisions personally performed and made by Claire Gross MD. It was created on their behalf by Dionna Gaspar, a trained medical scribe. The creation of this document is based on the provider's statements to the medical scribe.  Dionna Gaspar March 10, 2020 1:15 PM        Objective    /70   Pulse 81   Temp 98.1  F (36.7  C) (Oral)   Resp 12   Ht 1.575 m (5' 2\")   Wt 62.1 kg (137 lb)   LMP  (LMP Unknown)   SpO2 96%   Breastfeeding No   BMI 25.06 kg/m    Body mass index is 25.06 kg/m .     Physical Exam   GENERAL: healthy, alert and no distress  RESP: lungs clear to auscultation - no rales, rhonchi or wheezes  CV: regular rate and rhythm, 2/6 BEBETO, click or rub, no peripheral edema and peripheral pulses strong  MS: no gross musculoskeletal defects noted, no edema  SKIN: no suspicious lesions or rashes  NEURO: Normal strength and tone, mentation intact and speech normal  PSYCH: mentation appears normal, affect normal/bright    Diagnostic Test Results:  Labs reviewed in Epic  No results found for this or any previous visit (from the past 24 hour(s)).        Assessment & Plan     (E87.6) Hypokalemia  (primary encounter diagnosis)  Comment: update labs  Plan: Basic metabolic " panel  (Ca, Cl, CO2, Creat,         Gluc, K, Na, BUN), Magnesium            (R00.2) Palpitations  Comment:  Ongoing  Plan: CARDIOLOGY EVAL ADULT REFERRAL, Echocardiogram         Complete  Advised pt to avoid benadryl use  Follow up with cardiology if palpitations are not improving  Schedule echocardiogram    (F51.01) Primary insomnia  Comment:   Plan: MENTAL HEALTH REFERRAL  - Adult; Outpatient         Treatment; Sleep Psychology; G: [facility] [id number]; We will contact         you to schedule the appointment or please call         with any questions  Advised patient to avoid benadryl use  Encouraged daily activity  Recommended sleep psychology if sleep issues persists      FUTURE APPOINTMENTS:       - Follow-up visit in 1 year    Patient Instructions   Labs today    Call regarding sleep psychology  Do not use benadryl    Walk 20 minutes daily      Cardiology- if no improvement        The information in this document, created by the medical scribe for me, accurately reflects the services I personally performed and the decisions made by me. I have reviewed and approved this document for accuracy prior to leaving the patient care area.  March 10, 2020 1:35 PM    Claire Gross MD  [facility]

## 2020-03-11 LAB
ANION GAP SERPL CALCULATED.3IONS-SCNC: 9 MMOL/L (ref 3–14)
BUN SERPL-MCNC: 15 MG/DL (ref 7–30)
CALCIUM SERPL-MCNC: 9.2 MG/DL (ref 8.5–10.1)
CHLORIDE SERPL-SCNC: 105 MMOL/L (ref 94–109)
CO2 SERPL-SCNC: 27 MMOL/L (ref 20–32)
CREAT SERPL-MCNC: 0.62 MG/DL (ref 0.52–1.04)
GFR SERPL CREATININE-BSD FRML MDRD: 82 ML/MIN/{1.73_M2}
GLUCOSE SERPL-MCNC: 108 MG/DL (ref 70–99)
MAGNESIUM SERPL-MCNC: 2.2 MG/DL (ref 1.6–2.3)
POTASSIUM SERPL-SCNC: 4.3 MMOL/L (ref 3.4–5.3)
SODIUM SERPL-SCNC: 141 MMOL/L (ref 133–144)

## 2020-03-18 ENCOUNTER — TELEPHONE (OUTPATIENT)
Dept: CARDIOLOGY | Facility: CLINIC | Age: 85
End: 2020-03-18

## 2020-03-18 NOTE — TELEPHONE ENCOUNTER
APPOINTMENT RESCHEDULING NOTE    Procedure  Procedure to be rescheduled: Yes  Ordering provider name: Claire Gross  Ordering provider phone number:   Ordering provider confirmation to reschedule after April 1st 2020: Yes    Instructions for Patient  Patient informed to call the ordering provider if symptoms worsen or they have concerns.     Scheduling Phone Numbers  Cardiology Scheduling Phone Number: [phone]    Concerns of COVID  Patient informed to call [facility] (virtual visit) if they are concerned about COVID symptoms (fever, rash, cough, shortness of breath) or exposure.      [facility] instructions:  1. Go to the website [email or web address]  2. Create an account (you will need your insurance information)  3. Start a new visit  4. Choose your diagnosis (e.g. COVID19)  5. Fill out the information about your symptoms  6. A provider will reach out to you by text, phone call or video visit based on your request

## 2020-03-23 ENCOUNTER — TELEPHONE (OUTPATIENT)
Dept: INTERNAL MEDICINE | Facility: CLINIC | Age: 85
End: 2020-03-23

## 2020-03-23 NOTE — TELEPHONE ENCOUNTER
Patient is still having heart palpitations with no changes since she was in to see Dr Gross on 3/10/20. Please call her back to advise at [phone] (home)

## 2020-03-26 ENCOUNTER — VIRTUAL VISIT (OUTPATIENT)
Dept: INTERNAL MEDICINE | Facility: CLINIC | Age: 85
End: 2020-03-26
Payer: COMMERCIAL

## 2020-03-26 DIAGNOSIS — R00.2 INTERMITTENT PALPITATIONS: ICD-10-CM

## 2020-03-26 DIAGNOSIS — I10 ESSENTIAL HYPERTENSION: Primary | ICD-10-CM

## 2020-03-26 PROCEDURE — 99213 OFFICE O/P EST LOW 20 MIN: CPT | Mod: TEL | Performed by: INTERNAL MEDICINE

## 2020-03-26 ASSESSMENT — ENCOUNTER SYMPTOMS
PALPITATIONS: 1
DIZZINESS: 0
SHORTNESS OF BREATH: 0

## 2020-03-26 NOTE — PROGRESS NOTES
Subjective     Vicky Kang is a 85 year old female who presents to clinic today for the following health issues:    HPI   Patient is concerned about symptoms of palpitation.  Patient had an ER visit in February for symptoms of palpitation and heart is pounding.  EKG showed borderline QT interval and labs were essentially negative except low level of potassium.  Patient was replaced with potassium supplement.  Patient had a ZIO Patch which showed 3 episodes of SVT and rare PVC and PAC.  Thyroid lab looks good and she has history of subclinical hypothyroidism.  Denies use of coffee in the evening.  Patient takes metoprolol with her blood pressure medications in the morning.  Patient has been noticing the fluttering more in the bedtime.  For the last 2 days it has been little better.  Patient has been using magnesium supplement for last 1 week.  Patient has a blood pressure monitor at home but does not check her blood pressure or heart rate.    Current Outpatient Medications   Medication Sig Dispense Refill     amLODIPine (NORVASC) 5 MG tablet TAKE 1 TABLET BY MOUTH DAILY 90 tablet 4     losartan-hydrochlorothiazide (HYZAAR) 50-12.5 MG tablet TAKE 1 TABLET BY MOUTH EVERY DAY 90 tablet 0     Magnesium 125 MG CAPS Take 125 mg by mouth daily       metoprolol succinate ER (TOPROL-XL) 50 MG 24 hr tablet TAKE 1 TABLET BY MOUTH EVERY DAY 90 tablet 0     Multiple Vitamins-Minerals (WOMENS 50+ MULTI VITAMIN/MIN PO)        simvastatin (ZOCOR) 20 MG tablet TAKE ONE TABLET BY MOUTH ONCE DAILY AT BEDTIME 90 tablet 2       Review of Systems   Respiratory: Negative for shortness of breath.    Cardiovascular: Positive for palpitations. Negative for chest pain and peripheral edema.   Neurological: Negative for dizziness.        Assessment & Plan     Vicky was seen today for heart problem.    Diagnoses and all orders for this visit:    Essential hypertension    Intermittent palpitations    Since her symptoms are improving I would  continue to monitor.  Advised her to check her blood pressure and heart rate twice a day.  If her symptoms persist we could try to switch her metoprolol to the evening and see.  Patient blood pressure and heart rate we could increase her metoprolol in future to see if that helps her symptomatically.  Has cardiologist appointment in April.  Advised to call if her symptoms are worsening.    Patient is evaluated via a billable Virtual/Telephone visit today. Telephone visit time: 12 minutes    Castillo Dumont MD  [facility]

## 2020-04-06 NOTE — TELEPHONE ENCOUNTER
Patient calls stating that her palpitations are getting worse rather than better and is looking for further advise.

## 2020-04-15 ENCOUNTER — TELEPHONE (OUTPATIENT)
Dept: INTERNAL MEDICINE | Facility: CLINIC | Age: 85
End: 2020-04-15

## 2020-04-15 NOTE — TELEPHONE ENCOUNTER
Patient calls stating that her palpitations are getting worse rather than better and is looking for further advise.  Third call from patient and has done virtual visit.

## 2020-04-15 NOTE — TELEPHONE ENCOUNTER
Patient advised to contact heart clinic, even if she needs to contact on-call MD for concerns.  If she develops shortness of breath, dizziness or chest pain she needs to be seen in ER, patient acknowledges and agrees to do so.  NATALIE Guerrier R.N.

## 2020-04-20 ENCOUNTER — VIRTUAL VISIT (OUTPATIENT)
Dept: CARDIOLOGY | Facility: CLINIC | Age: 85
End: 2020-04-20
Attending: INTERNAL MEDICINE
Payer: COMMERCIAL

## 2020-04-20 VITALS
BODY MASS INDEX: 25.24 KG/M2 | HEART RATE: 58 BPM | DIASTOLIC BLOOD PRESSURE: 62 MMHG | SYSTOLIC BLOOD PRESSURE: 133 MMHG | WEIGHT: 138 LBS

## 2020-04-20 DIAGNOSIS — R00.2 PALPITATIONS: ICD-10-CM

## 2020-04-20 PROCEDURE — 99201 ZZC OFFICE/OUTPT VISIT, NEW, LEVEL I: CPT | Mod: 95 | Performed by: INTERNAL MEDICINE

## 2020-04-20 NOTE — PATIENT INSTRUCTIONS
I reviewed your recent tests.  Your heart monitor from February showed your heart was in a normal rhythm the entire 3 days.  There was no atrial fibrillation or other abnormal heart rhythm.  He did have a few extra heartbeats, but this is normal and everybody has this.  When you triggered the monitor, your heart rate was always in the normal range.  You may be feeling a few skipped beats here and there, however I suspect most of your palpitations are not related to any heart issue.    You can try taking your metoprolol twice daily, this might help reduce your symptoms a little bit.    The echocardiogram that was scheduled can be deferred for now because of the coronavirus issues.  If your palpitations worsen, please call the cardiology clinic and we can arrange to have this done sooner.  Please contact Dr. Gross in the future if your symptoms are persisting, she can help get the echocardiogram scheduled.

## 2020-04-20 NOTE — PROGRESS NOTES
"Vicky Kang is a 85 year old female who is being evaluated via a billable telephone visit.      The patient has been notified of following:     \"This telephone visit will be conducted via a call between you and your physician/provider. We have found that certain health care needs can be provided without the need for a physical exam.  This service lets us provide the care you need with a short phone conversation.  If a prescription is necessary we can send it directly to your pharmacy.  If lab work is needed we can place an order for that and you can then stop by our lab to have the test done at a later time.    Telephone visits are billed at different rates depending on your insurance coverage. During this emergency period, for some insurers they may be billed the same as an in-person visit.  Please reach out to your insurance provider with any questions.    If during the course of the call the physician/provider feels a telephone visit is not appropriate, you will not be charged for this service.\"    Patient has given verbal consent for Telephone visit?  Yes    How would you like to obtain your AVS? Mail a copy    Review Of Systems  Skin: negative  Eyes: negative  Ears/Nose/Throat: negative  Respiratory: No shortness of breath, dyspnea on exertion, cough, or hemoptysis  Cardiovascular: palpitations, flutter, pulsing, more noticeable while laying down or reclined in a chair, mostly at night   Gastrointestinal: negative  Genitourinary: negative  Musculoskeletal: arthritis  Neurologic: negative  Psychiatric: feeling anxious and due to COVID  Hematologic/Lymphatic/Immunologic: negative  Endocrine: negative    Patient reported vitals:  BP: 133/62  Heart rate: 58  Weight:138lb    Skylar Bonilla CMA    Additional provider notes:  85-year-old female seen for palpitations.  She has a history of hypertension, dyslipidemia, and subclinical hypothyroidism.  She has no cardiac history.  She has been on metoprolol for many " years.    She started having palpitations in February 2020.  This would occur mostly at night.  She would describe a fluttering sensation that could last most of the night.  She thinks her pulse was regular, but did think it was a little random or was skipping when she felt a fluttering sensation.  During the day she really had minimal symptoms.  She denies lightheadedness, syncope, chest pain, or dyspnea.    In February 2020 she presented to the ED.  She was in sinus rhythm, labs were normal.  3-day ZIO monitor February 2020 showed sinus rhythm, less than 1% ectopy, 3 runs of SVT up to 8 beats.  Several triggered symptoms with heart racing or fluttering, rhythm was sinus with rate in the 70s, partial correlation with ectopy.    Her symptoms have been better the past 5 to 7 days.  She started taking her metoprolol in the evening.    Data:  March 2020: Potassium 4.3, creatinine 0.6    Assessment:  85-year-old female seen for palpitations.  Her ZIO monitor did not show any concerning arrhythmias, despite having her symptoms.  She may be feeling some symptomatic ectopy.  However her symptoms seem out of proportion even to the ectopy.  This could be anxiety or stress.  Symptoms seem to be improving.  She is already on metoprolol.  She was told she could try taking it twice daily, this may help a little.    Echo can be deferred at this time due to the coronavirus issues.  If her symptoms worsen she could come in for an echo, if her symptoms persist several months from now, she could then come in for her echo.    Recommendations:  1.  Palpitations, possibly from some symptomatic ectopy, but probably mostly noncardiac  -Continue Toprol, she will try increasing the dose to 50 mg twice daily  -Defer echo for now due to coronavirus, but if symptoms persist over the next several months, she could come in for her echo    Phone call duration: 17 minutes    A total of 25 minutes was spent with clinic visit, including chart review  and coordinating care, >50% of time was spent talking with patient.    George Burns MD  Cardiology - [facility]  Pager: [phone]  Text Page  April 20, 2020

## 2020-05-14 DIAGNOSIS — I10 BENIGN HYPERTENSION: ICD-10-CM

## 2020-05-14 RX ORDER — METOPROLOL SUCCINATE 50 MG/1
TABLET, EXTENDED RELEASE ORAL
Qty: 90 TABLET | Refills: 3 | Status: SHIPPED | OUTPATIENT
Start: 2020-05-14 | End: 2020-08-31

## 2020-05-14 RX ORDER — LOSARTAN POTASSIUM AND HYDROCHLOROTHIAZIDE 12.5; 5 MG/1; MG/1
TABLET ORAL
Qty: 90 TABLET | Refills: 3 | Status: SHIPPED | OUTPATIENT
Start: 2020-05-14 | End: 2020-10-15

## 2020-05-28 ENCOUNTER — VIRTUAL VISIT (OUTPATIENT)
Dept: INTERNAL MEDICINE | Facility: CLINIC | Age: 85
End: 2020-05-28
Payer: COMMERCIAL

## 2020-05-28 VITALS — WEIGHT: 135 LBS | BODY MASS INDEX: 24.69 KG/M2

## 2020-05-28 DIAGNOSIS — N39.0 URINARY TRACT INFECTION WITHOUT HEMATURIA, SITE UNSPECIFIED: Primary | ICD-10-CM

## 2020-05-28 RX ORDER — SULFAMETHOXAZOLE/TRIMETHOPRIM 800-160 MG
1 TABLET ORAL 2 TIMES DAILY
Qty: 10 TABLET | Refills: 0 | Status: SHIPPED | OUTPATIENT
Start: 2020-05-28 | End: 2020-06-02

## 2020-05-28 NOTE — PROGRESS NOTES
"Vicky Kang is a 85 year old female who is being evaluated via a billable telephone visit.      The patient has been notified of following:     \"This telephone visit will be conducted via a call between you and your physician/provider. We have found that certain health care needs can be provided without the need for a physical exam.  This service lets us provide the care you need with a short phone conversation.  If a prescription is necessary we can send it directly to your pharmacy.  If lab work is needed we can place an order for that and you can then stop by our lab to have the test done at a later time.    Telephone visits are billed at different rates depending on your insurance coverage. During this emergency period, for some insurers they may be billed the same as an in-person visit.  Please reach out to your insurance provider with any questions.    If during the course of the call the physician/provider feels a telephone visit is not appropriate, you will not be charged for this service.\"    Patient has given verbal consent for Telephone visit?  Yes    What phone number would you like to be contacted at? [phone]    How would you like to obtain your AVS? Mail a copy    Subjective     Vicky Kang is a 85 year old female who presents via phone visit today for the following health issues:    HPI     UTI - Female  Duration of complaint: 1 week    Description:   Painful urination (Dysuria): YES           Frequency: YES  Blood in urine (Hematuria): no  Delay in urine (Hesitency): no   Intensity: moderate  Progression of Symptoms:  intermittent  Accompanying Signs & Symptoms:  Fever/chills: no   Flank pain no   Nausea and vomiting: no   Any vaginal symptoms: none  Abdominal/Pelvic Pain: no   History:   History of frequent UTI's: YES  History of kidney stones: no   Sexually Active: no   Possibility of pregnancy: No  Precipitating factors:   Therapies Tried and outcome: cranberry juice helps "       Reviewed and updated as needed this visit by Provider  Meds  Problems              Objective   Reported vitals:  Wt 61.2 kg (135 lb)   LMP  (LMP Unknown)   BMI 24.69 kg/m     healthy, alert and no distress  PSYCH: Alert and oriented times 3; coherent speech, normal   rate and volume, able to articulate logical thoughts, able   to abstract reason, no tangential thoughts, no hallucinations   or delusions  Her affect is normal  RESP: No cough, no audible wheezing, able to talk in full sentences  Remainder of exam unable to be completed due to telephone visits    Diagnostic Test Results:  Labs reviewed in Epic        Assessment/Plan:  1. Urinary tract infection without hematuria, site unspecified  - symptoms fit with UTI, treatment as below  - follow up if symptoms worsen or fail to improve   - reviewed signs/symptoms that should prompt seeking emergency care   - sulfamethoxazole-trimethoprim (BACTRIM DS) 800-160 MG tablet; Take 1 tablet by mouth 2 times daily for 5 days  Dispense: 10 tablet; Refill: 0    No follow-ups on file.      Phone call duration:  3:38 minutes    Kassie Phan PA-C

## 2020-08-12 DIAGNOSIS — I10 BENIGN HYPERTENSION: ICD-10-CM

## 2020-08-13 RX ORDER — LOSARTAN POTASSIUM AND HYDROCHLOROTHIAZIDE 12.5; 5 MG/1; MG/1
TABLET ORAL
Qty: 90 TABLET | Refills: 3 | OUTPATIENT
Start: 2020-08-13

## 2020-08-31 ENCOUNTER — TELEPHONE (OUTPATIENT)
Dept: CARDIOLOGY | Facility: CLINIC | Age: 85
End: 2020-08-31

## 2020-08-31 DIAGNOSIS — R00.2 PALPITATIONS: Primary | ICD-10-CM

## 2020-08-31 DIAGNOSIS — I10 BENIGN HYPERTENSION: ICD-10-CM

## 2020-08-31 RX ORDER — METOPROLOL SUCCINATE 50 MG/1
50 TABLET, EXTENDED RELEASE ORAL DAILY
Refills: 0 | COMMUNITY
Start: 2020-08-31 | End: 2020-09-10

## 2020-08-31 NOTE — TELEPHONE ENCOUNTER
She can increase her Toprol to 50mg twice daily.  Also come in for echo.  F/u with [facility] after echo.    Freddy

## 2020-08-31 NOTE — TELEPHONE ENCOUNTER
Pt called, states her palpitations severity are worsened than when she had a visit w/ Dr. Burns last April. The palpitations wake her up in the middle of the night, feels like its pounding which she didn't used to have. No change in frequency - just in severity.    Does not take BP at home. Denies LH, dizziness, SOB or chest pain.     Last virtual visit 4/20/20 w/ Dr. Burns for palpitations, possible some symptomatic ectopy but probably noncardiac. Continued Toprol 50 mg daily. Noted could try to increase to 50 mg BID. Would defer Echo d/t coronavirus but if sxs persist could come in for an echo.     Pt states she would be willing to do testing. She has not tried increasing the dose of Toprol yet.   Advised I would let Dr. Burns know the severity of her symptoms has worsened and will call her back.   Cici RN, BSN  08/31/20 3:46 PM

## 2020-08-31 NOTE — TELEPHONE ENCOUNTER
Called pt, reviewed Dr. Burns' recommendations. Pt will increase Toprol to 50 mg BID, has enough pills at home so she does not need a new Rx.   Scheduling will call her tomorrow to setup Echo and [facility] follow-up. Pt aware to monitor for LH/dizziness/fatigue.  Provided direct callback number if any questions/concerns.   Cici BHATIA, BSN  08/31/20 4:23 PM

## 2020-09-02 ENCOUNTER — HOSPITAL ENCOUNTER (OUTPATIENT)
Dept: CARDIOLOGY | Facility: CLINIC | Age: 85
Discharge: HOME | End: 2020-09-02
Attending: INTERNAL MEDICINE | Admitting: INTERNAL MEDICINE
Payer: COMMERCIAL

## 2020-09-02 DIAGNOSIS — R00.2 PALPITATIONS: ICD-10-CM

## 2020-09-02 PROCEDURE — 93306 TTE W/DOPPLER COMPLETE: CPT | Mod: 26 | Performed by: INTERNAL MEDICINE

## 2020-09-02 PROCEDURE — 93306 TTE W/DOPPLER COMPLETE: CPT

## 2020-09-09 NOTE — PROGRESS NOTES
CARDIOLOGY TELEPHONE VISIT  This visit is being conducted as a virtual visit due to the emphasis on mitigation of the COVID-19 virus pandemic. The clinician has decided that the risk of an in-office visit outweighs the benefit for this patient.       Vicky Kang is a 85 year old female who is being evaluated via a billable telephone visit.      The patient has been notified of following:     This telephone visit will be conducted via a call between you and your physician/provider. Given concern for spread of COVID 19 we are minimizing in person clinic visits when possible. We have found that certain health care needs can be provided without the need for a physical exam.  This service lets us provide the care you need with a short phone conversation.  If a prescription is necessary we can send it directly to your pharmacy.  If lab work is needed we can place an order for that and you can then stop by our lab to have the test done at a later time. If during the course of the call the physician/provider feels a telephone visit is not appropriate, you will not be charged for this service.    Telephone visits are billed at different rates depending on your insurance coverage. During this emergency period, for some insurers they may be billed the same as an in-person visit.  Please reach out to your insurance provider with any questions.    Patient has given verbal consent for Telephone visit?  Yes      Primary Cardiologist: Dr. Burns    HPI:  Vicky Kang is a 85 year old female with a history of hypertension, dyslipidemia, and subclinical hypothyroidism.  She has no cardiac history.  She has been on metoprolol for many years. She was seen by Dr. Burns for palpitations.      She started having palpitations in February 2020.  This would occur mostly at night.  She would describe a fluttering sensation that could last most of the night.  She thinks her pulse was regular, but did think it was a little random or  was skipping when she felt a fluttering sensation.  During the day she really had minimal symptoms.  She denies lightheadedness, syncope, chest pain, or dyspnea.     In February 2020 she presented to the ED.  She was in sinus rhythm, labs were normal.  3-day ZIO monitor February 2020 showed sinus rhythm, less than 1% ectopy, 3 runs of SVT up to 8 beats.  Several triggered symptoms with heart racing or fluttering, rhythm was sinus with rate in the 70s, partial correlation with ectopy.     She saw Dr. Burns 4/20/20 by virtual visit. He reviewed the Zio results. He thought that her palpitations could possibly be due to anxiety/stress. An echo was deferred due to COVID19.      8/31/20 Vicky reported an increase in the severity of palpitations. Dr. Burns advised to increase Toprol XL to 50 mg BID and get an echo.    9/2/20 echo:  Normal LV function, LVEF 60-65%, left atrium is mildly dilated, mild TR.     Today, Vicky tells me that with the increase in Toprol XL, her palpitations have lessened. They are still mainly at nighttime. She will feel them, then get upset, then have difficulty falling asleep.    With the increase in Toprol XL, she has checked her BP at home a few times.   The numbers are controlled, but a bit variable.   126/62  141/67    I have reviewed and updated the patient's Past Medical History, Social History, Family History and Medication List.      MEDICATIONS:  Current Outpatient Medications   Medication Sig Dispense Refill     amLODIPine (NORVASC) 5 MG tablet TAKE 1 TABLET BY MOUTH DAILY 90 tablet 4     losartan-hydrochlorothiazide (HYZAAR) 50-12.5 MG tablet TAKE 1 TABLET BY MOUTH EVERY DAY 90 tablet 3     Magnesium 125 MG CAPS Take 125 mg by mouth daily       metoprolol succinate ER (TOPROL-XL) 50 MG 24 hr tablet Take 1 tablet (50 mg) by mouth daily  0     Multiple Vitamins-Minerals (WOMENS 50+ MULTI VITAMIN/MIN PO)        simvastatin (ZOCOR) 20 MG tablet TAKE ONE TABLET BY MOUTH ONCE DAILY  "AT BEDTIME 90 tablet 2       ALLERGIES:   No Known Allergies      MA ROS:  Skin: Negative  Eyes: Positive for glasses  Ears/Nose/Throat: Negative  Respiratory: Negative  Cardiovascular: Positive for palpitations  Gastrointestinal: Positive for occ diarrhea  Genitourinary: Negative  Musculoskeletal: Positive for arthritis  Neurologic: Negative  Psychiatric: Positive for sleep disturbances  Hematologic/Lymphatic/Immunologic: Negative  Endocrine: Negative      Self reported vitals:  Weight: 138 lbs  BP: N/A  HR: N/A      DIAGNOSTICS:  9/2/20 echo:  Interpretation Summary  The visual ejection fraction is estimated at 60-65%.  The left atrium is mildly dilated.        ASSESSMENT/PLAN:  Palpitations  - Known HTN that is controlled  - Zio showed SR with <1% PVCs, short runs of SVT.  She had sxs while wearing the monitor, rhythm was sinus with rate in the 70s, partial correlation with ectopy  - Echo 9/2/20 shows normal LV function, mildly dilated left atrium, mild TR.  No significant structural concerns as cause of her palpitations  - Sxs are improved on Toprol XL 50 mg BID  - She feels some relief knowing her heart is \"ok\"  - She will contact us if sxs worsen    HTN  - With the increase in Toprol XL, we had her check her BP.  They seem to be controlled, but there is some concern that her cuff may not be accurate  - She will get in to our clinic to have one of our nurses check her BP manually and with her cuff      New Rx sent for increased Toprol XL dose      Follow up:  Nurse visit to check cuff  Otherwise, as needed       I have reviewed the note as documented above.  This accurately captures the substance of my conversation with the patient.      Phone call contact time  Call Started at 0732  Call Ended at 0743    Brittaney Root PA-C  [facility]                  "

## 2020-09-10 ENCOUNTER — VIRTUAL VISIT (OUTPATIENT)
Dept: CARDIOLOGY | Facility: CLINIC | Age: 85
End: 2020-09-10
Attending: INTERNAL MEDICINE
Payer: COMMERCIAL

## 2020-09-10 DIAGNOSIS — R00.2 PALPITATIONS: ICD-10-CM

## 2020-09-10 DIAGNOSIS — I10 BENIGN HYPERTENSION: ICD-10-CM

## 2020-09-10 PROCEDURE — 99213 OFFICE O/P EST LOW 20 MIN: CPT | Mod: 95 | Performed by: PHYSICIAN ASSISTANT

## 2020-09-10 RX ORDER — METOPROLOL SUCCINATE 50 MG/1
50 TABLET, EXTENDED RELEASE ORAL 2 TIMES DAILY
Qty: 180 TABLET | Refills: 3 | Status: SHIPPED | OUTPATIENT
Start: 2020-09-10 | End: 2021-06-10

## 2020-09-10 NOTE — LETTER
9/10/2020    Claire Gross MD  [address] [facility] [address]    RE: Vicky Kang       Dear Colleague,    I had the pleasure of seeing Vicky Kang in the [facility].    CARDIOLOGY TELEPHONE VISIT  This visit is being conducted as a virtual visit due to the emphasis on mitigation of the COVID-19 virus pandemic. The clinician has decided that the risk of an in-office visit outweighs the benefit for this patient.       Vicky Kang is a 85 year old female who is being evaluated via a billable telephone visit.      The patient has been notified of following:     This telephone visit will be conducted via a call between you and your physician/provider. Given concern for spread of COVID 19 we are minimizing in person clinic visits when possible. We have found that certain health care needs can be provided without the need for a physical exam.  This service lets us provide the care you need with a short phone conversation.  If a prescription is necessary we can send it directly to your pharmacy.  If lab work is needed we can place an order for that and you can then stop by our lab to have the test done at a later time. If during the course of the call the physician/provider feels a telephone visit is not appropriate, you will not be charged for this service.    Telephone visits are billed at different rates depending on your insurance coverage. During this emergency period, for some insurers they may be billed the same as an in-person visit.  Please reach out to your insurance provider with any questions.    Patient has given verbal consent for Telephone visit?  Yes      Primary Cardiologist: Dr. Burns    HPI:  Vicky Kang is a 85 year old female with a history of hypertension, dyslipidemia, and subclinical hypothyroidism.  She has no cardiac history.  She has been on metoprolol for many years. She was seen by Dr. Burns for palpitations.      She started  having palpitations in February 2020.  This would occur mostly at night.  She would describe a fluttering sensation that could last most of the night.  She thinks her pulse was regular, but did think it was a little random or was skipping when she felt a fluttering sensation.  During the day she really had minimal symptoms.  She denies lightheadedness, syncope, chest pain, or dyspnea.     In February 2020 she presented to the ED.  She was in sinus rhythm, labs were normal.  3-day ZIO monitor February 2020 showed sinus rhythm, less than 1% ectopy, 3 runs of SVT up to 8 beats.  Several triggered symptoms with heart racing or fluttering, rhythm was sinus with rate in the 70s, partial correlation with ectopy.     She saw Dr. Burns 4/20/20 by virtual visit. He reviewed the Zio results. He thought that her palpitations could possibly be due to anxiety/stress. An echo was deferred due to COVID19.      8/31/20 Vicky reported an increase in the severity of palpitations. Dr. Burns advised to increase Toprol XL to 50 mg BID and get an echo.    9/2/20 echo:  Normal LV function, LVEF 60-65%, left atrium is mildly dilated, mild TR.     Today, Vicky tells me that with the increase in Toprol XL, her palpitations have lessened. They are still mainly at nighttime. She will feel them, then get upset, then have difficulty falling asleep.    With the increase in Toprol XL, she has checked her BP at home a few times.   The numbers are controlled, but a bit variable.   126/62  141/67    I have reviewed and updated the patient's Past Medical History, Social History, Family History and Medication List.      MEDICATIONS:  Current Outpatient Medications   Medication Sig Dispense Refill     amLODIPine (NORVASC) 5 MG tablet TAKE 1 TABLET BY MOUTH DAILY 90 tablet 4     losartan-hydrochlorothiazide (HYZAAR) 50-12.5 MG tablet TAKE 1 TABLET BY MOUTH EVERY DAY 90 tablet 3     Magnesium 125 MG CAPS Take 125 mg by mouth daily        "metoprolol succinate ER (TOPROL-XL) 50 MG 24 hr tablet Take 1 tablet (50 mg) by mouth daily  0     Multiple Vitamins-Minerals (WOMENS 50+ MULTI VITAMIN/MIN PO)        simvastatin (ZOCOR) 20 MG tablet TAKE ONE TABLET BY MOUTH ONCE DAILY AT BEDTIME 90 tablet 2       ALLERGIES:   No Known Allergies      MA ROS:  Skin: Negative  Eyes: Positive for glasses  Ears/Nose/Throat: Negative  Respiratory: Negative  Cardiovascular: Positive for palpitations  Gastrointestinal: Positive for occ diarrhea  Genitourinary: Negative  Musculoskeletal: Positive for arthritis  Neurologic: Negative  Psychiatric: Positive for sleep disturbances  Hematologic/Lymphatic/Immunologic: Negative  Endocrine: Negative      Self reported vitals:  Weight: 138 lbs  BP: N/A  HR: N/A      DIAGNOSTICS:  9/2/20 echo:  Interpretation Summary  The visual ejection fraction is estimated at 60-65%.  The left atrium is mildly dilated.        ASSESSMENT/PLAN:  Palpitations  - Known HTN that is controlled  - Zio showed SR with <1% PVCs, short runs of SVT.  She had sxs while wearing the monitor, rhythm was sinus with rate in the 70s, partial correlation with ectopy  - Echo 9/2/20 shows normal LV function, mildly dilated left atrium, mild TR.  No significant structural concerns as cause of her palpitations  - Sxs are improved on Toprol XL 50 mg BID  - She feels some relief knowing her heart is \"ok\"  - She will contact us if sxs worsen    HTN  - With the increase in Toprol XL, we had her check her BP.  They seem to be controlled, but there is some concern that her cuff may not be accurate  - She will get in to our clinic to have one of our nurses check her BP manually and with her cuff      New Rx sent for increased Toprol XL dose      Follow up:  Nurse visit to check cuff  Otherwise, as needed       I have reviewed the note as documented above.  This accurately captures the substance of my conversation with the patient.      Phone call contact time  Call Started at " [phone]  Call Ended at 0743      Thank you for allowing me to participate in the care of your patient.    Sincerely,     Brittaney Root PA-C     [facility]

## 2020-09-10 NOTE — PATIENT INSTRUCTIONS
Pedro Luis Perry,   Your echo results are good.  The heart is functioning normally.  There are no concerns.   Continue the increased dose of Toprol XL 50 mg twice daily. I sent the new prescription to your pharmacy.   We will have you come in to our clinic to check your blood pressure with our manual cuff and your cuff. So remember to bring your cuff with to the visit.   If you have any concerns, please do not hesitate to call us.   Brittaney Root PA-C 9/10/2020 7:57 AM

## 2020-09-11 ENCOUNTER — TELEPHONE (OUTPATIENT)
Dept: CARDIOLOGY | Facility: CLINIC | Age: 85
End: 2020-09-11

## 2020-09-11 ENCOUNTER — ALLIED HEALTH/NURSE VISIT (OUTPATIENT)
Dept: CARDIOLOGY | Facility: CLINIC | Age: 85
End: 2020-09-11
Payer: COMMERCIAL

## 2020-09-11 VITALS
HEART RATE: 68 BPM | SYSTOLIC BLOOD PRESSURE: 132 MMHG | DIASTOLIC BLOOD PRESSURE: 60 MMHG | BODY MASS INDEX: 24.69 KG/M2 | HEIGHT: 62 IN

## 2020-09-11 DIAGNOSIS — I10 BENIGN HYPERTENSION: Primary | ICD-10-CM

## 2020-09-11 PROCEDURE — 99207 ZZC NO CHARGE LOS: CPT

## 2020-09-11 NOTE — TELEPHONE ENCOUNTER
Wellness Screening Tool    Symptom Screening:    Do you have one of the following NEW symptoms:       Fever (subjective or >100.0)?  No    New cough? No    Shortness of breath? No    Chills? No    New loss of taste or smell? No    Generalized body aches? No    New persistent headache? No    New sore throat? No    Nausea, vomiting or diarrhea? No    Within the past 2 weeks, have you been exposed to someone with a known positive illness below?      COVID - 19 (known or suspected) No    Chicken pox?  No    Measles? No    Pertussis? No    Have you had a positive COVID-19 diagnostic test (nasal swab test) in the last 14 days or are you currently   on self-quarantine restrictions (i.e.travel restriction, exposure, etc?) No        Patient notified of visitor restriction: Yes  Patient informed to wear a mask: Yes    Patient's appointment status: Patient will be seen in clinic as scheduled today for nurse visit.   Cici BHATIA, BSN  09/11/20 8:51 AM

## 2020-09-11 NOTE — TELEPHONE ENCOUNTER
Dhruv,   Looks good - liek her cuff is accurate.   Just as you said, make sure she is sitting and relxing for a few minuets prior to checking.   No changes.   Thanks,   Brittaney Root PA-C 9/11/2020 4:00 PM

## 2020-09-11 NOTE — NURSING NOTE
Pt in clinic for nurse only visit per Brittaney Root PA-C. See telephone encounter for details.   Ordering provider: Brittaney Root PA-C   Primary Cardiologist: Dr. Burns    In-clinic Cardiologist: Dr. HEIDI Bolanos RN, BSN  09/11/20 2:18 PM

## 2020-09-11 NOTE — TELEPHONE ENCOUNTER
[facility] NURSE VISIT    Last OV was 9/10/20 w/ Brittaney Root PA-C for palpitations and HTN. BP seem to be controlled, but concern that her cuff is not accurate. Recommended nurse visit to verify.     Patient is here today (09/11/20) for a blood pressure check. Patient took medications at about 8 AM this morning including: Amlodipine 5 mg, losartan-hydrochlorothiazide 50-12.5 mg and metoprolol succinate 50 mg (takes BID).     Manual Blood pressure taken at 2:04 PM  Site: Right arm  Position: Chair  Cuff size: adult regular  BP: 136/60    Pulse: 68 bpm    Pt's home BP cuff machine was checked at 2:11 pm:   Site: Right arm  Position: Chair  BP: 141/66  HR: 65 bpm    Patient's is asymptomatic.   I did review to sit for at least 5 mins prior to taking BP. Take BP at least 1.5- 2 hours after taking medications.     Will route to ordering provider Brittaney Root PA-C   Provider in clinic today: Dr. GORDON  Patient's primary cardiologist: Dr. Grant Bolanos RN 09/11/20 3:08 PM   [facility]

## 2020-09-14 NOTE — TELEPHONE ENCOUNTER
Attempted to call pt, no answer, left vm to call me back.   Cici BHATIA, BSN  09/14/20 11:29 AM     ADDENDUM second attempt, reached pt. Reviewed Brittaney Root PA-C recommendations. Pt understood. Will let us know if she has consistently elevated BPs.   Cici BHATIA, BSN  09/16/20 9:19 AM

## 2020-10-12 DIAGNOSIS — I10 BENIGN HYPERTENSION: ICD-10-CM

## 2020-10-15 RX ORDER — LOSARTAN POTASSIUM AND HYDROCHLOROTHIAZIDE 12.5; 5 MG/1; MG/1
TABLET ORAL
Qty: 90 TABLET | Refills: 1 | Status: SHIPPED | OUTPATIENT
Start: 2020-10-15 | End: 2021-06-10

## 2020-11-10 DIAGNOSIS — E78.5 HYPERLIPIDEMIA LDL GOAL <130: ICD-10-CM

## 2020-11-11 RX ORDER — SIMVASTATIN 20 MG
TABLET ORAL
Qty: 90 TABLET | Refills: 2 | Status: SHIPPED | OUTPATIENT
Start: 2020-11-11 | End: 2021-06-10

## 2021-01-31 ENCOUNTER — IMMUNIZATION (OUTPATIENT)
Dept: NURSING | Facility: CLINIC | Age: 86
End: 2021-01-31
Payer: COMMERCIAL

## 2021-01-31 PROCEDURE — 0001A PR COVID VAC PFIZER DIL RECON 30 MCG/0.3 ML IM: CPT

## 2021-01-31 PROCEDURE — 91300 PR COVID VAC PFIZER DIL RECON 30 MCG/0.3 ML IM: CPT

## 2021-02-21 ENCOUNTER — IMMUNIZATION (OUTPATIENT)
Dept: NURSING | Facility: CLINIC | Age: 86
End: 2021-02-21
Attending: INTERNAL MEDICINE
Payer: COMMERCIAL

## 2021-02-21 PROCEDURE — 91300 PR COVID VAC PFIZER DIL RECON 30 MCG/0.3 ML IM: CPT

## 2021-02-21 PROCEDURE — 0002A PR COVID VAC PFIZER DIL RECON 30 MCG/0.3 ML IM: CPT

## 2021-04-27 ENCOUNTER — TRANSFERRED RECORDS (OUTPATIENT)
Dept: HEALTH INFORMATION MANAGEMENT | Facility: CLINIC | Age: 86
End: 2021-04-27

## 2021-05-07 DIAGNOSIS — I10 BENIGN HYPERTENSION: ICD-10-CM

## 2021-05-07 RX ORDER — AMLODIPINE BESYLATE 5 MG/1
TABLET ORAL
Qty: 90 TABLET | Refills: 4 | Status: SHIPPED | OUTPATIENT
Start: 2021-05-07 | End: 2021-06-10

## 2021-05-07 NOTE — TELEPHONE ENCOUNTER
Pending Prescriptions:                       Disp   Refills    amLODIPine (NORVASC) 5 MG tablet [Pharmacy*90 tab*4        Sig: TAKE 1 TAB BY MOUTH ONCE DAILY    Routing refill request to provider for review/approval because:  Creatinine   Date Value Ref Range Status   03/10/2020 0.62 0.52 - 1.04 mg/dL Final

## 2021-06-10 ENCOUNTER — OFFICE VISIT (OUTPATIENT)
Dept: INTERNAL MEDICINE | Facility: CLINIC | Age: 86
End: 2021-06-10
Payer: COMMERCIAL

## 2021-06-10 VITALS
HEIGHT: 62 IN | DIASTOLIC BLOOD PRESSURE: 58 MMHG | TEMPERATURE: 98 F | OXYGEN SATURATION: 93 % | WEIGHT: 137.8 LBS | SYSTOLIC BLOOD PRESSURE: 128 MMHG | HEART RATE: 85 BPM | BODY MASS INDEX: 25.36 KG/M2 | RESPIRATION RATE: 16 BRPM

## 2021-06-10 DIAGNOSIS — E78.5 HYPERLIPIDEMIA LDL GOAL <130: ICD-10-CM

## 2021-06-10 DIAGNOSIS — M54.2 CERVICALGIA: ICD-10-CM

## 2021-06-10 DIAGNOSIS — R60.0 BILATERAL LEG EDEMA: Primary | ICD-10-CM

## 2021-06-10 DIAGNOSIS — I10 BENIGN HYPERTENSION: ICD-10-CM

## 2021-06-10 PROCEDURE — 99214 OFFICE O/P EST MOD 30 MIN: CPT | Performed by: INTERNAL MEDICINE

## 2021-06-10 RX ORDER — HYDROCHLOROTHIAZIDE 12.5 MG/1
12.5 TABLET ORAL DAILY
Qty: 30 TABLET | Refills: 1 | Status: SHIPPED | OUTPATIENT
Start: 2021-06-10 | End: 2023-02-21

## 2021-06-10 RX ORDER — AMLODIPINE BESYLATE 5 MG/1
TABLET ORAL
Qty: 90 TABLET | Refills: 0 | Status: SHIPPED | OUTPATIENT
Start: 2021-06-10 | End: 2021-07-15

## 2021-06-10 RX ORDER — SIMVASTATIN 20 MG
TABLET ORAL
Qty: 90 TABLET | Refills: 0 | Status: SHIPPED | OUTPATIENT
Start: 2021-06-10 | End: 2021-07-15

## 2021-06-10 RX ORDER — LOSARTAN POTASSIUM AND HYDROCHLOROTHIAZIDE 12.5; 5 MG/1; MG/1
1 TABLET ORAL DAILY
Qty: 90 TABLET | Refills: 0 | Status: SHIPPED | OUTPATIENT
Start: 2021-06-10 | End: 2021-07-15

## 2021-06-10 RX ORDER — METOPROLOL SUCCINATE 50 MG/1
50 TABLET, EXTENDED RELEASE ORAL 2 TIMES DAILY
Qty: 180 TABLET | Refills: 0 | Status: SHIPPED | OUTPATIENT
Start: 2021-06-10 | End: 2021-07-15

## 2021-06-10 ASSESSMENT — MIFFLIN-ST. JEOR: SCORE: 1018.31

## 2021-06-10 NOTE — PROGRESS NOTES
"    Patient's instructions / PLAN:                                                        Plan:  1. Compression socks, legs elevated, low salt diet help the leg swelling  2. If the leg swelling persists you may take hydrochlorathiazide 1 tablet dailty  3. Continue the other meds, same doses for now.  4. Please make an appointment for annual wellness, come fasting  5. Neck ultrasound  -- To schedule this test you may call Scheduling center at [phone]         ASSESSMENT & PLAN:                                                      (R60.0) Bilateral leg edema  (primary encounter diagnosis)  Comment: suspect vv insuf  Plan: hydrochlorothiazide (HYDRODIURIL) 12.5 MG         tablet        .    (I10) Benign hypertension  Comment: Controlled    Plan: amLODIPine (NORVASC) 5 MG tablet,         losartan-hydrochlorothiazide (HYZAAR) 50-12.5         MG tablet, metoprolol succinate ER (TOPROL-XL)         50 MG 24 hr tablet            (E78.5) Hyperlipidemia LDL goal <130  Comment:   Plan: simvastatin (ZOCOR) 20 MG tablet               Chief Complaint:                                                      Leg edema  Follow up chronic medical problems          SUBJECTIVE:                                                    History of present illness     Leg edema   -- on/off x 1 y  -- tries to keep legs elevated   -- Amlodipine at least since 2013   -- Creat 0.62  -- echo Sept 2020 -- normal     Palpitations   -- 2019  -- resolved after Metoprolol was increased to bid    Neck pain  -- very mild like a \" funny feeling\"   -- R side.   -- exam : no tenderness     ROS:                                                      ROS: negative for fever, chills, cough, wheezes, chest pain, shortness of breath, vomiting, abdominal pain, pos for pos for  leg swelling       OBJECTIVE:                                                    Physical Exam :    Blood pressure 128/58, pulse 85, temperature 98  F (36.7  C), temperature source Oral, resp. " "rate 16, height 1.575 m (5' 2\"), weight 62.5 kg (137 lb 12.8 oz), SpO2 93 %, not currently breastfeeding.   NAD, appears comfortable  Skin: no rashes     Neck: supple, no JVD, No thyroidmegaly. Lymph nodes nonpalpable cervical and supraclavicular.  Chest: clear to auscultation bilaterally, good respiratory effort  Heart: S1 S2, RRR, no mgr appreciated  Abdomen: soft, not tender, no hepatosplenomegaly or masses appreciated, no abdominal bruit, present bowel sounds  Extremities: no edema,  Neurologic: A, Ox3, no focal signs appreciated    PMHx: reviewed  Past Medical History:   Diagnosis Date     Abnormal finding on thyroid function test 11/10/2015     Hyperlipidemia      Hypertension      Neuropathy      Subclinical hypothyroidism 11/10/2015     Thyroid nodule 11/10/2015      PSHx: reviewed  Past Surgical History:   Procedure Laterality Date     CATARACT IOL, RT/LT       ENT SURGERY   Spring 2017    throat cyst removal     PHACOEMULSIFICATION CLEAR CORNEA WITH STANDARD INTRAOCULAR LENS IMPLANT  1/5/2012    Procedure:PHACOEMULSIFICATION CLEAR CORNEA WITH STANDARD INTRAOCULAR LENS IMPLANT; LEFT PHACOEMULSIFICATION CLEAR CORNEA WITH STANDARD LENS IMPLANT ; Surgeon:TUNDE GARDNER; Location:[facility]     VITRECTOMY ANTERIOR          Meds: reviewed  Current Outpatient Medications   Medication Sig Dispense Refill     amLODIPine (NORVASC) 5 MG tablet TAKE 1 TAB BY MOUTH ONCE DAILY 90 tablet 4     losartan-hydrochlorothiazide (HYZAAR) 50-12.5 MG tablet TAKE 1 TABLET BY MOUTH EVERY DAY 90 tablet 1     Magnesium 125 MG CAPS Take 125 mg by mouth daily        metoprolol succinate ER (TOPROL-XL) 50 MG 24 hr tablet Take 1 tablet (50 mg) by mouth 2 times daily 180 tablet 3     Multiple Vitamins-Minerals (WOMENS 50+ MULTI VITAMIN/MIN PO)        simvastatin (ZOCOR) 20 MG tablet TAKE ONE TABLET BY MOUTH ONCE DAILY AT BEDTIME 90 tablet 2       Soc Hx: reviewed  Fam Hx: reviewed          Leatha Hood MD  Internal Medicine       "

## 2021-06-10 NOTE — PATIENT INSTRUCTIONS
Plan:  1. Compression socks, legs elevated, low salt diet help the leg swelling  2. If the leg swelling persists you may take hydrochlorathiazide 1 tablet dailty  3. Continue the other meds, same doses for now.  4. Please make an appointment for annual wellness, come fasting  5. Neck ultrasound  -- To schedule this test you may call Scheduling center at [phone]

## 2021-06-22 ENCOUNTER — HOSPITAL ENCOUNTER (OUTPATIENT)
Dept: ULTRASOUND IMAGING | Facility: CLINIC | Age: 86
Discharge: HOME | End: 2021-06-22
Attending: INTERNAL MEDICINE | Admitting: INTERNAL MEDICINE
Payer: COMMERCIAL

## 2021-06-22 DIAGNOSIS — M54.2 CERVICALGIA: ICD-10-CM

## 2021-06-22 PROCEDURE — 93880 EXTRACRANIAL BILAT STUDY: CPT

## 2021-07-15 ENCOUNTER — OFFICE VISIT (OUTPATIENT)
Dept: INTERNAL MEDICINE | Facility: CLINIC | Age: 86
End: 2021-07-15
Payer: COMMERCIAL

## 2021-07-15 VITALS
TEMPERATURE: 98 F | OXYGEN SATURATION: 95 % | HEART RATE: 71 BPM | SYSTOLIC BLOOD PRESSURE: 136 MMHG | HEIGHT: 62 IN | RESPIRATION RATE: 18 BRPM | WEIGHT: 132.1 LBS | BODY MASS INDEX: 24.31 KG/M2 | DIASTOLIC BLOOD PRESSURE: 68 MMHG

## 2021-07-15 DIAGNOSIS — I10 BENIGN HYPERTENSION: ICD-10-CM

## 2021-07-15 DIAGNOSIS — M85.80 OSTEOPENIA, UNSPECIFIED LOCATION: ICD-10-CM

## 2021-07-15 DIAGNOSIS — Z78.0 ASYMPTOMATIC POSTMENOPAUSAL STATUS: ICD-10-CM

## 2021-07-15 DIAGNOSIS — E78.5 HYPERLIPIDEMIA LDL GOAL <130: ICD-10-CM

## 2021-07-15 DIAGNOSIS — Z00.00 ROUTINE GENERAL MEDICAL EXAMINATION AT A HEALTH CARE FACILITY: Primary | ICD-10-CM

## 2021-07-15 DIAGNOSIS — R60.0 BILATERAL LEG EDEMA: ICD-10-CM

## 2021-07-15 DIAGNOSIS — K52.9 CHRONIC DIARRHEA OF UNKNOWN ORIGIN: ICD-10-CM

## 2021-07-15 LAB
C DIFF TOX B STL QL: NEGATIVE
ERYTHROCYTE [DISTWIDTH] IN BLOOD BY AUTOMATED COUNT: 12.4 % (ref 10–15)
HCT VFR BLD AUTO: 44.8 % (ref 35–47)
HGB BLD-MCNC: 14.9 G/DL (ref 11.7–15.7)
MCH RBC QN AUTO: 31.7 PG (ref 26.5–33)
MCHC RBC AUTO-ENTMCNC: 33.3 G/DL (ref 31.5–36.5)
MCV RBC AUTO: 95 FL (ref 78–100)
PLATELET # BLD AUTO: 285 10E3/UL (ref 150–450)
PTH-INTACT SERPL-MCNC: 37 PG/ML (ref 18–80)
RBC # BLD AUTO: 4.7 10E6/UL (ref 3.8–5.2)
WBC # BLD AUTO: 6.6 10E3/UL (ref 4–11)

## 2021-07-15 PROCEDURE — 87328 CRYPTOSPORIDIUM AG IA: CPT | Performed by: INTERNAL MEDICINE

## 2021-07-15 PROCEDURE — 84443 ASSAY THYROID STIM HORMONE: CPT | Performed by: INTERNAL MEDICINE

## 2021-07-15 PROCEDURE — 87493 C DIFF AMPLIFIED PROBE: CPT | Mod: 59 | Performed by: INTERNAL MEDICINE

## 2021-07-15 PROCEDURE — 80061 LIPID PANEL: CPT | Performed by: INTERNAL MEDICINE

## 2021-07-15 PROCEDURE — 82306 VITAMIN D 25 HYDROXY: CPT | Performed by: INTERNAL MEDICINE

## 2021-07-15 PROCEDURE — 99213 OFFICE O/P EST LOW 20 MIN: CPT | Mod: 25 | Performed by: INTERNAL MEDICINE

## 2021-07-15 PROCEDURE — 99397 PER PM REEVAL EST PAT 65+ YR: CPT | Performed by: INTERNAL MEDICINE

## 2021-07-15 PROCEDURE — 87329 GIARDIA AG IA: CPT | Mod: 59 | Performed by: INTERNAL MEDICINE

## 2021-07-15 PROCEDURE — 36415 COLL VENOUS BLD VENIPUNCTURE: CPT | Performed by: INTERNAL MEDICINE

## 2021-07-15 PROCEDURE — 82043 UR ALBUMIN QUANTITATIVE: CPT | Performed by: INTERNAL MEDICINE

## 2021-07-15 PROCEDURE — 87506 IADNA-DNA/RNA PROBE TQ 6-11: CPT | Performed by: INTERNAL MEDICINE

## 2021-07-15 PROCEDURE — 80053 COMPREHEN METABOLIC PANEL: CPT | Performed by: INTERNAL MEDICINE

## 2021-07-15 PROCEDURE — 85027 COMPLETE CBC AUTOMATED: CPT | Performed by: INTERNAL MEDICINE

## 2021-07-15 PROCEDURE — 83970 ASSAY OF PARATHORMONE: CPT | Performed by: INTERNAL MEDICINE

## 2021-07-15 RX ORDER — AMLODIPINE BESYLATE 5 MG/1
TABLET ORAL
Qty: 90 TABLET | Refills: 1 | Status: SHIPPED | OUTPATIENT
Start: 2021-07-15 | End: 2022-06-06

## 2021-07-15 RX ORDER — SIMVASTATIN 20 MG
TABLET ORAL
Qty: 90 TABLET | Refills: 1 | Status: SHIPPED | OUTPATIENT
Start: 2021-07-15 | End: 2022-06-06

## 2021-07-15 RX ORDER — LOSARTAN POTASSIUM AND HYDROCHLOROTHIAZIDE 12.5; 5 MG/1; MG/1
1 TABLET ORAL DAILY
Qty: 90 TABLET | Refills: 1 | Status: SHIPPED | OUTPATIENT
Start: 2021-07-15 | End: 2022-03-01

## 2021-07-15 RX ORDER — METOPROLOL SUCCINATE 50 MG/1
50 TABLET, EXTENDED RELEASE ORAL 2 TIMES DAILY
Qty: 180 TABLET | Refills: 1 | Status: SHIPPED | OUTPATIENT
Start: 2021-07-15 | End: 2022-07-19

## 2021-07-15 ASSESSMENT — ENCOUNTER SYMPTOMS
SORE THROAT: 0
COUGH: 0
WEAKNESS: 0
SHORTNESS OF BREATH: 0
DYSURIA: 0
PALPITATIONS: 0
NAUSEA: 0
CHILLS: 0
BREAST MASS: 0
FREQUENCY: 0
EYE PAIN: 0
DIARRHEA: 1
HEMATURIA: 0
DIZZINESS: 0
HEMATOCHEZIA: 0
PARESTHESIAS: 0
NERVOUS/ANXIOUS: 0
FEVER: 0
ABDOMINAL PAIN: 1
HEADACHES: 0
JOINT SWELLING: 0
CONSTIPATION: 0
MYALGIAS: 0
HEARTBURN: 0
ARTHRALGIAS: 1

## 2021-07-15 ASSESSMENT — ACTIVITIES OF DAILY LIVING (ADL): CURRENT_FUNCTION: NO ASSISTANCE NEEDED

## 2021-07-15 ASSESSMENT — MIFFLIN-ST. JEOR: SCORE: 989.28

## 2021-07-15 NOTE — PATIENT INSTRUCTIONS
Plan:  1. Stool tests  2. May take 1 tablet Imodium before you go for a restaurant meal   3.  Labs today - [address]   4. Bone density scan -- [phone] ( )  5. Continue same meds, same doses for now   6. Follow up in Jan 2022  7. If we need to discuss results - schedule video appointment       For info about how to use My Chart: call   [employer] Help Line [phone]   [employer] Log-on Page: [email or web address]  [email or web address] Page: [email or web address]

## 2021-07-15 NOTE — NURSING NOTE
"BP (!) 146/70 (BP Location: Right arm, Patient Position: Sitting, Cuff Size: Adult Regular)   Pulse 71   Temp 98  F (36.7  C) (Oral)   Resp 18   Ht 1.57 m (5' 1.8\")   Wt 59.9 kg (132 lb 1.6 oz)   LMP  (LMP Unknown)   SpO2 95%   BMI 24.32 kg/m      "

## 2021-07-15 NOTE — PROGRESS NOTES
Dr Hood's note    Patient's instructions / PLAN:                                                        Plan:  1. Stool tests  2. May take 1 tablet Imodium before you go for a restaurant meal   3.  Labs today - [address]   4. Bone density scan -- [phone] ( )  5. Continue same meds, same doses for now   6. Follow up in Jan 2022  7. If we need to discuss results - schedule video appointment       ASSESSMENT & PLAN:                                                      (Z00.00) Routine general medical examination at a health care facility  (primary encounter diagnosis)  Comment:   Plan: Comprehensive metabolic panel, CBC with         platelets, Lipid panel reflex to direct LDL         Fasting, Albumin Random Urine Quantitative with        Creat Ratio, TSH with free T4 reflex,         Parathyroid Hormone Intact, Vitamin D         Deficiency            (K52.9) Chronic diarrhea of unknown origin  Comment:   -- it is on/off. We Discussed  About this, it is more of inconvenience for her then a disease. I am not considering colonoscopy at this time or empiric Abx. Imodium as above   Plan: Enteric Bacteria and Virus Panel by JEFF Stool,         Clostridium difficile Toxin B PCR,         Cryptosporidium/Giardia Immunoassay,         Comprehensive metabolic panel, CBC with         platelets, Lipid panel reflex to direct LDL         Fasting, Albumin Random Urine Quantitative with        Creat Ratio, TSH with free T4 reflex, Vitamin D        Deficiency            (Z78.0) Asymptomatic postmenopausal status  Comment:   Plan: DX Hip/Pelvis/Spine            (M85.80) Osteopenia, unspecified location  Comment:   Plan: DX Hip/Pelvis/Spine, TSH with free T4 reflex,         Parathyroid Hormone Intact, Vitamin D         Deficiency            (I10) Benign hypertension  Comment: Controlled    Plan: amLODIPine (NORVASC) 5 MG tablet,         losartan-hydrochlorothiazide (HYZAAR) 50-12.5         MG tablet, metoprolol succinate ER  (TOPROL-XL)         50 MG 24 hr tablet            (E78.5) Hyperlipidemia LDL goal <130  Comment: Controlled    Amlodipine and simvastatin - no side effects for this pt. Cont both Plan: Comprehensive metabolic panel, CBC with         platelets, Lipid panel reflex to direct LDL         Fasting, TSH with free T4 reflex, simvastatin         (ZOCOR) 20 MG tablet            (R60.0) Bilateral leg edema  Comment: resolved   Plan: Comprehensive metabolic panel                     Chief Complaint:                                                        Annual exam  Follow up chronic medical problems      SUBJECTIVE:                                                    History of present illness     We reviewed the chronic medical problems as above.   I reviewed the recent tests results in Epic         Carotid US results --  Discussed      Diarrhea  -- dx w IBS in her 20s  -- off/on for years   -- when going out to eat she has diarrhea after the meal  -- 2018 -- it resolved w Cipro x 1 week   -- a lot of gas  -- Imodium gives partial relief  -- colonoscopy 2013 -- small adenom polyps  -- once a week in the last weeks.   -- she is afraid that the loose BM will happen when she is out shopping     Osteopenia  -- dx 2013  -- she hasn't had meds for it, nor PTH    LOV: Plan:  1. Compression socks, legs elevated, low salt diet help the leg swelling  2. If the leg swelling persists you may take hydrochlorathiazide 1 tablet dailty  3. Continue the other meds, same doses for now.  4. Please make an appointment for annual wellness, come fasting  5. Neck ultrasound  -- To schedule this test you may call Scheduling center at [phone]        ROS:     See below        PMHx: - reviewed  Past Medical History:   Diagnosis Date     Abnormal finding on thyroid function test 11/10/2015     Hyperlipidemia      Hypertension      Neuropathy      Subclinical hypothyroidism 11/10/2015     Thyroid nodule 11/10/2015       PSHx: reviewed  Past Surgical  History:   Procedure Laterality Date     CATARACT IOL, RT/LT       ENT SURGERY   Spring 2017    throat cyst removal     PHACOEMULSIFICATION CLEAR CORNEA WITH STANDARD INTRAOCULAR LENS IMPLANT  1/5/2012    Procedure:PHACOEMULSIFICATION CLEAR CORNEA WITH STANDARD INTRAOCULAR LENS IMPLANT; LEFT PHACOEMULSIFICATION CLEAR CORNEA WITH STANDARD LENS IMPLANT ; Surgeon:TUNDE GARDNER; Location: [facility]     VITRECTOMY ANTERIOR          Soc Hx: No daily alcohol, no smoking  Social History     Socioeconomic History     Marital status:      Spouse name: Not on file     Number of children: Not on file     Years of education: Not on file     Highest education level: Not on file   Occupational History     Not on file   Tobacco Use     Smoking status: Never Smoker     Smokeless tobacco: Never Used   Substance and Sexual Activity     Alcohol use: No     Alcohol/week: 0.0 standard drinks     Drug use: No     Sexual activity: Not Currently     Partners: Male   Other Topics Concern     Parent/sibling w/ CABG, MI or angioplasty before 65F 55M? Not Asked      Service Not Asked     Blood Transfusions Not Asked     Caffeine Concern No     Comment: a couple iced teas daily     Occupational Exposure Not Asked     Hobby Hazards Not Asked     Sleep Concern Not Asked     Stress Concern Not Asked     Weight Concern Not Asked     Special Diet No     Back Care Not Asked     Exercise No     Comment: none recently     Bike Helmet Not Asked     Seat Belt Not Asked     Self-Exams Not Asked   Social History Narrative     Not on file     Social Determinants of Health     Financial Resource Strain:      Difficulty of Paying Living Expenses:    Food Insecurity:      Worried About Running Out of Food in the Last Year:      Ran Out of Food in the Last Year:    Transportation Needs:      Lack of Transportation (Medical):      Lack of Transportation (Non-Medical):    Physical Activity:      Days of Exercise per Week:      Minutes of Exercise  "per Session:    Stress:      Feeling of Stress :    Social Connections:      Frequency of Communication with Friends and Family:      Frequency of Social Gatherings with Friends and Family:      Attends Jew Services:      Active Member of Clubs or Organizations:      Attends Club or Organization Meetings:      Marital Status:    Intimate Partner Violence:      Fear of Current or Ex-Partner:      Emotionally Abused:      Physically Abused:      Sexually Abused:         Fam Hx: reviewed  Family History   Problem Relation Age of Onset     Cancer Mother      Family History Negative Father          Screening: reviewed      All: reviewed    Meds: reviewed  Current Outpatient Medications   Medication Sig Dispense Refill     amLODIPine (NORVASC) 5 MG tablet TAKE 1 TAB BY MOUTH ONCE DAILY 90 tablet 0     losartan-hydrochlorothiazide (HYZAAR) 50-12.5 MG tablet Take 1 tablet by mouth daily 90 tablet 0     metoprolol succinate ER (TOPROL-XL) 50 MG 24 hr tablet Take 1 tablet (50 mg) by mouth 2 times daily 180 tablet 0     Multiple Vitamins-Minerals (WOMENS 50+ MULTI VITAMIN/MIN PO)        simvastatin (ZOCOR) 20 MG tablet TAKE ONE TABLET BY MOUTH ONCE DAILY AT BEDTIME 90 tablet 0     hydrochlorothiazide (HYDRODIURIL) 12.5 MG tablet Take 1 tablet (12.5 mg) by mouth daily (Patient not taking: Reported on 7/15/2021) 30 tablet 1     Magnesium 125 MG CAPS Take 125 mg by mouth daily  (Patient not taking: Reported on 7/15/2021)         OBJECTIVE:                                                    Physical Exam :  Blood pressure (!) 146/70, pulse 71, temperature 98  F (36.7  C), temperature source Oral, resp. rate 18, height 1.57 m (5' 1.8\"), weight 59.9 kg (132 lb 1.6 oz), SpO2 95 %, not currently breastfeeding.     NAD, appears comfortable  Skin clear, no rashes  Neck: supple, no JVD,  no thyroidmegaly  Lymph nodes non palpable in the cervical, supraclavicular axillaries,   Chest: clear to auscultation with good respiratory " "effort  Cardiac: S1S2, RRR, no mgr appreciated  Abdomen: soft, not tender, not distended, audible bowel sound, no hepatosplenomegaly, no palpable masses, no abdominal bruits  Extremities: no cyanosis, clubbing or edema.   Neuro: A, Ox3, no focal signs.  Breast exam in supine and erect position: they are symmetrical, no skin changes, no tenderness or nodes on palpation. Nipples are erect, no skin lesions, no discharge on pressure.    Pelvic exam: deferred, s/p menopause, no symptoms, no hx of abnormal pap         Leatha Hood MD  Internal Medicine       SUBJECTIVE:   Vicky Kang is a 86 year old female who presents for Preventive Visit.      Patient has been advised of split billing requirements and indicates understanding: Yes   Are you in the first 12 months of your Medicare coverage?  No    Healthy Habits:     In general, how would you rate your overall health?  Excellent    Frequency of exercise:  1 day/week    Duration of exercise:  15-30 minutes    Do you usually eat at least 4 servings of fruit and vegetables a day, include whole grains    & fiber and avoid regularly eating high fat or \"junk\" foods?  Yes    Taking medications regularly:  Yes    Medication side effects:  None    Ability to successfully perform activities of daily living:  No assistance needed    Home Safety:  No safety concerns identified    Hearing Impairment:  Difficulty understanding speech on the telephone    In the past 6 months, have you been bothered by leaking of urine?  No    In general, how would you rate your overall mental or emotional health?  Excellent      PHQ-2 Total Score: 0    Additional concerns today:  Yes    Do you feel safe in your environment? Yes    Have you ever done Advance Care Planning? (For example, a Health Directive, POLST, or a discussion with a medical provider or your loved ones about your wishes): Yes, patient states has an Advance Care Planning document and will bring a copy to the clinic.       Fall " risk  Fallen 2 or more times in the past year?: No  Any fall with injury in the past year?: No  click delete button to remove this line now  Cognitive Screening   1) Repeat 3 items (Leader, Season, Table)    2) Clock draw: NORMAL  3) 3 item recall: Recalls 3 objects  Results: 3 items recalled: COGNITIVE IMPAIRMENT LESS LIKELY    Mini-CogTM Copyright [employer]. Licensed by the author for use in [facility]; reprinted with permission (soob@[facility]). All rights reserved.      Do you have sleep apnea, excessive snoring or daytime drowsiness?: no    Reviewed and updated as needed this visit by clinical staff   Allergies               Reviewed and updated as needed this visit by Provider                Social History     Tobacco Use     Smoking status: Never Smoker     Smokeless tobacco: Never Used   Substance Use Topics     Alcohol use: No     Alcohol/week: 0.0 standard drinks     If you drink alcohol do you typically have >3 drinks per day or >7 drinks per week? No    Alcohol Use 12/10/2019   Prescreen: >3 drinks/day or >7 drinks/week? No   Prescreen: >3 drinks/day or >7 drinks/week? -   No flowsheet data found.            Current providers sharing in care for this patient include:   Patient Care Team:  Leatha Conroy MD as PCP - General (Internal Medicine)  Brittaney Root PA-C as Assigned Heart and Vascular Provider  Leatha Conroy MD as Assigned PCP    The following health maintenance items are reviewed in Epic and correct as of today:  Health Maintenance Due   Topic Date Due     ANNUAL REVIEW OF HM ORDERS  Never done     MEDICARE ANNUAL WELLNESS VISIT  12/10/2020     Labs reviewed in EPIC        Pertinent mammograms are reviewed under the imaging tab.    Review of Systems   Constitutional: Negative for chills and fever.   HENT: Positive for congestion. Negative for ear pain, hearing loss and sore throat.    Eyes: Negative for pain and visual disturbance.   Respiratory:  "Negative for cough and shortness of breath.    Cardiovascular: Positive for peripheral edema. Negative for chest pain and palpitations.   Gastrointestinal: Positive for abdominal pain and diarrhea. Negative for constipation, heartburn, hematochezia and nausea.   Breasts:  Negative for tenderness, breast mass and discharge.   Genitourinary: Negative for dysuria, frequency, genital sores, hematuria, pelvic pain, urgency, vaginal bleeding and vaginal discharge.   Musculoskeletal: Positive for arthralgias. Negative for joint swelling and myalgias.   Skin: Negative for rash.   Neurological: Negative for dizziness, weakness, headaches and paresthesias.   Psychiatric/Behavioral: Negative for mood changes. The patient is not nervous/anxious.        Patient has been advised of split billing requirements and indicates understanding: Yes At the check in, at the    COUNSELING:  Reviewed preventive health counseling, as reflected in patient instructions       Regular exercise       Healthy diet/nutrition    Estimated body mass index is 25.2 kg/m  as calculated from the following:    Height as of 6/10/21: 1.575 m (5' 2\").    Weight as of 6/10/21: 62.5 kg (137 lb 12.8 oz).        She reports that she has never smoked. She has never used smokeless tobacco.      Appropriate preventive services were discussed with this patient, including applicable screening as appropriate for cardiovascular disease, diabetes, osteopenia/osteoporosis, and glaucoma.  As appropriate for age/gender, discussed screening for colorectal cancer, prostate cancer, breast cancer, and cervical cancer. Checklist reviewing preventive services available has been given to the patient.    Reviewed patients plan of care and provided an AVS. The Basic Care Plan (routine screening as documented in Health Maintenance) for Vicky meets the Care Plan requirement. This Care Plan has been established and reviewed with the Patient.    Counseling Resources:  ATP IV " Guidelines  Pooled Cohorts Equation Calculator  Breast Cancer Risk Calculator  Breast Cancer: Medication to Reduce Risk  FRAX Risk Assessment  ICSI Preventive Guidelines  Dietary Guidelines for Americans, 2010  Sporting Mouth's MyPlate  ASA Prophylaxis  Lung CA Screening    Leatha Conroy MD  [facility]    Identified Health Risks:

## 2021-07-16 LAB
ALBUMIN SERPL-MCNC: 3.7 G/DL (ref 3.4–5)
ALP SERPL-CCNC: 79 U/L (ref 40–150)
ALT SERPL W P-5'-P-CCNC: 31 U/L (ref 0–50)
ANION GAP SERPL CALCULATED.3IONS-SCNC: 6 MMOL/L (ref 3–14)
AST SERPL W P-5'-P-CCNC: 17 U/L (ref 0–45)
BILIRUB SERPL-MCNC: 0.8 MG/DL (ref 0.2–1.3)
BUN SERPL-MCNC: 12 MG/DL (ref 7–30)
C COLI+JEJUNI+LARI FUSA STL QL NAA+PROBE: NOT DETECTED
C PARVUM AG STL QL IA: NEGATIVE
CALCIUM SERPL-MCNC: 9.5 MG/DL (ref 8.5–10.1)
CHLORIDE BLD-SCNC: 105 MMOL/L (ref 94–109)
CHOLEST SERPL-MCNC: 150 MG/DL
CO2 SERPL-SCNC: 28 MMOL/L (ref 20–32)
CREAT SERPL-MCNC: 0.76 MG/DL (ref 0.52–1.04)
CREAT UR-MCNC: 181 MG/DL
DEPRECATED CALCIDIOL+CALCIFEROL SERPL-MC: 23 UG/L (ref 20–75)
EC STX1 GENE STL QL NAA+PROBE: NOT DETECTED
EC STX2 GENE STL QL NAA+PROBE: NOT DETECTED
FASTING STATUS PATIENT QL REPORTED: YES
G LAMBLIA AG STL QL IA: NEGATIVE
GFR SERPL CREATININE-BSD FRML MDRD: 71 ML/MIN/1.73M2
GLUCOSE BLD-MCNC: 106 MG/DL (ref 70–99)
HDLC SERPL-MCNC: 46 MG/DL
LDLC SERPL CALC-MCNC: 59 MG/DL
MICROALBUMIN UR-MCNC: 68 MG/DL
MICROALBUMIN/CREAT UR: 37.57 MG/G CR (ref 0–25)
NONHDLC SERPL-MCNC: 104 MG/DL
NOROV GI+II ORF1-ORF2 JNC STL QL NAA+PR: NOT DETECTED
POTASSIUM BLD-SCNC: 4 MMOL/L (ref 3.4–5.3)
PROT SERPL-MCNC: 7.4 G/DL (ref 6.8–8.8)
RVA NSP5 STL QL NAA+PROBE: NOT DETECTED
SALMONELLA SP RPOD STL QL NAA+PROBE: NOT DETECTED
SHIGELLA SP+EIEC IPAH STL QL NAA+PROBE: NOT DETECTED
SODIUM SERPL-SCNC: 139 MMOL/L (ref 133–144)
TRIGL SERPL-MCNC: 227 MG/DL
TSH SERPL DL<=0.005 MIU/L-ACNC: 2.16 MU/L (ref 0.4–4)
V CHOL+PARA RFBL+TRKH+TNAA STL QL NAA+PR: NOT DETECTED
Y ENTERO RECN STL QL NAA+PROBE: NOT DETECTED

## 2021-07-23 ENCOUNTER — TELEPHONE (OUTPATIENT)
Dept: INTERNAL MEDICINE | Facility: CLINIC | Age: 86
End: 2021-07-23

## 2021-07-23 NOTE — TELEPHONE ENCOUNTER
Patient calling to have lab results from 7/15/21 mailed to her. Please review labs and mail results to patient.

## 2021-07-29 ENCOUNTER — ANCILLARY PROCEDURE (OUTPATIENT)
Dept: BONE DENSITY | Facility: CLINIC | Age: 86
End: 2021-07-29
Attending: INTERNAL MEDICINE
Payer: COMMERCIAL

## 2021-07-29 DIAGNOSIS — M85.80 OSTEOPENIA, UNSPECIFIED LOCATION: ICD-10-CM

## 2021-07-29 DIAGNOSIS — Z78.0 ASYMPTOMATIC POSTMENOPAUSAL STATUS: ICD-10-CM

## 2021-07-29 PROCEDURE — 77085 DXA BONE DENSITY AXL VRT FX: CPT | Performed by: INTERNAL MEDICINE

## 2021-11-09 ENCOUNTER — OFFICE VISIT (OUTPATIENT)
Dept: INTERNAL MEDICINE | Facility: CLINIC | Age: 86
End: 2021-11-09
Payer: COMMERCIAL

## 2021-11-09 VITALS
SYSTOLIC BLOOD PRESSURE: 132 MMHG | WEIGHT: 97.5 LBS | HEIGHT: 62 IN | BODY MASS INDEX: 17.94 KG/M2 | HEART RATE: 83 BPM | TEMPERATURE: 97.5 F | DIASTOLIC BLOOD PRESSURE: 60 MMHG | RESPIRATION RATE: 16 BRPM | OXYGEN SATURATION: 97 %

## 2021-11-09 DIAGNOSIS — M85.89 OSTEOPENIA OF MULTIPLE SITES: ICD-10-CM

## 2021-11-09 DIAGNOSIS — M79.652 PAIN OF LEFT THIGH: Primary | ICD-10-CM

## 2021-11-09 DIAGNOSIS — R42 VERTIGO: ICD-10-CM

## 2021-11-09 PROCEDURE — 99214 OFFICE O/P EST MOD 30 MIN: CPT | Performed by: INTERNAL MEDICINE

## 2021-11-09 RX ORDER — ALENDRONATE SODIUM 70 MG/1
70 TABLET ORAL
Qty: 13 TABLET | Refills: 4 | Status: SHIPPED | OUTPATIENT
Start: 2021-11-09 | End: 2022-07-19

## 2021-11-09 ASSESSMENT — MIFFLIN-ST. JEOR: SCORE: 832.34

## 2021-11-09 NOTE — PROGRESS NOTES
Dr Hood's note      Patient's instructions / PLAN:                                                        Plan:  1. Physical therapy referral.  Please call [phone] to make an appointment    2. Alendronate 70 mg once a week  3. Calcium 1200 - 1500 mg daily in divided doses  4. Vitamin D  2000 units daily - can be divided   5. If the dizziness persists schedule appointment with ENT    ENT referral   N: [facility] [phone]  [address]        ASSESSMENT & PLAN:                                                      (T85.374) Pain of left thigh  (primary encounter diagnosis)  Comment:   Plan: [facility]            (N01.82) Osteopenia of multiple sites  Comment:   We discussed about the med, how to take, the advantages and potential side effects. She will check with her dentist before starting this med, to make sure she does not have infection or contraindications. The patient will read also the info from the pharmacy and call back if questions.  We also discussed about avoiding lifting heavy weights, jumping. Weight bearing exercises are beneficial.    Plan: alendronate (FOSAMAX) 70 MG tablet            (R42) Vertigo  Comment:   Plan: Otolaryngology Referral        Encourage her for exercises        Chief complaint:                                                      Osteopenia   L leg pain      SUBJECTIVE:                                                    History of present illness:    Osteopenia w increased Fx risk  -- normal PTH  -- low Vit D    L Leg pain  -- lat part of the L thigh. Sometimes cold sensation  -- it happens few years ago and it improved with PT  -- she stopped the exercises few years ago since she was doing well  -- she resumed the exercises she remembers  -- she admits she sits a lot     Dizziness  -- for few seconds when she turns left side in bed  -- R ear w impacted wax  "    Hyperlipidemia Follow-Up      Are you regularly taking any medication or supplement to lower your cholesterol?   Yes- simvastatin    Are you having muscle aches or other side effects that you think could be caused by your cholesterol lowering medication?  No    Hypertension Follow-up      Do you check your blood pressure regularly outside of the clinic? No     Are you following a low salt diet? Yes    Are your blood pressures ever more than 140 on the top number (systolic) OR more   than 90 on the bottom number (diastolic), for example 140/90? No      How many servings of fruits and vegetables do you eat daily?  4 or more    On average, how many sweetened beverages do you drink each day (Examples: soda, juice, sweet tea, etc.  Do NOT count diet or artificially sweetened beverages)?   0    How many days per week do you exercise enough to make your heart beat faster? 3 or less    How many minutes a day do you exercise enough to make your heart beat faster? 20 - 29    How many days per week do you miss taking your medication? 0      Review of Systems:                                                      ROS: negative for fever, chills, cough, wheezes, chest pain, shortness of breath, vomiting, abdominal pain, leg swelling           OBJECTIVE:             Physical exam:  Blood pressure 132/60, pulse 83, temperature 97.5  F (36.4  C), temperature source Tympanic, resp. rate 16, height 1.57 m (5' 1.8\"), weight 44.2 kg (97 lb 8 oz), SpO2 97 %, not currently breastfeeding.     NAD, appears comfortable  Skin: no rashes   R ear with impacted wax. L ear - normal looking   Neurologic: A, Ox3, no focal signs appreciated    PMHx: reviewed  Past Medical History:   Diagnosis Date     Abnormal finding on thyroid function test 11/10/2015     Hyperlipidemia      Hypertension      Neuropathy      Subclinical hypothyroidism 11/10/2015     Thyroid nodule 11/10/2015      PSHx: reviewed  Past Surgical History:   Procedure Laterality " Date     CATARACT IOL, RT/LT       ENT SURGERY   Spring 2017    throat cyst removal     PHACOEMULSIFICATION CLEAR CORNEA WITH STANDARD INTRAOCULAR LENS IMPLANT  1/5/2012    Procedure:PHACOEMULSIFICATION CLEAR CORNEA WITH STANDARD INTRAOCULAR LENS IMPLANT; LEFT PHACOEMULSIFICATION CLEAR CORNEA WITH STANDARD LENS IMPLANT ; Surgeon:TUNDE GARDNER; [facility]     VITRECTOMY ANTERIOR          Meds: reviewed  Current Outpatient Medications   Medication Sig Dispense Refill     amLODIPine (NORVASC) 5 MG tablet TAKE 1 TAB BY MOUTH ONCE DAILY 90 tablet 1     hydrochlorothiazide (HYDRODIURIL) 12.5 MG tablet Take 1 tablet (12.5 mg) by mouth daily 30 tablet 1     losartan-hydrochlorothiazide (HYZAAR) 50-12.5 MG tablet Take 1 tablet by mouth daily 90 tablet 1     metoprolol succinate ER (TOPROL-XL) 50 MG 24 hr tablet Take 1 tablet (50 mg) by mouth 2 times daily 180 tablet 1     Multiple Vitamins-Minerals (WOMENS 50+ MULTI VITAMIN/MIN PO)        simvastatin (ZOCOR) 20 MG tablet TAKE ONE TABLET BY MOUTH ONCE DAILY AT BEDTIME 90 tablet 1       Soc Hx: reviewed  Fam Hx: reviewed          Leatha Hood MD  Internal Medicine

## 2021-11-09 NOTE — PATIENT INSTRUCTIONS
Plan:  1. Physical therapy referral.  Please call [phone] to make an appointment    2. Alendronate 70 mg once a week  3. Calcium 1200 - 1500 mg daily in divided doses  4. Vitamin D  2000 units daily - can be divided   5. If the dizziness persists schedule appointment with ENT    ENT referral   N: [facility] [phone]  [address] [place] [address]

## 2021-11-29 ENCOUNTER — THERAPY VISIT (OUTPATIENT)
Dept: PHYSICAL THERAPY | Facility: CLINIC | Age: 86
End: 2021-11-29
Attending: INTERNAL MEDICINE
Payer: COMMERCIAL

## 2021-11-29 DIAGNOSIS — M79.652 PAIN OF LEFT THIGH: ICD-10-CM

## 2021-11-29 PROCEDURE — 97110 THERAPEUTIC EXERCISES: CPT | Mod: GP | Performed by: PHYSICAL THERAPIST

## 2021-11-29 PROCEDURE — 97162 PT EVAL MOD COMPLEX 30 MIN: CPT | Mod: GP | Performed by: PHYSICAL THERAPIST

## 2021-11-29 NOTE — PROGRESS NOTES
[facility]    OUTPATIENT Physical Therapy ORTHOPEDIC EVALUATION  PLAN OF TREATMENT FOR OUTPATIENT REHABILITATION  (COMPLETE FOR INITIAL CLAIMS ONLY)  Patient's Last Name, First Name, M.I.  YOB: 1935  Vicky Kang    Provider s Name:  [facility]   Medical Record No.  [id number]   Start of Care Date:  11/29/21   Onset Date:   11/09/21   Type:     _X__PT   ___OT Medical Diagnosis:    Encounter Diagnosis   Name Primary?     Pain of left thigh         Treatment Diagnosis:  left thigh pain        Goals:     11/29/21 0500   Body Part   Goals listed below are for left thigh   Goal #1   Goal #1 ambulation   Previous Functional Level No restrictions   Current Functional Level Blocks patient can walk   Performance Level 1-2   STG Target Performance Blocks patient will be able to  walk   Performance Level 3-4   Rationale for safe outdoor household ambulation;for safe community ambulation   Due Date 12/20/21    LTG Target Performance Miles patient will be able to  walk   Performance Level 1   Rationale for safe community ambulation;to promote a healthy and active lifestyle   Due Date 01/07/22       Therapy Frequency:  1x/week  Predicted Duration of Therapy Intervention:  4 weeks    Torsten Peñaloza, REINA                 I CERTIFY THE NEED FOR THESE SERVICES FURNISHED UNDER        THIS PLAN OF TREATMENT AND WHILE UNDER MY CARE     (Physician attestation of this document indicates review and certification of the therapy plan).                       Certification Date From:  11/29/21   Certification Date To:  02/26/22    Referring Provider:  Leatha Hood-*    Initial Assessment        See Epic Evaluation SOC Date: 11/29/21

## 2021-11-29 NOTE — PROGRESS NOTES
Physical Therapy Initial Evaluation  Subjective:  Patient is referred with a 3 month hx of L anterolateral thigh pain. She also notes intermittent sensations of cold in the L thigh. Pain is intermittent and seems to be worse with prolonged standing or walking. No hx of LBP. Had similar issues in the past which responded to PT. No c/o LBP or L hip pain. Recent bone density tests revealed osteopenia and she has started taking Fosamax. She also reports some issues with vertigo, especially when lying on L side. MD visit 11/9/21.    The history is provided by the patient.   Therapist Generated HPI Evaluation         Type of problem:  Left hip (left thigh).    This is a new condition.  Condition occurred with:  Insidious onset.  Where condition occurred: for unknown reasons.  Patient reports pain:  Lateral and anterior.  Pain is described as aching and is intermittent.  Pain radiates to:  No radiation. Pain is worse during the day.  Since onset symptoms are unchanged.  Associated symptoms:  Loss of strength (cold sensations in L thigh). Symptoms are exacerbated by walking  and relieved by nothing.  Special tests included:  Bone scan.  Previous treatment includes physical therapy. There was significant improvement following previous treatment.  Barriers include:  None as reported by patient.    Patient Health History  Vicky Kang being seen for pain the thigh.          Pain is reported as 3/10 on pain scale.  General health as reported by patient is good.  Pertinent medical history includes: high blood pressure (osteopenia).   Red flags:  None as reported by patient.  Medical allergies: none.   Surgeries include:  None.    Current medications:  Bone density and high blood pressure medication.                                           Objective:  System         Lumbar/SI Evaluation  ROM:    AROM Lumbar:   Flexion:          Minor loss  Ext:                    Moderate loss   Side Bend:        Left:  Minor loss    Right:   Minor loss  Rotation:           Left:     Right:   Side Glide:        Left:     Right:           Lumbar Myotomes:  normal            Lumbar DTR's:  not assessed      Cord Signs:  not assessed    Lumbar Dermtomes:  normal                Neural Tension/Mobility:  Lumbar:  Normal        Lumbar Palpation:  normal          Spinal Segmental Conclusions: Normal age related degenerative changes in lumbar spine.                                            Hip Evaluation  Hip PROM:  Hip PROM:  Left Hip:    Normal  Right Hip:  Normal                          Hip Strength:    Flexion:   Left: 5/5   Pain:  Right: 5/5   Pain:                    Extension:  Left: 5/5  Pain:Right: 5/5    Pain:    Abduction:  Left: 4/5     Pain:Right: 5/5    Pain:  Adduction:  Left: 5/5    Pain:Right: 5/5   Pain:                  Hip Palpation:    Left hip tenderness present at:   Greater Trachanter    Functional Testing:  not assessed                       General     ROS    Assessment/Plan:    Patient is a 86 year old female with lumbar and left side hip complaints.    Patient has the following significant findings with corresponding treatment plan.                Diagnosis 1:  Left thigh pain  Pain -  self management, education, directional preference exercise and home program  Decreased strength - therapeutic exercise, therapeutic activities and home program  Decreased proprioception - neuro re-education, therapeutic activities and home program  Impaired gait - gait training and home program  Impaired muscle performance - neuro re-education and home program  Decreased function - therapeutic activities and home program    Therapy Evaluation Codes:   1) History comprised of:   Personal factors that impact the plan of care:      Age and Time since onset of symptoms.    Comorbidity factors that impact the plan of care are:      osteopenia.     Medications impacting care: Bone density and High blood pressure.  2) Examination of Body Systems comprised  of:   Body structures and functions that impact the plan of care:      Hip and Lumbar spine.   Activity limitations that impact the plan of care are:      Standing and Walking.  3) Clinical presentation characteristics are:   Evolving/Changing.  4) Decision-Making    Moderate complexity using standardized patient assessment instrument and/or measureable assessment of functional outcome.  Cumulative Therapy Evaluation is: Moderate complexity.    Previous and current functional limitations:  (See Goal Flow Sheet for this information)    Short term and Long term goals: (See Goal Flow Sheet for this information)     Communication ability:  Patient appears to be able to clearly communicate and understand verbal and written communication and follow directions correctly.  Treatment Explanation - The following has been discussed with the patient:   RX ordered/plan of care  Anticipated outcomes  Possible risks and side effects  This patient would benefit from PT intervention to resume normal activities.   Rehab potential is good.    Frequency:  1 X week, once daily  Duration:  for 4 weeks  Discharge Plan:  Achieve all LTG.  Independent in home treatment program.  Reach maximal therapeutic benefit.    Please refer to the daily flowsheet for treatment today, total treatment time and time spent performing 1:1 timed codes.

## 2021-12-09 ENCOUNTER — THERAPY VISIT (OUTPATIENT)
Dept: PHYSICAL THERAPY | Facility: CLINIC | Age: 86
End: 2021-12-09
Payer: COMMERCIAL

## 2021-12-09 DIAGNOSIS — M79.652 PAIN OF LEFT THIGH: Primary | ICD-10-CM

## 2021-12-09 PROCEDURE — 97110 THERAPEUTIC EXERCISES: CPT | Mod: GP | Performed by: PHYSICAL THERAPIST

## 2021-12-09 PROCEDURE — 97112 NEUROMUSCULAR REEDUCATION: CPT | Mod: GP | Performed by: PHYSICAL THERAPIST

## 2021-12-13 ENCOUNTER — THERAPY VISIT (OUTPATIENT)
Dept: PHYSICAL THERAPY | Facility: CLINIC | Age: 86
End: 2021-12-13
Attending: INTERNAL MEDICINE
Payer: COMMERCIAL

## 2021-12-13 DIAGNOSIS — M79.652 PAIN OF LEFT THIGH: Primary | ICD-10-CM

## 2021-12-13 PROCEDURE — 97110 THERAPEUTIC EXERCISES: CPT | Mod: GP | Performed by: PHYSICAL THERAPIST

## 2021-12-31 ENCOUNTER — THERAPY VISIT (OUTPATIENT)
Dept: PHYSICAL THERAPY | Facility: CLINIC | Age: 86
End: 2021-12-31
Payer: COMMERCIAL

## 2021-12-31 DIAGNOSIS — M79.652 PAIN OF LEFT THIGH: Primary | ICD-10-CM

## 2021-12-31 PROCEDURE — 97112 NEUROMUSCULAR REEDUCATION: CPT | Mod: GP | Performed by: PHYSICAL THERAPIST

## 2021-12-31 PROCEDURE — 97110 THERAPEUTIC EXERCISES: CPT | Mod: GP | Performed by: PHYSICAL THERAPIST

## 2021-12-31 NOTE — PROGRESS NOTES
"Subjective:  HPI  Physical Exam                    Objective:  System    Physical Exam    General     ROS    Assessment/Plan:    PROGRESS  REPORT    Progress reporting period is from 12/31/2021.       SUBJECTIVE   Subjective: Really no change since last visit. Gotten up to 30 reps for flexion/abd exercises. Cold feeling doesn't come as much - but it's still there. Did have one day of flared up back pain when she walked for 2-3 hours at [facility].     Current Pain level: 0/10.     Initial Pain level: 3/10.   Changes in function:  None  Adverse reaction to treatment or activity: None    OBJECTIVE  Changes noted in objective findings:  None  Objective: Challenged with SLS -- had LOB in < 5 seconds. Added sit to stand and balance exercises to HEP. Tolerated strength exercises and nustep well.     ASSESSMENT/PLAN  Updated problem list and treatment plan: Diagnosis 1:  L thigh pain  Pain -  home program  STG/LTGs have been met or progress has been made towards goals:  None  Assessment of Progress: The patient's condition is unchanged.  Self Management Plans:  Patient has been instructed in a home treatment program.  I have re-evaluated this patient and find that the nature, scope, duration and intensity of the therapy is appropriate for the medical condition of the patient.  Vicky continues to require the following intervention to meet STG and LTG's:  PT intervention is no longer required to meet STG/LTG.    Recommendations:  This patient would benefit from further evaluation. Overall pt's vague left pain has not improved with 4 visit of PT. The thigh pain is very intermittent (can sometimes be accompanied by a \"cold\" sensation in the leg/thigh). PT unable to reproduce her pain in the thigh. At this point, I recommended she follow up with primary care for further evaluation (potentially imaging as next step).    Please refer to the daily flowsheet for treatment today, total treatment time and time spent performing 1:1 timed " codes.

## 2022-02-04 NOTE — PROGRESS NOTES
Patient did not return for further treatment and no additional progress was noted.  Please refer to the progress note and goal flowsheet completed on 12/31/2021  for discharge information.

## 2022-02-16 ENCOUNTER — TELEPHONE (OUTPATIENT)
Dept: INTERNAL MEDICINE | Facility: CLINIC | Age: 87
End: 2022-02-16
Payer: COMMERCIAL

## 2022-02-16 DIAGNOSIS — M25.552 HIP PAIN, LEFT: Primary | ICD-10-CM

## 2022-02-16 NOTE — TELEPHONE ENCOUNTER
Patient advised, was given phone number of ortho  and then transferred to scheduling to make xray appt. NATALIE Guerrier R.N.

## 2022-02-16 NOTE — TELEPHONE ENCOUNTER
Patient states she was seen in Nov for left thigh pain. She completed 4 sessions of PHYSICAL THERAPY but states the pain has increased and is now every day where as prior to PHYSICAL THERAPY it was intermittent.     Patient stated the PHYSICAL THERAPIST thought she may need xrays at this point.     Does she need an appointment or can a ref to radiology be placed?    Best number to call  [phone]

## 2022-02-18 ENCOUNTER — HOSPITAL ENCOUNTER (OUTPATIENT)
Dept: GENERAL RADIOLOGY | Facility: CLINIC | Age: 87
Discharge: HOME | End: 2022-02-18
Attending: INTERNAL MEDICINE | Admitting: INTERNAL MEDICINE
Payer: COMMERCIAL

## 2022-02-18 DIAGNOSIS — M25.552 HIP PAIN, LEFT: ICD-10-CM

## 2022-02-18 PROCEDURE — 73502 X-RAY EXAM HIP UNI 2-3 VIEWS: CPT

## 2022-02-23 ENCOUNTER — OFFICE VISIT (OUTPATIENT)
Dept: ORTHOPEDICS | Facility: CLINIC | Age: 87
End: 2022-02-23
Attending: INTERNAL MEDICINE
Payer: COMMERCIAL

## 2022-02-23 VITALS — DIASTOLIC BLOOD PRESSURE: 58 MMHG | SYSTOLIC BLOOD PRESSURE: 126 MMHG

## 2022-02-23 DIAGNOSIS — M25.552 PAIN IN JOINT INVOLVING LEFT PELVIC REGION AND THIGH: Primary | ICD-10-CM

## 2022-02-23 PROCEDURE — 99203 OFFICE O/P NEW LOW 30 MIN: CPT | Performed by: ORTHOPAEDIC SURGERY

## 2022-02-23 ASSESSMENT — ACTIVITIES OF DAILY LIVING (ADL)
ADL_COUNT: 17
ADL_SUM: 1
ADL_SUBSCALE_SCORE: 98.5
ADL_MEAN: .06

## 2022-02-23 ASSESSMENT — HOOS S4: HOW SEVERE IS YOUR HIP JOINT STIFFNESS AFTER FIRST WAKENING IN THE MORNING?: MILD

## 2022-02-23 NOTE — LETTER
2/23/2022         RE: Vicky Kang  [address] [facility] [address]        Dear Colleague,    Thank you for referring your patient, Vicky Kang, to the [facility] [place]. Please see a copy of my visit note below.    HISTORY OF PRESENT ILLNESS:    Vicky Kang is a 87 year old female who is seen in consultation at the request of Dr. Conroy for left thigh pain. Onset of pain chronic intermittent pain, once monthly for ~2 years. She reports more consistent pain over the last ~3 months.  She denies injury or trauma.     Present symptoms: Left anterior thigh pain. She reports at times the discomfort feels like pressure, occasionally sharp in nature. No weakness of the left leg. She reports with recent Physical Therapy her strength was comparable bilaterally.  No specific activities cause more pain.   Treatments tried to this point: physical therapy, no use of heat or ice, no use of OTC pain relievers or NSAIDS   Orthopedic PMH:  none     Past Medical History:   Diagnosis Date     Abnormal finding on thyroid function test 11/10/2015     Hyperlipidemia      Hypertension      Neuropathy      Subclinical hypothyroidism 11/10/2015     Thyroid nodule 11/10/2015       Past Surgical History:   Procedure Laterality Date     CATARACT IOL, RT/LT       ENT SURGERY   Spring 2017    throat cyst removal     PHACOEMULSIFICATION CLEAR CORNEA WITH STANDARD INTRAOCULAR LENS IMPLANT  1/5/2012    Procedure:PHACOEMULSIFICATION CLEAR CORNEA WITH STANDARD INTRAOCULAR LENS IMPLANT; LEFT PHACOEMULSIFICATION CLEAR CORNEA WITH STANDARD LENS IMPLANT ; Surgeon:TUNDE GARDNER; Location: [facility]     VITRECTOMY ANTERIOR         Family History   Problem Relation Age of Onset     Cancer Mother      Family History Negative Father        Social History     Socioeconomic History     Marital status:      Spouse name: Not on file     Number of children: Not on file     Years of education: Not on  file     Highest education level: Not on file   Occupational History     Not on file   Tobacco Use     Smoking status: Never Smoker     Smokeless tobacco: Never Used   Vaping Use     Vaping Use: Never used   Substance and Sexual Activity     Alcohol use: No     Alcohol/week: 0.0 standard drinks     Drug use: No     Sexual activity: Not Currently     Partners: Male   Other Topics Concern     Parent/sibling w/ CABG, MI or angioplasty before 65F 55M? Not Asked      Service Not Asked     Blood Transfusions Not Asked     Caffeine Concern No     Comment: a couple iced teas daily     Occupational Exposure Not Asked     Hobby Hazards Not Asked     Sleep Concern Not Asked     Stress Concern Not Asked     Weight Concern Not Asked     Special Diet No     Back Care Not Asked     Exercise No     Comment: none recently     Bike Helmet Not Asked     Seat Belt Not Asked     Self-Exams Not Asked   Social History Narrative     Not on file     Social Determinants of Health     Financial Resource Strain: Not on file   Food Insecurity: Not on file   Transportation Needs: Not on file   Physical Activity: Not on file   Stress: Not on file   Social Connections: Not on file   Intimate Partner Violence: Not on file   Housing Stability: Not on file       Current Outpatient Medications   Medication Sig Dispense Refill     alendronate (FOSAMAX) 70 MG tablet Take 1 tablet (70 mg) by mouth every 7 days 13 tablet 4     amLODIPine (NORVASC) 5 MG tablet TAKE 1 TAB BY MOUTH ONCE DAILY 90 tablet 1     hydrochlorothiazide (HYDRODIURIL) 12.5 MG tablet Take 1 tablet (12.5 mg) by mouth daily 30 tablet 1     losartan-hydrochlorothiazide (HYZAAR) 50-12.5 MG tablet Take 1 tablet by mouth daily 90 tablet 1     metoprolol succinate ER (TOPROL-XL) 50 MG 24 hr tablet Take 1 tablet (50 mg) by mouth 2 times daily 180 tablet 1     Multiple Vitamins-Minerals (WOMENS 50+ MULTI VITAMIN/MIN PO)        simvastatin (ZOCOR) 20 MG tablet TAKE ONE TABLET BY MOUTH  ONCE DAILY AT BEDTIME 90 tablet 1       Allergies   Allergen Reactions     Seasonal Allergies        REVIEW OF SYSTEMS:  CONSTITUTIONAL:  NEGATIVE for fever, chills, change in weight  INTEGUMENTARY/SKIN:  NEGATIVE for worrisome rashes, moles or lesions  EYES:  NEGATIVE for vision changes or irritation  ENT/MOUTH:  NEGATIVE for ear, mouth and throat problems  RESP:  NEGATIVE for significant cough or SOB  BREAST:  NEGATIVE for masses, tenderness or discharge  CV:  Hypertension   GI:  Diarrhea   :  Negative   MUSCULOSKELETAL:  See HPI above  NEURO:  NEGATIVE for weakness, dizziness or paresthesias  ENDOCRINE:  NEGATIVE for temperature intolerance, skin/hair changes  HEME/ALLERGY/IMMUNE:  NEGATIVE for bleeding problems  PSYCHIATRIC:  NEGATIVE for changes in mood or affect      PHYSICAL EXAM:  /58 (BP Location: Right arm, Patient Position: Chair, Cuff Size: Adult Regular)   LMP  (LMP Unknown)   There is no height or weight on file to calculate BMI.   GENERAL APPEARANCE: healthy, alert and no distress   HEENT: No apparent thyroid megaly. Clear sclera with normal ocular movement  RESPIRATORY: No labored breathing  SKIN: no suspicious lesions or rashes  NEURO: Normal strength and tone, mentation intact and speech normal  VASCULAR: Good pulses, and capillary refill   LYMPH: no lymphadenopathy   PSYCH:  mentation appears normal and affect normal/bright    MUSCULOSKELETAL:  Iron he manages to be honest he will of the chronic nature is discharged from [facility] literature below.  Swollen at night hospital diuresis check echo DVT     ASSESSMENT:   not in acute distress  Able to get up from sitting quite readily  She is able to walk without any limp    In terms of motor strength, she has a full motor function including partial squatting of 1 leg at a time  No muscle atrophy in the thigh regions    Both hips with full range of motion  No crepitus with a hip range of motion  No pain with flexion, internal rotation or  external rotation of the hips  Straight leg raising is negative other than tightness in the hamstrings  Femoral stretch test is negative other than tightness in the quadriceps    With the knee flexion extension, more noticeable crepitus at the patellofemoral joint of the left knee compared to the right is noted.  She has mild tenderness at the left knee patellofemoral joint.  Medial and lateral joint lines are nontender.    Greater trochanteric region is not tender, bilateral  Distal circulation is intact  Calf is not swollen    PLAN:  Chronic intermittent thigh pain, exact etiology unclear  Despite presence of lumbar DJD, clinically, there is no evidence of  upper lumbar spine neuropathy responsible for her symptoms.  Based on the x-rays of February 18, 2022 that were reviewed and visualized, there is no evidence that hip arthritis is responsible for her symptoms either.    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  Even though is not necessary at this point, in the future, if it gets worse, for diagnostic and therapeutic purposes, a cortisone injection to the left knee would be reasonable.      Otherwise, further observation is recommended for the time being.    All the questions were answered.    Imaging Interpretation:     Recent Results (from the past 744 hour(s))   XR Pelvis w Hip Left 1 View    Narrative    XR PELVIS AND HIP LEFT 1 VIEW 2/18/2022 10:34 AM     HISTORY: Hip pain, left    COMPARISON: None.      Impression    IMPRESSION: Osteopenia. No fractures are evident. Mild degenerative  changes of both hip joints. Degenerative changes in the SI joints and  lower lumbar spine.    KELSI MARIANO MD         SYSTEM ID:  [id number]           Apolinar Porter MD  Department of Orthopedic Surgery        Disclaimer: This note consists of symbols derived from keyboarding, dictation and/or voice recognition software. As a result, there may be errors in the script that have gone  undetected. Please consider this when interpreting information found in this chart.        Again, thank you for allowing me to participate in the care of your patient.        Sincerely,        Apolinar Porter MD

## 2022-02-23 NOTE — PATIENT INSTRUCTIONS
There is observation only  If the symptoms worsen in the future, will consider a cortisone injection to the left knee for diagnostic as well as therapeutic purposes.  Follow-up as needed

## 2022-02-23 NOTE — PROGRESS NOTES
HISTORY OF PRESENT ILLNESS:    Vicky Kang is a 87 year old female who is seen in consultation at the request of Dr. Conroy for left thigh pain. Onset of pain chronic intermittent pain, once monthly for ~2 years. She reports more consistent pain over the last ~3 months.  She denies injury or trauma.     Present symptoms: Left anterior thigh pain. She reports at times the discomfort feels like pressure, occasionally sharp in nature. No weakness of the left leg. She reports with recent Physical Therapy her strength was comparable bilaterally.  No specific activities cause more pain.   Treatments tried to this point: physical therapy, no use of heat or ice, no use of OTC pain relievers or NSAIDS   Orthopedic PMH:  none     Past Medical History:   Diagnosis Date     Abnormal finding on thyroid function test 11/10/2015     Hyperlipidemia      Hypertension      Neuropathy      Subclinical hypothyroidism 11/10/2015     Thyroid nodule 11/10/2015       Past Surgical History:   Procedure Laterality Date     CATARACT IOL, RT/LT       ENT SURGERY   Spring 2017    throat cyst removal     PHACOEMULSIFICATION CLEAR CORNEA WITH STANDARD INTRAOCULAR LENS IMPLANT  1/5/2012    Procedure:PHACOEMULSIFICATION CLEAR CORNEA WITH STANDARD INTRAOCULAR LENS IMPLANT; LEFT PHACOEMULSIFICATION CLEAR CORNEA WITH STANDARD LENS IMPLANT ; Surgeon:TUNDE GARDNER; Location: [facility]     VITRECTOMY ANTERIOR         Family History   Problem Relation Age of Onset     Cancer Mother      Family History Negative Father        Social History     Socioeconomic History     Marital status:      Spouse name: Not on file     Number of children: Not on file     Years of education: Not on file     Highest education level: Not on file   Occupational History     Not on file   Tobacco Use     Smoking status: Never Smoker     Smokeless tobacco: Never Used   Vaping Use     Vaping Use: Never used   Substance and Sexual Activity     Alcohol use: No      Alcohol/week: 0.0 standard drinks     Drug use: No     Sexual activity: Not Currently     Partners: Male   Other Topics Concern     Parent/sibling w/ CABG, MI or angioplasty before 65F 55M? Not Asked      Service Not Asked     Blood Transfusions Not Asked     Caffeine Concern No     Comment: a couple iced teas daily     Occupational Exposure Not Asked     Hobby Hazards Not Asked     Sleep Concern Not Asked     Stress Concern Not Asked     Weight Concern Not Asked     Special Diet No     Back Care Not Asked     Exercise No     Comment: none recently     Bike Helmet Not Asked     Seat Belt Not Asked     Self-Exams Not Asked   Social History Narrative     Not on file     Social Determinants of Health     Financial Resource Strain: Not on file   Food Insecurity: Not on file   Transportation Needs: Not on file   Physical Activity: Not on file   Stress: Not on file   Social Connections: Not on file   Intimate Partner Violence: Not on file   Housing Stability: Not on file       Current Outpatient Medications   Medication Sig Dispense Refill     alendronate (FOSAMAX) 70 MG tablet Take 1 tablet (70 mg) by mouth every 7 days 13 tablet 4     amLODIPine (NORVASC) 5 MG tablet TAKE 1 TAB BY MOUTH ONCE DAILY 90 tablet 1     hydrochlorothiazide (HYDRODIURIL) 12.5 MG tablet Take 1 tablet (12.5 mg) by mouth daily 30 tablet 1     losartan-hydrochlorothiazide (HYZAAR) 50-12.5 MG tablet Take 1 tablet by mouth daily 90 tablet 1     metoprolol succinate ER (TOPROL-XL) 50 MG 24 hr tablet Take 1 tablet (50 mg) by mouth 2 times daily 180 tablet 1     Multiple Vitamins-Minerals (WOMENS 50+ MULTI VITAMIN/MIN PO)        simvastatin (ZOCOR) 20 MG tablet TAKE ONE TABLET BY MOUTH ONCE DAILY AT BEDTIME 90 tablet 1       Allergies   Allergen Reactions     Seasonal Allergies        REVIEW OF SYSTEMS:  CONSTITUTIONAL:  NEGATIVE for fever, chills, change in weight  INTEGUMENTARY/SKIN:  NEGATIVE for worrisome rashes, moles or lesions  EYES:   NEGATIVE for vision changes or irritation  ENT/MOUTH:  NEGATIVE for ear, mouth and throat problems  RESP:  NEGATIVE for significant cough or SOB  BREAST:  NEGATIVE for masses, tenderness or discharge  CV:  Hypertension   GI:  Diarrhea   :  Negative   MUSCULOSKELETAL:  See HPI above  NEURO:  NEGATIVE for weakness, dizziness or paresthesias  ENDOCRINE:  NEGATIVE for temperature intolerance, skin/hair changes  HEME/ALLERGY/IMMUNE:  NEGATIVE for bleeding problems  PSYCHIATRIC:  NEGATIVE for changes in mood or affect      PHYSICAL EXAM:  /58 (BP Location: Right arm, Patient Position: Chair, Cuff Size: Adult Regular)   LMP  (LMP Unknown)   There is no height or weight on file to calculate BMI.   GENERAL APPEARANCE: healthy, alert and no distress   HEENT: No apparent thyroid megaly. Clear sclera with normal ocular movement  RESPIRATORY: No labored breathing  SKIN: no suspicious lesions or rashes  NEURO: Normal strength and tone, mentation intact and speech normal  VASCULAR: Good pulses, and capillary refill   LYMPH: no lymphadenopathy   PSYCH:  mentation appears normal and affect normal/bright    MUSCULOSKELETAL:  Iron he manages to be honest he will of the chronic nature is discharged from [facility] literature below.  Swollen at night hospital diuresis check echo DVT     ASSESSMENT:   not in acute distress  Able to get up from sitting quite readily  She is able to walk without any limp    In terms of motor strength, she has a full motor function including partial squatting of 1 leg at a time  No muscle atrophy in the thigh regions    Both hips with full range of motion  No crepitus with a hip range of motion  No pain with flexion, internal rotation or external rotation of the hips  Straight leg raising is negative other than tightness in the hamstrings  Femoral stretch test is negative other than tightness in the quadriceps    With the knee flexion extension, more noticeable crepitus at the patellofemoral joint of  the left knee compared to the right is noted.  She has mild tenderness at the left knee patellofemoral joint.  Medial and lateral joint lines are nontender.    Greater trochanteric region is not tender, bilateral  Distal circulation is intact  Calf is not swollen    PLAN:  Chronic intermittent thigh pain, exact etiology unclear  Despite presence of lumbar DJD, clinically, there is no evidence of  upper lumbar spine neuropathy responsible for her symptoms.  Based on the x-rays of February 18, 2022 that were reviewed and visualized, there is no evidence that hip arthritis is responsible for her symptoms either.    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  Even though is not necessary at this point, in the future, if it gets worse, for diagnostic and therapeutic purposes, a cortisone injection to the left knee would be reasonable.      Otherwise, further observation is recommended for the time being.    All the questions were answered.    Imaging Interpretation:     Recent Results (from the past 744 hour(s))   XR Pelvis w Hip Left 1 View    Narrative    XR PELVIS AND HIP LEFT 1 VIEW 2/18/2022 10:34 AM     HISTORY: Hip pain, left    COMPARISON: None.      Impression    IMPRESSION: Osteopenia. No fractures are evident. Mild degenerative  changes of both hip joints. Degenerative changes in the SI joints and  lower lumbar spine.    KELSI MARIANO MD         SYSTEM ID:  [id number]           Apolinar Porter MD  Department of Orthopedic Surgery        Disclaimer: This note consists of symbols derived from keyboarding, dictation and/or voice recognition software. As a result, there may be errors in the script that have gone undetected. Please consider this when interpreting information found in this chart.

## 2022-02-28 DIAGNOSIS — I10 BENIGN HYPERTENSION: ICD-10-CM

## 2022-03-01 RX ORDER — LOSARTAN POTASSIUM AND HYDROCHLOROTHIAZIDE 12.5; 5 MG/1; MG/1
TABLET ORAL
Qty: 90 TABLET | Refills: 1 | Status: SHIPPED | OUTPATIENT
Start: 2022-03-01 | End: 2022-07-19

## 2022-03-01 NOTE — TELEPHONE ENCOUNTER
Prescription approved per [facility] Refill Protocol.  Leeanna ISRAEL RN, BSN

## 2022-03-02 ENCOUNTER — TELEPHONE (OUTPATIENT)
Dept: INTERNAL MEDICINE | Facility: CLINIC | Age: 87
End: 2022-03-02
Payer: COMMERCIAL

## 2022-03-02 NOTE — TELEPHONE ENCOUNTER
Received fax from [facility] stating patient requesting prescription for Lisinopril. No previous history of patient receiving Lisinopril. Called [facility] and spoke to Norma, she states patient requested this and they also do not see a history of her taking Lisinopril in the past. They did receive new prescription sent for Losartan-hydrochlorothiazide yesterday. This RN will follow-up with patient.     Called patient, she states that she had wanted refill for Losartan-hydrochlorothiazide, not Lisinopril. Informed patient prescription sent to pharmacy yesterday for Losartan-hydrochlorothiazide.     Kavitha Anderson RN  [facility]

## 2022-06-02 DIAGNOSIS — E78.5 HYPERLIPIDEMIA LDL GOAL <130: ICD-10-CM

## 2022-06-02 DIAGNOSIS — I10 BENIGN HYPERTENSION: ICD-10-CM

## 2022-06-06 RX ORDER — AMLODIPINE BESYLATE 5 MG/1
TABLET ORAL
Qty: 30 TABLET | Refills: 1 | Status: SHIPPED | OUTPATIENT
Start: 2022-06-06 | End: 2022-07-06

## 2022-06-06 RX ORDER — SIMVASTATIN 20 MG
TABLET ORAL
Qty: 30 TABLET | Refills: 1 | Status: SHIPPED | OUTPATIENT
Start: 2022-06-06 | End: 2022-07-06

## 2022-07-19 ENCOUNTER — OFFICE VISIT (OUTPATIENT)
Dept: INTERNAL MEDICINE | Facility: CLINIC | Age: 87
End: 2022-07-19
Payer: COMMERCIAL

## 2022-07-19 VITALS
BODY MASS INDEX: 24.07 KG/M2 | RESPIRATION RATE: 18 BRPM | DIASTOLIC BLOOD PRESSURE: 71 MMHG | TEMPERATURE: 97.4 F | WEIGHT: 130.8 LBS | SYSTOLIC BLOOD PRESSURE: 149 MMHG | OXYGEN SATURATION: 94 % | HEART RATE: 88 BPM | HEIGHT: 62 IN

## 2022-07-19 DIAGNOSIS — M79.652 PAIN OF LEFT THIGH: ICD-10-CM

## 2022-07-19 DIAGNOSIS — R60.0 BILATERAL LEG EDEMA: ICD-10-CM

## 2022-07-19 DIAGNOSIS — E55.9 VITAMIN D DEFICIENCY: ICD-10-CM

## 2022-07-19 DIAGNOSIS — E78.5 HYPERLIPIDEMIA LDL GOAL <130: ICD-10-CM

## 2022-07-19 DIAGNOSIS — M85.89 OSTEOPENIA OF MULTIPLE SITES: ICD-10-CM

## 2022-07-19 DIAGNOSIS — Z00.00 ROUTINE GENERAL MEDICAL EXAMINATION AT A HEALTH CARE FACILITY: Primary | ICD-10-CM

## 2022-07-19 DIAGNOSIS — I10 BENIGN HYPERTENSION: ICD-10-CM

## 2022-07-19 LAB
ERYTHROCYTE [DISTWIDTH] IN BLOOD BY AUTOMATED COUNT: 12 % (ref 10–15)
HCT VFR BLD AUTO: 43.4 % (ref 35–47)
HGB BLD-MCNC: 14.7 G/DL (ref 11.7–15.7)
MCH RBC QN AUTO: 31.7 PG (ref 26.5–33)
MCHC RBC AUTO-ENTMCNC: 33.9 G/DL (ref 31.5–36.5)
MCV RBC AUTO: 94 FL (ref 78–100)
PLATELET # BLD AUTO: 310 10E3/UL (ref 150–450)
RBC # BLD AUTO: 4.64 10E6/UL (ref 3.8–5.2)
WBC # BLD AUTO: 7.5 10E3/UL (ref 4–11)

## 2022-07-19 PROCEDURE — 99214 OFFICE O/P EST MOD 30 MIN: CPT | Mod: 25 | Performed by: INTERNAL MEDICINE

## 2022-07-19 PROCEDURE — 80053 COMPREHEN METABOLIC PANEL: CPT | Performed by: INTERNAL MEDICINE

## 2022-07-19 PROCEDURE — 82306 VITAMIN D 25 HYDROXY: CPT | Performed by: INTERNAL MEDICINE

## 2022-07-19 PROCEDURE — 80061 LIPID PANEL: CPT | Performed by: INTERNAL MEDICINE

## 2022-07-19 PROCEDURE — G0439 PPPS, SUBSEQ VISIT: HCPCS | Performed by: INTERNAL MEDICINE

## 2022-07-19 PROCEDURE — 85027 COMPLETE CBC AUTOMATED: CPT | Performed by: INTERNAL MEDICINE

## 2022-07-19 PROCEDURE — 84443 ASSAY THYROID STIM HORMONE: CPT | Performed by: INTERNAL MEDICINE

## 2022-07-19 PROCEDURE — 36415 COLL VENOUS BLD VENIPUNCTURE: CPT | Performed by: INTERNAL MEDICINE

## 2022-07-19 PROCEDURE — 82043 UR ALBUMIN QUANTITATIVE: CPT | Performed by: INTERNAL MEDICINE

## 2022-07-19 RX ORDER — LOSARTAN POTASSIUM AND HYDROCHLOROTHIAZIDE 12.5; 5 MG/1; MG/1
1 TABLET ORAL DAILY
Qty: 90 TABLET | Refills: 4 | Status: SHIPPED | OUTPATIENT
Start: 2022-07-19 | End: 2023-07-21

## 2022-07-19 RX ORDER — METOPROLOL SUCCINATE 50 MG/1
50 TABLET, EXTENDED RELEASE ORAL 2 TIMES DAILY
Qty: 180 TABLET | Refills: 4 | Status: SHIPPED | OUTPATIENT
Start: 2022-07-19 | End: 2023-07-21

## 2022-07-19 RX ORDER — ALENDRONATE SODIUM 70 MG/1
70 TABLET ORAL
Qty: 13 TABLET | Refills: 4 | Status: SHIPPED | OUTPATIENT
Start: 2022-07-19 | End: 2023-02-21

## 2022-07-19 RX ORDER — GABAPENTIN 100 MG/1
100 CAPSULE ORAL 3 TIMES DAILY
Qty: 30 CAPSULE | Refills: 1 | Status: SHIPPED | OUTPATIENT
Start: 2022-07-19 | End: 2023-02-21

## 2022-07-19 RX ORDER — AMLODIPINE BESYLATE 5 MG/1
TABLET ORAL
Qty: 90 TABLET | Refills: 4 | Status: SHIPPED | OUTPATIENT
Start: 2022-07-19 | End: 2023-07-21

## 2022-07-19 RX ORDER — PRAVASTATIN SODIUM 20 MG
20 TABLET ORAL DAILY
Qty: 90 TABLET | Refills: 4 | Status: SHIPPED | OUTPATIENT
Start: 2022-07-19 | End: 2023-07-21

## 2022-07-19 ASSESSMENT — ENCOUNTER SYMPTOMS
PARESTHESIAS: 0
HEADACHES: 0
NAUSEA: 0
PALPITATIONS: 0
DYSURIA: 0
FREQUENCY: 0
BREAST MASS: 0
WEAKNESS: 0
COUGH: 0
FEVER: 0
DIARRHEA: 0
CHILLS: 0
HEMATOCHEZIA: 0
JOINT SWELLING: 0
ARTHRALGIAS: 1
NERVOUS/ANXIOUS: 0
HEARTBURN: 0
HEMATURIA: 0
EYE PAIN: 0
DIZZINESS: 1
CONSTIPATION: 0
SORE THROAT: 0
MYALGIAS: 0
ABDOMINAL PAIN: 0
SHORTNESS OF BREATH: 0

## 2022-07-19 ASSESSMENT — ACTIVITIES OF DAILY LIVING (ADL): CURRENT_FUNCTION: NO ASSISTANCE NEEDED

## 2022-07-19 NOTE — PROGRESS NOTES
Dr Hood's note    Patient's instructions / PLAN:                                                        Plan:  1.  Labs today - [address]   2. Stop simvastatin  3. Start Pravastatin 20 mg daily at bed time  4. Continue the other meds, same doses for now.  5. Gabapentin 100 mg daily ( at bed time) as needed for intense L thigh pain   6. The following vaccines are recommended for you. Please check with your insurance about coverage.  Some insurances cover better if you have these vaccines at the pharmacy:  -- Tetanus vaccine -- Feb 2023        ASSESSMENT & PLAN:                                                      (Z00.00) Routine general medical examination at a health care facility  (primary encounter diagnosis)  Comment:   Plan: Vitamin D Deficiency, Lipid panel reflex to         direct LDL Fasting, Comprehensive metabolic         panel, CBC with platelets, TSH with free T4         reflex, Albumin Random Urine Quantitative with         Creat Ratio            (M85.89) Osteopenia -- Fosamax started 2021  Comment:   Plan: alendronate (FOSAMAX) 70 MG tablet, Vitamin D         Deficiency, Lipid panel reflex to direct LDL         Fasting, Comprehensive metabolic panel, CBC         with platelets, TSH with free T4 reflex,         Albumin Random Urine Quantitative with Creat         Ratio            (I10) Benign hypertension  Comment: Controlled    Plan: amLODIPine (NORVASC) 5 MG tablet,         losartan-hydrochlorothiazide (HYZAAR) 50-12.5         MG tablet, metoprolol succinate ER (TOPROL XL)         50 MG 24 hr tablet, Vitamin D Deficiency, Lipid        panel reflex to direct LDL Fasting,         Comprehensive metabolic panel, CBC with         platelets, TSH with free T4 reflex, Albumin         Random Urine Quantitative with Creat Ratio            (R60.0) Bilateral leg edema  Comment: Controlled    Plan: Vitamin D Deficiency, Lipid panel reflex to         direct LDL Fasting, Comprehensive metabolic         panel, CBC  with platelets, TSH with free T4         reflex, Albumin Random Urine Quantitative with         Creat Ratio            (E78.5) Hyperlipidemia LDL goal <130  Comment: Controlled    Plan: pravastatin (PRAVACHOL) 20 MG tablet, Vitamin D        Deficiency, Lipid panel reflex to direct LDL         Fasting, Comprehensive metabolic panel, CBC         with platelets, TSH with free T4 reflex,         Albumin Random Urine Quantitative with Creat         Ratio            (E55.9) Vitamin D deficiency  Comment:   Plan: Vitamin D Deficiency            (M79.652) Pain of left thigh  Comment: likely meralgia paresthetica  We discussed about the new meds, advantages and potential side effects. The patient will read also the info from the pharmacy and call back if questions.   Plan: gabapentin (NEURONTIN) 100 MG capsule PRN         Chief Complaint:                                                        Annual exam  Follow up chronic medical problems      SUBJECTIVE:                                                    History of present illness     We reviewed the chronic medical problems as above.   I reviewed the recent tests results in Epic       HTN:  -- home -130/60    Diarrhea improved after gluten free diet   ROS:     See below        PMHx: - reviewed  Past Medical History:   Diagnosis Date     Abnormal finding on thyroid function test 11/10/2015     Hyperlipidemia      Hypertension      Neuropathy      Subclinical hypothyroidism 11/10/2015     Thyroid nodule 11/10/2015       PSHx: reviewed  Past Surgical History:   Procedure Laterality Date     CATARACT IOL, RT/LT       ENT SURGERY   Spring 2017    throat cyst removal     PHACOEMULSIFICATION CLEAR CORNEA WITH STANDARD INTRAOCULAR LENS IMPLANT  1/5/2012    Procedure:PHACOEMULSIFICATION CLEAR CORNEA WITH STANDARD INTRAOCULAR LENS IMPLANT; LEFT PHACOEMULSIFICATION CLEAR CORNEA WITH STANDARD LENS IMPLANT ; Surgeon:TUNDE GARDNER; Location:[facility]     VITRECTOMY ANTERIOR           Soc Hx: No daily alcohol, no smoking  Social History     Socioeconomic History     Marital status:      Spouse name: Not on file     Number of children: Not on file     Years of education: Not on file     Highest education level: Not on file   Occupational History     Not on file   Tobacco Use     Smoking status: Never Smoker     Smokeless tobacco: Never Used   Vaping Use     Vaping Use: Never used   Substance and Sexual Activity     Alcohol use: No     Alcohol/week: 0.0 standard drinks     Drug use: No     Sexual activity: Not Currently     Partners: Male   Other Topics Concern     Parent/sibling w/ CABG, MI or angioplasty before 65F 55M? Not Asked      Service Not Asked     Blood Transfusions Not Asked     Caffeine Concern No     Comment: a couple iced teas daily     Occupational Exposure Not Asked     Hobby Hazards Not Asked     Sleep Concern Not Asked     Stress Concern Not Asked     Weight Concern Not Asked     Special Diet No     Back Care Not Asked     Exercise No     Comment: none recently     Bike Helmet Not Asked     Seat Belt Not Asked     Self-Exams Not Asked   Social History Narrative     Not on file     Social Determinants of Health     Financial Resource Strain: Not on file   Food Insecurity: Not on file   Transportation Needs: Not on file   Physical Activity: Not on file   Stress: Not on file   Social Connections: Not on file   Intimate Partner Violence: Not on file   Housing Stability: Not on file        Fam Hx: reviewed  Family History   Problem Relation Age of Onset     Cancer Mother      Family History Negative Father          Screening: reviewed      All: reviewed    Meds: reviewed  Current Outpatient Medications   Medication Sig Dispense Refill     alendronate (FOSAMAX) 70 MG tablet Take 1 tablet (70 mg) by mouth every 7 days 13 tablet 4     amLODIPine (NORVASC) 5 MG tablet TAKE 1 TABLET BY MOUTH EVERY DAY 30 tablet 0     hydrochlorothiazide (HYDRODIURIL) 12.5 MG tablet  "Take 1 tablet (12.5 mg) by mouth daily 30 tablet 1     losartan-hydrochlorothiazide (HYZAAR) 50-12.5 MG tablet TAKE 1 TABLET BY MOUTH EVERY DAY 90 tablet 1     metoprolol succinate ER (TOPROL-XL) 50 MG 24 hr tablet Take 1 tablet (50 mg) by mouth 2 times daily 180 tablet 1     Multiple Vitamins-Minerals (WOMENS 50+ MULTI VITAMIN/MIN PO)        simvastatin (ZOCOR) 20 MG tablet TAKE 1 TABLET BY MOUTH EVERYDAY AT BEDTIME 30 tablet 0       OBJECTIVE:                                                    Physical Exam :  Blood pressure (!) 149/71, pulse 88, temperature 97.4  F (36.3  C), temperature source Tympanic, resp. rate 18, height 1.57 m (5' 1.8\"), weight 59.3 kg (130 lb 12.8 oz), SpO2 94 %, not currently breastfeeding.     NAD, appears comfortable  Skin clear, no rashes  Neck: supple, no JVD,  no thyroidmegaly  Lymph nodes non palpable in the cervical, supraclavicular axillaries,   Chest: clear to auscultation with good respiratory effort  Cardiac: S1S2, RRR, no mgr appreciated  Abdomen: soft, not tender, not distended, audible bowel sound, no hepatosplenomegaly, no palpable masses, no abdominal bruits  Extremities: no cyanosis, clubbing or edema.   Neuro: A, Ox3, no focal signs.  Breast exam in supine and erect position: they are symmetrical, no skin changes, no tenderness or nodes on palpation. Nipples are erect, no skin lesions, no discharge on pressure.    Pelvic exam: deferred, s/p menopause, no symptoms, no hx of abnormal pap         Leatha Hood MD  Internal Medicine         SUBJECTIVE:   Vicky Kang is a 87 year old female who presents for Preventive Visit.      Patient has been advised of split billing requirements and indicates understanding: Yes  Are you in the first 12 months of your Medicare coverage?  No    Healthy Habits:     In general, how would you rate your overall health?  Good    Frequency of exercise:  4-5 days/week    Duration of exercise:  30-45 minutes    Do you usually eat at least 4 " "servings of fruit and vegetables a day, include whole grains    & fiber and avoid regularly eating high fat or \"junk\" foods?  Yes    Taking medications regularly:  Yes    Medication side effects:  None    Ability to successfully perform activities of daily living:  No assistance needed    Home Safety:  No safety concerns identified    Hearing Impairment:  Difficulty following a conversation in a noisy restaurant or crowded room and difficulty understanding soft or whispered speech    In the past 6 months, have you been bothered by leaking of urine?  No    In general, how would you rate your overall mental or emotional health?  Excellent      PHQ-2 Total Score: 0    Additional concerns today:  Yes    Do you feel safe in your environment? Yes    Have you ever done Advance Care Planning? (For example, a Health Directive, POLST, or a discussion with a medical provider or your loved ones about your wishes): No, advance care planning information given to patient to review.  Patient declined advance care planning discussion at this time.       Fall risk  Fallen 2 or more times in the past year?: No  Any fall with injury in the past year?: No    Cognitive Screening   1) Repeat 3 items (Leader, Season, Table)    2) Clock draw: NORMAL  3) 3 item recall: Recalls 3 objects  Results: 3 items recalled: COGNITIVE IMPAIRMENT LESS LIKELY    Mini-CogTM Copyright S [employer]. Licensed by the author for use in [facility]; reprinted with permission (darya@.[facility]). All rights reserved.      Do you have sleep apnea, excessive snoring or daytime drowsiness?: daytime drowsiness    Reviewed and updated as needed this visit by clinical staff                    Reviewed and updated as needed this visit by Provider                   Social History     Tobacco Use     Smoking status: Never Smoker     Smokeless tobacco: Never Used   Substance Use Topics     Alcohol use: No     Alcohol/week: 0.0 standard drinks         Alcohol Use " 7/15/2021   Prescreen: >3 drinks/day or >7 drinks/week? Not Applicable   Prescreen: >3 drinks/day or >7 drinks/week? -           Hyperlipidemia Follow-Up      Are you regularly taking any medication or supplement to lower your cholesterol?   Yes- SIMVASTATIN    Are you having muscle aches or other side effects that you think could be caused by your cholesterol lowering medication?  No    Hypertension Follow-up      Do you check your blood pressure regularly outside of the clinic? No     Are you following a low salt diet? Yes    Are your blood pressures ever more than 140 on the top number (systolic) OR more   than 90 on the bottom number (diastolic), for example 140/90? No      Current providers sharing in care for this patient include:   Patient Care Team:  Leatha Conroy MD as PCP - General (Internal Medicine)  Leatha Conroy MD as Assigned PCP  Apolinar Porter MD as Assigned Musculoskeletal Provider    The following health maintenance items are reviewed in Epic and correct as of today:  Health Maintenance Due   Topic Date Due     ANNUAL REVIEW OF HM ORDERS  Never done     PHQ-2 (once per calendar year)  01/01/2022     FALL RISK ASSESSMENT  07/15/2022     MEDICARE ANNUAL WELLNESS VISIT  07/15/2022     Labs reviewed in EPIC        Pertinent mammograms are reviewed under the imaging tab.    Review of Systems   Constitutional: Negative for chills and fever.   HENT: Positive for congestion and hearing loss. Negative for ear pain and sore throat.    Eyes: Negative for pain and visual disturbance.   Respiratory: Negative for cough and shortness of breath.    Cardiovascular: Negative for chest pain, palpitations and peripheral edema.   Gastrointestinal: Negative for abdominal pain, constipation, diarrhea, heartburn, hematochezia and nausea.   Breasts:  Negative for tenderness, breast mass and discharge.   Genitourinary: Negative for dysuria, frequency, genital sores, hematuria, pelvic  "pain, urgency, vaginal bleeding and vaginal discharge.   Musculoskeletal: Positive for arthralgias. Negative for joint swelling and myalgias.   Skin: Negative for rash.   Neurological: Positive for dizziness. Negative for weakness, headaches and paresthesias.   Psychiatric/Behavioral: Negative for mood changes. The patient is not nervous/anxious.      Patient has been advised of split billing requirements and indicates understanding: Yes At the check in, at the      COUNSELING:  Reviewed preventive health counseling, as reflected in patient instructions       Regular exercise       Healthy diet/nutrition    Estimated body mass index is 17.95 kg/m  as calculated from the following:    Height as of 11/9/21: 1.57 m (5' 1.8\").    Weight as of 11/9/21: 44.2 kg (97 lb 8 oz).        She reports that she has never smoked. She has never used smokeless tobacco.      Appropriate preventive services were discussed with this patient, including applicable screening as appropriate for cardiovascular disease, diabetes, osteopenia/osteoporosis, and glaucoma.  As appropriate for age/gender, discussed screening for colorectal cancer, prostate cancer, breast cancer, and cervical cancer. Checklist reviewing preventive services available has been given to the patient.    Reviewed patients plan of care and provided an AVS. The Basic Care Plan (routine screening as documented in Health Maintenance) for Vicky meets the Care Plan requirement. This Care Plan has been established and reviewed with the Patient.    Counseling Resources:  ATP IV Guidelines  Pooled Cohorts Equation Calculator  Breast Cancer Risk Calculator  Breast Cancer: Medication to Reduce Risk  FRAX Risk Assessment  ICSI Preventive Guidelines  Dietary Guidelines for Americans, 2010  Soundtracker's MyPlate  ASA Prophylaxis  Lung CA Screening    Leatha Conroy MD  [facility]    Identified Health Risks:  "

## 2022-07-19 NOTE — PATIENT INSTRUCTIONS
Plan:  1.  Labs today - [address]   2. Stop simvastatin  3. Start Pravastatin 20 mg daily at bed time  4. Continue the other meds, same doses for now.  5. Gabapentin 100 mg daily ( at bed time) as needed for intense L thigh pain   6. The following vaccines are recommended for you. Please check with your insurance about coverage.  Some insurances cover better if you have these vaccines at the pharmacy:  -- Tetanus vaccine -- Feb 2023

## 2022-07-19 NOTE — LETTER
August 2, 2022      Vicky MARYAN Pearl  [address] [facility] [address]  [facility] [address]        Dear ,    We are writing to inform you of your test results.    Test results indicate you may require additional follow up, see comment below.    Resulted Orders   Vitamin D Deficiency   Result Value Ref Range    Vitamin D, Total (25-Hydroxy) 41 20 - 75 ug/L    Narrative    Season, race, dietary intake, and treatment affect the concentration of 25-hydroxy-Vitamin D. Values may decrease during winter months and increase during summer months. Values 20-29 ug/L may indicate Vitamin D insufficiency and values <20 ug/L may indicate Vitamin D deficiency.    Vitamin D determination is routinely performed by an immunoassay specific for 25 hydroxyvitamin D3.  If an individual is on vitamin D2(ergocalciferol) supplementation, please specify 25 OH vitamin D2 and D3 level determination by LCMSMS test VITD23.     Lipid panel reflex to direct LDL Fasting   Result Value Ref Range    Cholesterol 139 <200 mg/dL    Triglycerides 212 (H) <150 mg/dL    Direct Measure HDL 47 (L) >=50 mg/dL    LDL Cholesterol Calculated 50 <=100 mg/dL    Non HDL Cholesterol 92 <130 mg/dL    Patient Fasting > 8hrs? Yes     Narrative    Cholesterol  Desirable:  <200 mg/dL    Triglycerides  Normal:  Less than 150 mg/dL  Borderline High:  150-199 mg/dL  High:  200-499 mg/dL  Very High:  Greater than or equal to 500 mg/dL    Direct Measure HDL  Female:  Greater than or equal to 50 mg/dL   Male:  Greater than or equal to 40 mg/dL    LDL Cholesterol  Desirable:  <100mg/dL  Above Desirable:  100-129 mg/dL   Borderline High:  130-159 mg/dL   High:  160-189 mg/dL   Very High:  >= 190 mg/dL    Non HDL Cholesterol  Desirable:  130 mg/dL  Above Desirable:  130-159 mg/dL  Borderline High:  160-189 mg/dL  High:  190-219 mg/dL  Very High:  Greater than or equal to 220 mg/dL   Comprehensive metabolic panel   Result Value Ref Range    Sodium 141 133 - 144 mmol/L    Potassium 4.4  3.4 - 5.3 mmol/L    Chloride 105 94 - 109 mmol/L    Carbon Dioxide (CO2) 29 20 - 32 mmol/L    Anion Gap 7 3 - 14 mmol/L    Urea Nitrogen 19 7 - 30 mg/dL    Creatinine 0.58 0.52 - 1.04 mg/dL    Calcium 9.6 8.5 - 10.1 mg/dL    Glucose 119 (H) 70 - 99 mg/dL    Alkaline Phosphatase 59 40 - 150 U/L    AST 17 0 - 45 U/L    ALT 23 0 - 50 U/L    Protein Total 7.5 6.8 - 8.8 g/dL    Albumin 4.1 3.4 - 5.0 g/dL    Bilirubin Total 0.5 0.2 - 1.3 mg/dL    GFR Estimate 87 >60 mL/min/1.73m2      Comment:      Effective December 21, 2021 eGFRcr in adults is calculated using the 2021 CKD-EPI creatinine equation which includes age and gender (Ina et al., NE, DOI: 10.1056/TNDDmm8742242)   CBC with platelets   Result Value Ref Range    WBC Count 7.5 4.0 - 11.0 10e3/uL    RBC Count 4.64 3.80 - 5.20 10e6/uL    Hemoglobin 14.7 11.7 - 15.7 g/dL    Hematocrit 43.4 35.0 - 47.0 %    MCV 94 78 - 100 fL    MCH 31.7 26.5 - 33.0 pg    MCHC 33.9 31.5 - 36.5 g/dL    RDW 12.0 10.0 - 15.0 %    Platelet Count 310 150 - 450 10e3/uL   TSH with free T4 reflex   Result Value Ref Range    TSH 1.71 0.40 - 4.00 mU/L   Albumin Random Urine Quantitative with Creat Ratio   Result Value Ref Range    Creatinine Urine mg/dL 118 mg/dL    Albumin Urine mg/L 16 mg/L    Albumin Urine mg/g Cr 13.56 0.00 - 25.00 mg/g Cr       If you have any questions or concerns, please call the clinic at the number listed above.       Sincerely,      Leatha Conroy MD

## 2022-07-20 LAB
ALBUMIN SERPL-MCNC: 4.1 G/DL (ref 3.4–5)
ALP SERPL-CCNC: 59 U/L (ref 40–150)
ALT SERPL W P-5'-P-CCNC: 23 U/L (ref 0–50)
ANION GAP SERPL CALCULATED.3IONS-SCNC: 7 MMOL/L (ref 3–14)
AST SERPL W P-5'-P-CCNC: 17 U/L (ref 0–45)
BILIRUB SERPL-MCNC: 0.5 MG/DL (ref 0.2–1.3)
BUN SERPL-MCNC: 19 MG/DL (ref 7–30)
CALCIUM SERPL-MCNC: 9.6 MG/DL (ref 8.5–10.1)
CHLORIDE BLD-SCNC: 105 MMOL/L (ref 94–109)
CHOLEST SERPL-MCNC: 139 MG/DL
CO2 SERPL-SCNC: 29 MMOL/L (ref 20–32)
CREAT SERPL-MCNC: 0.58 MG/DL (ref 0.52–1.04)
CREAT UR-MCNC: 118 MG/DL
DEPRECATED CALCIDIOL+CALCIFEROL SERPL-MC: 41 UG/L (ref 20–75)
FASTING STATUS PATIENT QL REPORTED: YES
GFR SERPL CREATININE-BSD FRML MDRD: 87 ML/MIN/1.73M2
GLUCOSE BLD-MCNC: 119 MG/DL (ref 70–99)
HDLC SERPL-MCNC: 47 MG/DL
LDLC SERPL CALC-MCNC: 50 MG/DL
MICROALBUMIN UR-MCNC: 16 MG/L
MICROALBUMIN/CREAT UR: 13.56 MG/G CR (ref 0–25)
NONHDLC SERPL-MCNC: 92 MG/DL
POTASSIUM BLD-SCNC: 4.4 MMOL/L (ref 3.4–5.3)
PROT SERPL-MCNC: 7.5 G/DL (ref 6.8–8.8)
SODIUM SERPL-SCNC: 141 MMOL/L (ref 133–144)
TRIGL SERPL-MCNC: 212 MG/DL

## 2022-07-21 LAB — TSH SERPL DL<=0.005 MIU/L-ACNC: 1.71 MU/L (ref 0.4–4)

## 2022-08-09 ENCOUNTER — TRANSFERRED RECORDS (OUTPATIENT)
Dept: HEALTH INFORMATION MANAGEMENT | Facility: CLINIC | Age: 87
End: 2022-08-09

## 2022-08-15 ENCOUNTER — TRANSFERRED RECORDS (OUTPATIENT)
Dept: HEALTH INFORMATION MANAGEMENT | Facility: CLINIC | Age: 87
End: 2022-08-15

## 2022-08-19 ENCOUNTER — TELEPHONE (OUTPATIENT)
Dept: INTERNAL MEDICINE | Facility: CLINIC | Age: 87
End: 2022-08-19

## 2022-08-19 DIAGNOSIS — R30.0 DYSURIA: Primary | ICD-10-CM

## 2022-08-19 RX ORDER — NITROFURANTOIN 25; 75 MG/1; MG/1
100 CAPSULE ORAL 2 TIMES DAILY
Qty: 10 CAPSULE | Refills: 0 | Status: SHIPPED | OUTPATIENT
Start: 2022-08-19 | End: 2023-02-21

## 2022-08-19 NOTE — TELEPHONE ENCOUNTER
Patient is calling with UTI symptoms that started around 5am today. She is urgency and burning with urination. There are no appointments today and she does not have my chart to do an evisit.

## 2022-08-19 NOTE — TELEPHONE ENCOUNTER
Macrobid Rx done  If the symptoms are not better, she will need to be evaluated at the urgent care

## 2022-08-25 DIAGNOSIS — I10 BENIGN HYPERTENSION: ICD-10-CM

## 2022-08-27 RX ORDER — LOSARTAN POTASSIUM AND HYDROCHLOROTHIAZIDE 12.5; 5 MG/1; MG/1
TABLET ORAL
Qty: 90 TABLET | Refills: 1 | OUTPATIENT
Start: 2022-08-27

## 2022-09-02 DIAGNOSIS — I10 BENIGN HYPERTENSION: ICD-10-CM

## 2022-09-03 RX ORDER — METOPROLOL SUCCINATE 50 MG/1
TABLET, EXTENDED RELEASE ORAL
Qty: 180 TABLET | Refills: 1 | OUTPATIENT
Start: 2022-09-03

## 2023-01-21 NOTE — TELEPHONE ENCOUNTER
Per office visit 3/10/20:  (R00.2) Palpitations  Comment:  Ongoing  Plan: CARDIOLOGY EVAL ADULT REFERRAL, Echocardiogram Complete  Advised pt to avoid benadryl use  Follow up with cardiology if palpitations are not improving  Schedule echocardiogram    Per telephone encounter 3/18/20 patient was rescheduled for cardiology appointment to 4/23/20    Patient continues to have palpitations with no changed, please advise,    Thank you.     Pain management and breastfeeding

## 2023-02-21 ENCOUNTER — OFFICE VISIT (OUTPATIENT)
Dept: INTERNAL MEDICINE | Facility: CLINIC | Age: 88
End: 2023-02-21
Payer: COMMERCIAL

## 2023-02-21 VITALS
BODY MASS INDEX: 24.35 KG/M2 | TEMPERATURE: 97.9 F | OXYGEN SATURATION: 94 % | RESPIRATION RATE: 18 BRPM | HEIGHT: 62 IN | WEIGHT: 132.3 LBS | HEART RATE: 93 BPM | SYSTOLIC BLOOD PRESSURE: 136 MMHG | DIASTOLIC BLOOD PRESSURE: 64 MMHG

## 2023-02-21 DIAGNOSIS — I10 BENIGN HYPERTENSION: Primary | ICD-10-CM

## 2023-02-21 PROCEDURE — 99213 OFFICE O/P EST LOW 20 MIN: CPT | Performed by: INTERNAL MEDICINE

## 2023-02-21 ASSESSMENT — PAIN SCALES - GENERAL: PAINLEVEL: NO PAIN (0)

## 2023-02-21 NOTE — PATIENT INSTRUCTIONS
Plan:  1. Take the Metoprolol 50 mg twice a day   2. Continue the other meds, same doses for now.  3. Continue to Monitor the blood pressure at home   4. If the blood pressure goes below 120, go back to metoprolol 50 mg once a day and call the doctor   5. Schedule ANNUAL EXAM after July 29, 2023   Come fasting, drink water

## 2023-02-21 NOTE — PROGRESS NOTES
Dr Hood's note      Patient's instructions / PLAN:                                                        Plan:  1. Take the Metoprolol 50 mg twice a day   2. Continue the other meds, same doses for now.  3. Continue to Monitor the blood pressure at home   4. If the blood pressure goes below 120, go back to metoprolol 50 mg once a day and call the doctor   5. Schedule ANNUAL EXAM after July 29, 2023   Come fasting, drink water         ASSESSMENT & PLAN:                                                      (I10) Benign hypertension  (primary encounter diagnosis)  Comment: Not controlled   Plan: as above        Chief complaint:                                                      HTN    SUBJECTIVE:                                                    History of present illness:    HTN  -- home BP: 157/71, 150/65, 160/75 P 70-72  --  in UC when she had UTI  -- I checked the BP today. I think her machine is accurate        Subjective   Vicky is a 88 year old, presenting for the following health issues:  Patient is being seen for an hypertension.    HPI     Hyperlipidemia Follow-Up      Are you regularly taking any medication or supplement to lower your cholesterol?   Yes- pravastatin    Are you having muscle aches or other side effects that you think could be caused by your cholesterol lowering medication?      Hypertension Follow-up      Do you check your blood pressure regularly outside of the clinic? Yes     Are you following a low salt diet? Yes    Are your blood pressures ever more than 140 on the top number (systolic) OR more   than 90 on the bottom number (diastolic), for example 140/90? Yes systolic      How many servings of fruits and vegetables do you eat daily?  2-3    On average, how many sweetened beverages do you drink each day (Examples: soda, juice, sweet tea, etc.  Do NOT count diet or artificially sweetened beverages)?   0    How many days per week do you exercise enough to make your heart  "beat faster? 3 or less    How many minutes a day do you exercise enough to make your heart beat faster? 9 or less    How many days per week do you miss taking your medication? 0    Review of Systems:                                                      ROS: negative for fever, chills, cough, wheezes, chest pain, shortness of breath, vomiting, abdominal pain, leg swelling       OBJECTIVE:             Physical exam:  Blood pressure 136/64, pulse 93, temperature 97.9  F (36.6  C), temperature source Tympanic, resp. rate 18, height 1.57 m (5' 1.8\"), weight 60 kg (132 lb 4.8 oz), SpO2 94 %, not currently breastfeeding.     NAD, appears comfortable  Skin: no rashes   Neck: supple, no JVD,  No thyroidmegaly. Lymph nodes nonpalpable cervical and supraclavicular.  Chest: clear to auscultation bilaterally, good respiratory effort  Heart: S1 S2, RRR, no mgr appreciated  Abdomen: soft, not tender,   Extremities: no edema,  Neurologic: A, Ox3, no focal signs appreciated    PMHx: reviewed  Past Medical History:   Diagnosis Date     Abnormal finding on thyroid function test 11/10/2015     Hyperlipidemia      Hypertension      Neuropathy      Subclinical hypothyroidism 11/10/2015     Thyroid nodule 11/10/2015      PSHx: reviewed  Past Surgical History:   Procedure Laterality Date     CATARACT IOL, RT/LT       ENT SURGERY   Spring 2017    throat cyst removal     PHACOEMULSIFICATION CLEAR CORNEA WITH STANDARD INTRAOCULAR LENS IMPLANT  1/5/2012    Procedure:PHACOEMULSIFICATION CLEAR CORNEA WITH STANDARD INTRAOCULAR LENS IMPLANT; LEFT PHACOEMULSIFICATION CLEAR CORNEA WITH STANDARD LENS IMPLANT ; Surgeon:TUNDE GARDNER; Location: [facility]     VITRECTOMY ANTERIOR          Meds: reviewed  Current Outpatient Medications   Medication Sig Dispense Refill     amLODIPine (NORVASC) 5 MG tablet TAKE 1 TABLET BY MOUTH EVERY DAY 90 tablet 4     losartan-hydrochlorothiazide (HYZAAR) 50-12.5 MG tablet Take 1 tablet by mouth daily 90 tablet 4     " metoprolol succinate ER (TOPROL XL) 50 MG 24 hr tablet Take 1 tablet (50 mg) by mouth 2 times daily 180 tablet 4     Multiple Vitamins-Minerals (WOMENS 50+ MULTI VITAMIN/MIN PO)        pravastatin (PRAVACHOL) 20 MG tablet Take 1 tablet (20 mg) by mouth daily 90 tablet 4       Soc Hx: reviewed  Fam Hx: reviewed      Chart documentation was completed, in part, with [employer] voice-recognition software. Even though reviewed, some grammatical, spelling, and word errors may remain.      Leatha Hood MD  Internal Medicine

## 2023-07-21 ENCOUNTER — OFFICE VISIT (OUTPATIENT)
Dept: INTERNAL MEDICINE | Facility: CLINIC | Age: 88
End: 2023-07-21
Payer: COMMERCIAL

## 2023-07-21 VITALS
WEIGHT: 133.9 LBS | BODY MASS INDEX: 24.64 KG/M2 | OXYGEN SATURATION: 96 % | HEART RATE: 96 BPM | TEMPERATURE: 97.3 F | RESPIRATION RATE: 18 BRPM | HEIGHT: 62 IN | SYSTOLIC BLOOD PRESSURE: 138 MMHG | DIASTOLIC BLOOD PRESSURE: 64 MMHG

## 2023-07-21 DIAGNOSIS — M85.89 OSTEOPENIA OF MULTIPLE SITES: ICD-10-CM

## 2023-07-21 DIAGNOSIS — E04.1 THYROID NODULE: ICD-10-CM

## 2023-07-21 DIAGNOSIS — Z00.00 ROUTINE GENERAL MEDICAL EXAMINATION AT A HEALTH CARE FACILITY: Primary | ICD-10-CM

## 2023-07-21 DIAGNOSIS — E55.9 VITAMIN D DEFICIENCY: ICD-10-CM

## 2023-07-21 DIAGNOSIS — R42 VERTIGO: ICD-10-CM

## 2023-07-21 DIAGNOSIS — E78.5 HYPERLIPIDEMIA LDL GOAL <130: ICD-10-CM

## 2023-07-21 DIAGNOSIS — R73.9 BLOOD SUGAR INCREASED: ICD-10-CM

## 2023-07-21 DIAGNOSIS — I10 BENIGN HYPERTENSION: ICD-10-CM

## 2023-07-21 LAB
ALBUMIN SERPL BCG-MCNC: 4.2 G/DL (ref 3.5–5.2)
ALP SERPL-CCNC: 72 U/L (ref 35–104)
ALT SERPL W P-5'-P-CCNC: 19 U/L (ref 0–50)
ANION GAP SERPL CALCULATED.3IONS-SCNC: 16 MMOL/L (ref 7–15)
AST SERPL W P-5'-P-CCNC: 24 U/L (ref 0–45)
BILIRUB SERPL-MCNC: 0.5 MG/DL
BUN SERPL-MCNC: 12.2 MG/DL (ref 8–23)
CALCIUM SERPL-MCNC: 9.5 MG/DL (ref 8.8–10.2)
CHLORIDE SERPL-SCNC: 96 MMOL/L (ref 98–107)
CHOLEST SERPL-MCNC: 131 MG/DL
CK SERPL-CCNC: 78 U/L (ref 26–192)
CREAT SERPL-MCNC: 0.63 MG/DL (ref 0.51–0.95)
CREAT UR-MCNC: 36.5 MG/DL
DEPRECATED HCO3 PLAS-SCNC: 25 MMOL/L (ref 22–29)
ERYTHROCYTE [DISTWIDTH] IN BLOOD BY AUTOMATED COUNT: 11.7 % (ref 10–15)
GFR SERPL CREATININE-BSD FRML MDRD: 85 ML/MIN/1.73M2
GLUCOSE SERPL-MCNC: 132 MG/DL (ref 70–99)
HBA1C MFR BLD: 6.2 % (ref 0–5.6)
HCT VFR BLD AUTO: 41.7 % (ref 35–47)
HDLC SERPL-MCNC: 37 MG/DL
HGB BLD-MCNC: 14.2 G/DL (ref 11.7–15.7)
LDLC SERPL CALC-MCNC: 53 MG/DL
MCH RBC QN AUTO: 31.5 PG (ref 26.5–33)
MCHC RBC AUTO-ENTMCNC: 34.1 G/DL (ref 31.5–36.5)
MCV RBC AUTO: 93 FL (ref 78–100)
MICROALBUMIN UR-MCNC: <12 MG/L
MICROALBUMIN/CREAT UR: NORMAL MG/G{CREAT}
NONHDLC SERPL-MCNC: 94 MG/DL
PLATELET # BLD AUTO: 335 10E3/UL (ref 150–450)
POTASSIUM SERPL-SCNC: 3.1 MMOL/L (ref 3.4–5.3)
PROT SERPL-MCNC: 7.2 G/DL (ref 6.4–8.3)
RBC # BLD AUTO: 4.51 10E6/UL (ref 3.8–5.2)
SODIUM SERPL-SCNC: 137 MMOL/L (ref 136–145)
TRIGL SERPL-MCNC: 204 MG/DL
TSH SERPL DL<=0.005 MIU/L-ACNC: 2.26 UIU/ML (ref 0.3–4.2)
WBC # BLD AUTO: 8.7 10E3/UL (ref 4–11)

## 2023-07-21 PROCEDURE — G0439 PPPS, SUBSEQ VISIT: HCPCS | Performed by: INTERNAL MEDICINE

## 2023-07-21 PROCEDURE — 83036 HEMOGLOBIN GLYCOSYLATED A1C: CPT | Performed by: INTERNAL MEDICINE

## 2023-07-21 PROCEDURE — 82570 ASSAY OF URINE CREATININE: CPT | Performed by: INTERNAL MEDICINE

## 2023-07-21 PROCEDURE — 82550 ASSAY OF CK (CPK): CPT | Performed by: INTERNAL MEDICINE

## 2023-07-21 PROCEDURE — 80053 COMPREHEN METABOLIC PANEL: CPT | Performed by: INTERNAL MEDICINE

## 2023-07-21 PROCEDURE — 80061 LIPID PANEL: CPT | Performed by: INTERNAL MEDICINE

## 2023-07-21 PROCEDURE — 85027 COMPLETE CBC AUTOMATED: CPT | Performed by: INTERNAL MEDICINE

## 2023-07-21 PROCEDURE — 36415 COLL VENOUS BLD VENIPUNCTURE: CPT | Performed by: INTERNAL MEDICINE

## 2023-07-21 PROCEDURE — 99214 OFFICE O/P EST MOD 30 MIN: CPT | Mod: 25 | Performed by: INTERNAL MEDICINE

## 2023-07-21 PROCEDURE — 84443 ASSAY THYROID STIM HORMONE: CPT | Performed by: INTERNAL MEDICINE

## 2023-07-21 PROCEDURE — 82043 UR ALBUMIN QUANTITATIVE: CPT | Performed by: INTERNAL MEDICINE

## 2023-07-21 PROCEDURE — 82306 VITAMIN D 25 HYDROXY: CPT | Performed by: INTERNAL MEDICINE

## 2023-07-21 RX ORDER — AMLODIPINE BESYLATE 5 MG/1
TABLET ORAL
Qty: 90 TABLET | Refills: 4 | Status: SHIPPED | OUTPATIENT
Start: 2023-07-21 | End: 2024-05-06

## 2023-07-21 RX ORDER — METOPROLOL SUCCINATE 50 MG/1
50 TABLET, EXTENDED RELEASE ORAL 2 TIMES DAILY
Qty: 180 TABLET | Refills: 4 | Status: SHIPPED | OUTPATIENT
Start: 2023-07-21 | End: 2024-05-06

## 2023-07-21 RX ORDER — PRAVASTATIN SODIUM 20 MG
20 TABLET ORAL DAILY
Qty: 90 TABLET | Refills: 4 | Status: SHIPPED | OUTPATIENT
Start: 2023-07-21 | End: 2024-05-06

## 2023-07-21 RX ORDER — LOSARTAN POTASSIUM AND HYDROCHLOROTHIAZIDE 12.5; 5 MG/1; MG/1
1 TABLET ORAL DAILY
Qty: 90 TABLET | Refills: 4 | Status: SHIPPED | OUTPATIENT
Start: 2023-07-21 | End: 2023-08-14

## 2023-07-21 ASSESSMENT — ENCOUNTER SYMPTOMS
HEADACHES: 0
DYSURIA: 0
FEVER: 0
BREAST MASS: 0
DIZZINESS: 1
FREQUENCY: 0
COUGH: 0
NERVOUS/ANXIOUS: 0
SORE THROAT: 0
HEARTBURN: 0
HEMATOCHEZIA: 0
PARESTHESIAS: 0
ABDOMINAL PAIN: 0
DIARRHEA: 1
HEMATURIA: 0
CHILLS: 0
PALPITATIONS: 0
MYALGIAS: 0
ARTHRALGIAS: 0
EYE PAIN: 0
JOINT SWELLING: 0
NAUSEA: 0
WEAKNESS: 0
CONSTIPATION: 0
SHORTNESS OF BREATH: 0

## 2023-07-21 ASSESSMENT — PAIN SCALES - GENERAL: PAINLEVEL: NO PAIN (0)

## 2023-07-21 ASSESSMENT — ACTIVITIES OF DAILY LIVING (ADL): CURRENT_FUNCTION: NO ASSISTANCE NEEDED

## 2023-07-21 NOTE — PATIENT INSTRUCTIONS
Plan:  1.  Labs today - [address]   2. Continue same meds, same doses for now   3. The following vaccines are recommended for you. Please check with your insurance about coverage.  Some insurances cover better if you have these vaccines at the pharmacy:  -- Pneumonia 20 - optional   -- Tetanus vaccine - Td  4. Thyroid nodules --  To schedule this test you may call Scheduling center at [phone]   5. Next appointment ANNUAL WELLNESS after July 22, 2024

## 2023-07-21 NOTE — PROGRESS NOTES
Dr Hood's note    Patient's instructions / PLAN:                                                        Plan:  1.  Labs today - [address]   2. Continue same meds, same doses for now   3. The following vaccines are recommended for you. Please check with your insurance about coverage.  Some insurances cover better if you have these vaccines at the pharmacy:  -- Pneumonia 20 - optional   -- Tetanus vaccine - Td  4. Thyroid nodules --  To schedule this test you may call Scheduling center at [phone]   5. Next appointment ANNUAL WELLNESS after July 22, 2024          ASSESSMENT & PLAN:                                                      (Z00.00) Routine general medical examination at a health care facility  (primary encounter diagnosis)  Comment:   Plan: REVIEW OF HEALTH MAINTENANCE PROTOCOL ORDERS,         CBC with platelets, Lipid panel reflex to         direct LDL Fasting, Comprehensive metabolic         panel, TSH with free T4 reflex, Albumin Random         Urine Quantitative with Creat Ratio, Vitamin D         Deficiency, CK total, Hemoglobin A1c            (I10) Benign hypertension  Comment: Controlled    Plan: REVIEW OF HEALTH MAINTENANCE PROTOCOL ORDERS,         amLODIPine (NORVASC) 5 MG tablet,         losartan-hydrochlorothiazide (HYZAAR) 50-12.5         MG tablet, metoprolol succinate ER (TOPROL XL)         50 MG 24 hr tablet, CBC with platelets, Lipid         panel reflex to direct LDL Fasting,         Comprehensive metabolic panel, TSH with free T4        reflex, Albumin Random Urine Quantitative with         Creat Ratio, Vitamin D Deficiency, CK total,         Hemoglobin A1c            (E78.5) Hyperlipidemia LDL goal <130  Comment: Controlled  Based on prior numbers   Plan: REVIEW OF HEALTH MAINTENANCE PROTOCOL ORDERS,         pravastatin (PRAVACHOL) 20 MG tablet, CBC with         platelets, Lipid panel reflex to direct LDL         Fasting, Comprehensive metabolic panel, TSH         with free T4  reflex, Albumin Random Urine         Quantitative with Creat Ratio, Vitamin D         Deficiency, CK total, Hemoglobin A1c            (R73.09) Blood sugar increased  Comment:   Plan: REVIEW OF HEALTH MAINTENANCE PROTOCOL ORDERS,         CBC with platelets, Lipid panel reflex to         direct LDL Fasting, Comprehensive metabolic         panel, TSH with free T4 reflex, Albumin Random         Urine Quantitative with Creat Ratio, Vitamin D         Deficiency, CK total, Hemoglobin A1c            (E55.9) Vitamin D deficiency  Comment:   Plan: REVIEW OF HEALTH MAINTENANCE PROTOCOL ORDERS,         CBC with platelets, Lipid panel reflex to         direct LDL Fasting, Comprehensive metabolic         panel, TSH with free T4 reflex, Albumin Random         Urine Quantitative with Creat Ratio, Vitamin D         Deficiency, CK total, Hemoglobin A1c            (E04.1) Thyroid nodules - 2015  Comment:   Plan: US Thyroid            (R42) Vertigo  Comment:   Plan: already ref to dizziness ctr from ent     (M85.89) Osteopenia -- patient decided for no treatment   Comment:   Plan:        Chief Complaint:                                                        Annual exam  Follow up chronic medical problems      SUBJECTIVE:                                                    History of present illness     We reviewed the chronic medical problems as above.   I reviewed the recent tests results in Epic     HTN  -- compliant with the meds  -- home BP in 130s     Balance issues  -- no falls.  -- she feels little off  -- eval by ENT and recommended [facility]    Osteopenia  -- Fosamax prescribed 2021 but she decided to stop it    ROS:     See below        PMHx: - reviewed  Past Medical History:   Diagnosis Date     Abnormal finding on thyroid function test 11/10/2015     Hyperlipidemia      Hypertension      Neuropathy      Subclinical hypothyroidism 11/10/2015     Thyroid nodule 11/10/2015       PSHx: reviewed  Past Surgical  History:   Procedure Laterality Date     CATARACT IOL, RT/LT       ENT SURGERY   Spring 2017    throat cyst removal     PHACOEMULSIFICATION CLEAR CORNEA WITH STANDARD INTRAOCULAR LENS IMPLANT  1/5/2012    Procedure:PHACOEMULSIFICATION CLEAR CORNEA WITH STANDARD INTRAOCULAR LENS IMPLANT; LEFT PHACOEMULSIFICATION CLEAR CORNEA WITH STANDARD LENS IMPLANT ; Surgeon:TUNDE GARDNER; Location: [facility]     VITRECTOMY ANTERIOR          Soc Hx: No daily alcohol, no smoking  Social History     Socioeconomic History     Marital status:      Spouse name: Not on file     Number of children: Not on file     Years of education: Not on file     Highest education level: Not on file   Occupational History     Not on file   Tobacco Use     Smoking status: Never     Passive exposure: Never     Smokeless tobacco: Never   Vaping Use     Vaping Use: Never used   Substance and Sexual Activity     Alcohol use: No     Alcohol/week: 0.0 standard drinks of alcohol     Drug use: No     Sexual activity: Not Currently     Partners: Male   Other Topics Concern     Parent/sibling w/ CABG, MI or angioplasty before 65F 55M? Not Asked      Service Not Asked     Blood Transfusions Not Asked     Caffeine Concern No     Comment: a couple iced teas daily     Occupational Exposure Not Asked     Hobby Hazards Not Asked     Sleep Concern Not Asked     Stress Concern Not Asked     Weight Concern Not Asked     Special Diet No     Back Care Not Asked     Exercise No     Comment: none recently     Bike Helmet Not Asked     Seat Belt Not Asked     Self-Exams Not Asked   Social History Narrative     Not on file     Social Determinants of Health     Financial Resource Strain: Not on file   Food Insecurity: Not on file   Transportation Needs: Not on file   Physical Activity: Not on file   Stress: Not on file   Social Connections: Not on file   Intimate Partner Violence: Not on file   Housing Stability: Not on file        Fam Hx: reviewed  Family  "History   Problem Relation Age of Onset     Cancer Mother      Family History Negative Father          Screening: reviewed      All: reviewed    Meds: reviewed  Current Outpatient Medications   Medication Sig Dispense Refill     amLODIPine (NORVASC) 5 MG tablet TAKE 1 TABLET BY MOUTH EVERY DAY 90 tablet 4     losartan-hydrochlorothiazide (HYZAAR) 50-12.5 MG tablet Take 1 tablet by mouth daily 90 tablet 4     metoprolol succinate ER (TOPROL XL) 50 MG 24 hr tablet Take 1 tablet (50 mg) by mouth 2 times daily 180 tablet 4     Multiple Vitamins-Minerals (WOMENS 50+ MULTI VITAMIN/MIN PO)        pravastatin (PRAVACHOL) 20 MG tablet Take 1 tablet (20 mg) by mouth daily 90 tablet 4       OBJECTIVE:                                                    Physical Exam :   Blood pressure 138/64, pulse 96, temperature 97.3  F (36.3  C), temperature source Tympanic, resp. rate 18, height 1.57 m (5' 1.8\"), weight 60.7 kg (133 lb 14.4 oz), SpO2 96 %, not currently breastfeeding.     NAD, appears comfortable  Skin clear, no rashes  Neck: supple, no JVD,  no thyroidmegaly  Lymph nodes non palpable in the cervical, supraclavicular axillaries,   Chest: clear to auscultation with good respiratory effort  Cardiac: S1S2, RRR, no mgr appreciated  Abdomen: soft, not tender, not distended, audible bowel sound, no hepatosplenomegaly, no palpable masses, no abdominal bruits  Extremities: no cyanosis, clubbing or edema.   Neuro: A, Ox3, no focal signs.  Breast exam in supine and erect position: they are symmetrical, no skin changes, no tenderness or nodes on palpation. Nipples are erect, no skin lesions, no discharge on pressure.    Pelvic exam: deferred, s/p menopause, no symptoms, no hx of abnormal pap         Leatha Hood MD  Internal Medicine         SUBJECTIVE:   Vicky is a 88 year old who presents for Preventive Visit.      7/21/2023     8:07 AM   Additional Questions   Roomed by Anisa Leblanc     Are you in the first 12 months of your " "Medicare coverage?  No    Healthy Habits:     In general, how would you rate your overall health?  Excellent    Frequency of exercise:  4-5 days/week    Duration of exercise:  45-60 minutes    Do you usually eat at least 4 servings of fruit and vegetables a day, include whole grains    & fiber and avoid regularly eating high fat or \"junk\" foods?  Yes    Taking medications regularly:  Yes    Medication side effects:  Not applicable    Ability to successfully perform activities of daily living:  No assistance needed    Home Safety:  No safety concerns identified    Hearing Impairment:  Difficulty following a conversation in a noisy restaurant or crowded room    In the past 6 months, have you been bothered by leaking of urine?  No    In general, how would you rate your overall mental or emotional health?  Excellent    Additional concerns today:  No        Have you ever done Advance Care Planning? (For example, a Health Directive, POLST, or a discussion with a medical provider or your loved ones about your wishes): No, advance care planning information given to patient to review.  Patient declined advance care planning discussion at this time.       Fall risk  Fallen 2 or more times in the past year?: No  Any fall with injury in the past year?: No    Cognitive Screening   1) Repeat 3 items (Leader, Season, Table)    2) Clock draw: NORMAL  3) 3 item recall: Recalls 3 objects  Results: 3 items recalled: COGNITIVE IMPAIRMENT LESS LIKELY    Mini-CogTM Copyright [employer]. Licensed by the author for use in [facility]; reprinted with permission (darya@.[facility]). All rights reserved.      Do you have sleep apnea, excessive snoring or daytime drowsiness?: no    Reviewed and updated as needed this visit by clinical staff   Tobacco  Allergies  Meds              Reviewed and updated as needed this visit by Provider                 Social History     Tobacco Use     Smoking status: Never     Passive exposure: Never     " Smokeless tobacco: Never   Substance Use Topics     Alcohol use: No     Alcohol/week: 0.0 standard drinks of alcohol             7/21/2023     8:07 AM   Alcohol Use   Prescreen: >3 drinks/day or >7 drinks/week? Not Applicable     Do you have a current opioid prescription? No  Do you use any other controlled substances or medications that are not prescribed by a provider? None          Hyperlipidemia Follow-Up      Are you regularly taking any medication or supplement to lower your cholesterol?   Yes- pravastatin    Are you having muscle aches or other side effects that you think could be caused by your cholesterol lowering medication?  No    Hypertension Follow-up      Do you check your blood pressure regularly outside of the clinic? Yes     Are you following a low salt diet? Yes    Are your blood pressures ever more than 140 on the top number (systolic) OR more   than 90 on the bottom number (diastolic), for example 140/90? Yes      Current providers sharing in care for this patient include:   Patient Care Team:  Leatha Conroy MD as PCP - General (Internal Medicine)  Leatha Conroy MD as Assigned PCP  Apolinar Porter MD as Assigned Musculoskeletal Provider    The following health maintenance items are reviewed in Epic and correct as of today:  Health Maintenance   Topic Date Due     DTAP/TDAP/TD IMMUNIZATION (2 - Td or Tdap) 02/24/2023     COVID-19 Vaccine (6 - Pfizer series) 02/25/2023     ANNUAL REVIEW OF HM ORDERS  07/19/2023     MEDICARE ANNUAL WELLNESS VISIT  07/19/2023     INFLUENZA VACCINE (1) 09/01/2023     FALL RISK ASSESSMENT  07/21/2024     ADVANCE CARE PLANNING  07/19/2027     PHQ-2 (once per calendar year)  Completed     Pneumococcal Vaccine: 65+ Years  Completed     ZOSTER IMMUNIZATION  Completed     IPV IMMUNIZATION  Aged Out     MENINGITIS IMMUNIZATION  Aged Out     Labs reviewed in EPIC        Pertinent mammograms are reviewed under the imaging tab.    Review of  Systems   Constitutional: Negative for chills and fever.   HENT: Positive for congestion and hearing loss. Negative for ear pain and sore throat.    Eyes: Negative for pain and visual disturbance.   Respiratory: Negative for cough and shortness of breath.    Cardiovascular: Negative for chest pain, palpitations and peripheral edema.   Gastrointestinal: Positive for diarrhea. Negative for abdominal pain, constipation, heartburn, hematochezia and nausea.   Breasts:  Negative for tenderness, breast mass and discharge.   Genitourinary: Negative for dysuria, frequency, genital sores, hematuria, pelvic pain, urgency, vaginal bleeding and vaginal discharge.   Musculoskeletal: Negative for arthralgias, joint swelling and myalgias.   Skin: Negative for rash.   Neurological: Positive for dizziness. Negative for weakness, headaches and paresthesias.   Psychiatric/Behavioral: Negative for mood changes. The patient is not nervous/anxious.          Patient has been advised of split billing requirements and indicates understanding: Yes At the check in, at the        COUNSELING:  Reviewed preventive health counseling, as reflected in patient instructions       Regular exercise       Healthy diet/nutrition        She reports that she has never smoked. She has never been exposed to tobacco smoke. She has never used smokeless tobacco.      Appropriate preventive services were discussed with this patient, including applicable screening as appropriate for cardiovascular disease, diabetes, osteopenia/osteoporosis, and glaucoma.  As appropriate for age/gender, discussed screening for colorectal cancer, prostate cancer, breast cancer, and cervical cancer. Checklist reviewing preventive services available has been given to the patient.    Reviewed patients plan of care and provided an AVS. The Basic Care Plan (routine screening as documented in Health Maintenance) for Vicky meets the Care Plan requirement. This Care Plan has  been established and reviewed with the Patient.          Leatha Conroy MD  [facility]    Identified Health Risks:    I have reviewed Opioid Use Disorder and Substance Use Disorder risk factors and made any needed referrals.

## 2023-07-24 LAB — DEPRECATED CALCIDIOL+CALCIFEROL SERPL-MC: 44 UG/L (ref 20–75)

## 2023-08-01 ENCOUNTER — TELEPHONE (OUTPATIENT)
Dept: INTERNAL MEDICINE | Facility: CLINIC | Age: 88
End: 2023-08-01
Payer: COMMERCIAL

## 2023-08-01 DIAGNOSIS — E87.6 HYPOKALEMIA: Primary | ICD-10-CM

## 2023-08-01 RX ORDER — POTASSIUM CHLORIDE 750 MG/1
10 TABLET, EXTENDED RELEASE ORAL DAILY
Qty: 90 TABLET | Refills: 3 | Status: SHIPPED | OUTPATIENT
Start: 2023-08-01 | End: 2023-08-14

## 2023-08-01 NOTE — TELEPHONE ENCOUNTER
"    Please send letter + results     \" These are the recent blood test results.  The blood sugar is on the high side.  Low carbohydrate diet will help with the numbers.  The potassium is on the low side.  I sent the prescription for potassium 1 tablet daily.  The rest of the results are in acceptable range\"    "

## 2023-08-01 NOTE — LETTER
August 2, 2023      Vicky Kang  [id number]   [facility] [address]        Dear ,    We are writing to inform you of your test results.    These are the recent blood test results.  The blood sugar is on the high side.  Low carbohydrate diet will help with the numbers.  The potassium is on the low side.  I sent the prescription for potassium 1 tablet daily.  The rest of the results are in acceptable range    If you have any questions or concerns, please call the clinic at the number listed above.       Sincerely,        Leatha Hood MD

## 2023-08-07 ENCOUNTER — APPOINTMENT (OUTPATIENT)
Dept: MRI IMAGING | Facility: CLINIC | Age: 88
End: 2023-08-07
Attending: EMERGENCY MEDICINE
Payer: COMMERCIAL

## 2023-08-07 ENCOUNTER — HOSPITAL ENCOUNTER (OUTPATIENT)
Facility: CLINIC | Age: 88
Setting detail: OBSERVATION
Discharge: HOME | End: 2023-08-08
Attending: EMERGENCY MEDICINE | Admitting: EMERGENCY MEDICINE
Payer: COMMERCIAL

## 2023-08-07 ENCOUNTER — NURSE TRIAGE (OUTPATIENT)
Dept: INTERNAL MEDICINE | Facility: CLINIC | Age: 88
End: 2023-08-07
Payer: COMMERCIAL

## 2023-08-07 DIAGNOSIS — G45.9 TIA (TRANSIENT ISCHEMIC ATTACK): ICD-10-CM

## 2023-08-07 LAB
ANION GAP SERPL CALCULATED.3IONS-SCNC: 11 MMOL/L (ref 7–15)
BASOPHILS # BLD AUTO: 0.1 10E3/UL (ref 0–0.2)
BASOPHILS NFR BLD AUTO: 1 %
BUN SERPL-MCNC: 11.6 MG/DL (ref 8–23)
CALCIUM SERPL-MCNC: 9.7 MG/DL (ref 8.8–10.2)
CHLORIDE SERPL-SCNC: 101 MMOL/L (ref 98–107)
CREAT SERPL-MCNC: 0.65 MG/DL (ref 0.51–0.95)
DEPRECATED HCO3 PLAS-SCNC: 30 MMOL/L (ref 22–29)
EOSINOPHIL # BLD AUTO: 0.1 10E3/UL (ref 0–0.7)
EOSINOPHIL NFR BLD AUTO: 1 %
ERYTHROCYTE [DISTWIDTH] IN BLOOD BY AUTOMATED COUNT: 12 % (ref 10–15)
GFR SERPL CREATININE-BSD FRML MDRD: 84 ML/MIN/1.73M2
GLUCOSE BLDC GLUCOMTR-MCNC: 126 MG/DL (ref 70–99)
GLUCOSE SERPL-MCNC: 153 MG/DL (ref 70–99)
HCT VFR BLD AUTO: 42.2 % (ref 35–47)
HGB BLD-MCNC: 14.6 G/DL (ref 11.7–15.7)
HOLD SPECIMEN: NORMAL
HOLD SPECIMEN: NORMAL
IMM GRANULOCYTES # BLD: 0 10E3/UL
IMM GRANULOCYTES NFR BLD: 0 %
LYMPHOCYTES # BLD AUTO: 2 10E3/UL (ref 0.8–5.3)
LYMPHOCYTES NFR BLD AUTO: 21 %
MCH RBC QN AUTO: 32.3 PG (ref 26.5–33)
MCHC RBC AUTO-ENTMCNC: 34.6 G/DL (ref 31.5–36.5)
MCV RBC AUTO: 93 FL (ref 78–100)
MONOCYTES # BLD AUTO: 0.5 10E3/UL (ref 0–1.3)
MONOCYTES NFR BLD AUTO: 6 %
NEUTROPHILS # BLD AUTO: 6.9 10E3/UL (ref 1.6–8.3)
NEUTROPHILS NFR BLD AUTO: 71 %
NRBC # BLD AUTO: 0 10E3/UL
NRBC BLD AUTO-RTO: 0 /100
PLATELET # BLD AUTO: 340 10E3/UL (ref 150–450)
POTASSIUM SERPL-SCNC: 4.2 MMOL/L (ref 3.4–5.3)
RBC # BLD AUTO: 4.52 10E6/UL (ref 3.8–5.2)
SODIUM SERPL-SCNC: 142 MMOL/L (ref 136–145)
WBC # BLD AUTO: 9.6 10E3/UL (ref 4–11)

## 2023-08-07 PROCEDURE — 250N000013 HC RX MED GY IP 250 OP 250 PS 637: Performed by: EMERGENCY MEDICINE

## 2023-08-07 PROCEDURE — G0378 HOSPITAL OBSERVATION PER HR: HCPCS

## 2023-08-07 PROCEDURE — 255N000002 HC RX 255 OP 636: Mod: JZ | Performed by: EMERGENCY MEDICINE

## 2023-08-07 PROCEDURE — 70549 MR ANGIOGRAPH NECK W/O&W/DYE: CPT

## 2023-08-07 PROCEDURE — 80048 BASIC METABOLIC PNL TOTAL CA: CPT | Performed by: EMERGENCY MEDICINE

## 2023-08-07 PROCEDURE — 70544 MR ANGIOGRAPHY HEAD W/O DYE: CPT

## 2023-08-07 PROCEDURE — 85025 COMPLETE CBC W/AUTO DIFF WBC: CPT | Performed by: EMERGENCY MEDICINE

## 2023-08-07 PROCEDURE — 93005 ELECTROCARDIOGRAM TRACING: CPT

## 2023-08-07 PROCEDURE — A9585 GADOBUTROL INJECTION: HCPCS | Mod: JZ | Performed by: EMERGENCY MEDICINE

## 2023-08-07 PROCEDURE — 36415 COLL VENOUS BLD VENIPUNCTURE: CPT | Performed by: EMERGENCY MEDICINE

## 2023-08-07 PROCEDURE — 250N000013 HC RX MED GY IP 250 OP 250 PS 637: Performed by: STUDENT IN AN ORGANIZED HEALTH CARE EDUCATION/TRAINING PROGRAM

## 2023-08-07 PROCEDURE — 70553 MRI BRAIN STEM W/O & W/DYE: CPT

## 2023-08-07 PROCEDURE — 99285 EMERGENCY DEPT VISIT HI MDM: CPT | Mod: 25

## 2023-08-07 PROCEDURE — 82962 GLUCOSE BLOOD TEST: CPT

## 2023-08-07 PROCEDURE — 99222 1ST HOSP IP/OBS MODERATE 55: CPT | Performed by: STUDENT IN AN ORGANIZED HEALTH CARE EDUCATION/TRAINING PROGRAM

## 2023-08-07 RX ORDER — ONDANSETRON 2 MG/ML
4 INJECTION INTRAMUSCULAR; INTRAVENOUS EVERY 6 HOURS PRN
Status: DISCONTINUED | OUTPATIENT
Start: 2023-08-07 | End: 2023-08-08 | Stop reason: HOSPADM

## 2023-08-07 RX ORDER — ASPIRIN 81 MG/1
81 TABLET, CHEWABLE ORAL ONCE
Status: COMPLETED | OUTPATIENT
Start: 2023-08-07 | End: 2023-08-07

## 2023-08-07 RX ORDER — AMLODIPINE BESYLATE 5 MG/1
5 TABLET ORAL DAILY
Status: DISCONTINUED | OUTPATIENT
Start: 2023-08-07 | End: 2023-08-08 | Stop reason: HOSPADM

## 2023-08-07 RX ORDER — HYDRALAZINE HCL 10 MG
5 TABLET ORAL ONCE
Status: COMPLETED | OUTPATIENT
Start: 2023-08-07 | End: 2023-08-07

## 2023-08-07 RX ORDER — ATORVASTATIN CALCIUM 40 MG/1
40 TABLET, FILM COATED ORAL EVERY EVENING
Status: DISCONTINUED | OUTPATIENT
Start: 2023-08-07 | End: 2023-08-08 | Stop reason: HOSPADM

## 2023-08-07 RX ORDER — ASPIRIN 81 MG/1
81 TABLET ORAL DAILY
Status: DISCONTINUED | OUTPATIENT
Start: 2023-08-07 | End: 2023-08-08 | Stop reason: HOSPADM

## 2023-08-07 RX ORDER — GADOBUTROL 604.72 MG/ML
10 INJECTION INTRAVENOUS ONCE
Status: COMPLETED | OUTPATIENT
Start: 2023-08-07 | End: 2023-08-07

## 2023-08-07 RX ORDER — ONDANSETRON 4 MG/1
4 TABLET, ORALLY DISINTEGRATING ORAL EVERY 6 HOURS PRN
Status: DISCONTINUED | OUTPATIENT
Start: 2023-08-07 | End: 2023-08-08 | Stop reason: HOSPADM

## 2023-08-07 RX ADMIN — ASPIRIN 81 MG: 81 TABLET, CHEWABLE ORAL at 15:40

## 2023-08-07 RX ADMIN — GADOBUTROL 10 ML: 604.72 INJECTION INTRAVENOUS at 14:17

## 2023-08-07 RX ADMIN — ATORVASTATIN CALCIUM 40 MG: 40 TABLET, FILM COATED ORAL at 19:49

## 2023-08-07 RX ADMIN — HYDRALAZINE HYDROCHLORIDE 5 MG: 10 TABLET, FILM COATED ORAL at 18:21

## 2023-08-07 ASSESSMENT — ACTIVITIES OF DAILY LIVING (ADL)
ADLS_ACUITY_SCORE: 20
ADLS_ACUITY_SCORE: 20
ADLS_ACUITY_SCORE: 35
ADLS_ACUITY_SCORE: 20
DEPENDENT_IADLS:: INDEPENDENT
ADLS_ACUITY_SCORE: 35

## 2023-08-07 NOTE — ED TRIAGE NOTES
PT reports that yesterday at 1200 she had an episode that lasted about 30 minutes of aphasia, pt denies any other symptoms during this. PT BEFAST negative at this time. VSS and ABC's intact

## 2023-08-07 NOTE — TELEPHONE ENCOUNTER
Per Dr. Hood patient has been advised to present now to ER for evaluation. NATALIE Guerrier R.N.

## 2023-08-07 NOTE — ED PROVIDER NOTES
History     Chief Complaint:  Aphasia     The history is provided by the patient.      Vicky Kang is a 88 year old female presented with with history of hypertension and hyperlipidemia presenting to the emergency department for evaluation of aphasia. Vicky explains that she experienced a 30 to 40 minute long episode of aphasia while at lunch yesterday. She explains that she was able to read the menu accurately but was unable to correctly form words. She denies dizziness. Vicky adds that she had not drank much water the day before and drank quite a bit while at lunch. She denies a history of aphasia or stroke. Denies a history of atrial fibrillation. Denies a history of diabetes. Denies blood thinners. Denies a history of smoking. Denies asymmetric extremity changes. Her daughter endorses no gait changes.     Independent Historian:   None - Patient Only    Review of External Notes:   na     Medications:    Norvasc  Hyzaar  Toprol XL  Pravachol  Fosamax    Past Medical History:    Abnormal finding on thyroid function test  Hyperlipidemia  Hypertension  Neuropathy  Subclinical hypothyroidism  Thyroid nodule    Past Surgical History:    Cataract IOL, RT/LT  Throat cyst removal  Phacoemulsification clear cornea with standard intraocular lens implant  Vitrectomy anterior    Physical Exam   Patient Vitals for the past 24 hrs:   BP Temp Temp src Pulse Resp SpO2 Weight   08/07/23 1553 -- -- -- -- -- 96 % --   08/07/23 1545 (!) 154/74 -- -- -- -- 94 % --   08/07/23 1143 (!) 173/74 98.1  F (36.7  C) Temporal 72 18 98 % 61.1 kg (134 lb 11.2 oz)      Physical Exam  GENERAL: well developed, pleasant  HEAD: atraumatic  EYES: pupils reactive, extraocular muscles intact, conjunctivae normal  ENT:  mucus membranes moist  NECK:  trachea midline, normal range of motion  RESPIRATORY: no tachypnea, breath sounds clear to auscultation   CVS: normal S1/S2, no murmurs, intact distal pulses  ABDOMEN: soft, nontender,  nondistention  MUSCULOSKELETAL: no deformities  SKIN: warm and dry, no acute rashes or ulceration  NEURO: GCS 15, cranial nerves intact, alert and oriented x3  PSYCH:  Mood/affect normal    Emergency Department Course   ECG  ECG results from 08/07/23   EKG 12-lead, tracing only     Value    Systolic Blood Pressure     Diastolic Blood Pressure     Ventricular Rate 62    Atrial Rate 62    KS Interval 172    QRS Duration 88        QTc 440    P Axis 62    R AXIS 25    T Axis 41    Interpretation ECG      Sinus rhythm  Nonspecific ST abnormality  Abnormal ECG  When compared with ECG of 22-FEB-2020 10:19,  Vent. rate has decreased BY  30 BPM  ST no longer depressed in Anterior leads       Imaging:  MRA Neck (Carotids) wo & w Contrast   Final Result   IMPRESSION:   HEAD MRI:   1.  No mass hemorrhage or stroke.   2.  Mild presumed chronic small vessel ischemic change with mild to   moderate generalized volume loss.      HEAD MRA:   1.  Normal head MRA.   2.  No significant stenosis.  No aneurysm or vascular malformation.      NECK MRA:   1.  Normal neck MRA for age.   2.  No significant stenosis as per NASCET criteria.             KARLA ANN MD            SYSTEM ID:  [id number]      MRA Brain ([place]) wo Contrast   Final Result   IMPRESSION:   HEAD MRI:   1.  No mass hemorrhage or stroke.   2.  Mild presumed chronic small vessel ischemic change with mild to   moderate generalized volume loss.      HEAD MRA:   1.  Normal head MRA.   2.  No significant stenosis.  No aneurysm or vascular malformation.      NECK MRA:   1.  Normal neck MRA for age.   2.  No significant stenosis as per NASCET criteria.             KARLA ANN MD            SYSTEM ID:  [id number]      MR Brain w/o & w Contrast   Final Result   IMPRESSION:   HEAD MRI:   1.  No mass hemorrhage or stroke.   2.  Mild presumed chronic small vessel ischemic change with mild to   moderate generalized volume loss.      HEAD MRA:   1.  Normal head MRA.    2.  No significant stenosis.  No aneurysm or vascular malformation.      NECK MRA:   1.  Normal neck MRA for age.   2.  No significant stenosis as per NASCET criteria.             KARLA ANN MD            SYSTEM ID:  [id number]         Report per radiology    Laboratory:  Labs Ordered and Resulted from Time of ED Arrival to Time of ED Departure   BASIC METABOLIC PANEL - Abnormal       Result Value    Sodium 142      Potassium 4.2      Chloride 101      Carbon Dioxide (CO2) 30 (*)     Anion Gap 11      Urea Nitrogen 11.6      Creatinine 0.65      Calcium 9.7      Glucose 153 (*)     GFR Estimate 84     CBC WITH PLATELETS AND DIFFERENTIAL    WBC Count 9.6      RBC Count 4.52      Hemoglobin 14.6      Hematocrit 42.2      MCV 93      MCH 32.3      MCHC 34.6      RDW 12.0      Platelet Count 340      % Neutrophils 71      % Lymphocytes 21      % Monocytes 6      % Eosinophils 1      % Basophils 1      % Immature Granulocytes 0      NRBCs per 100 WBC 0      Absolute Neutrophils 6.9      Absolute Lymphocytes 2.0      Absolute Monocytes 0.5      Absolute Eosinophils 0.1      Absolute Basophils 0.1      Absolute Immature Granulocytes 0.0      Absolute NRBCs 0.0       Emergency Department Course & Assessments:       Interventions:  Medications   gadobutrol (GADAVIST) injection 10 mL (10 mLs Intravenous $Given 8/7/23 1417)   aspirin (ASA) chewable tablet 81 mg (81 mg Oral $Given 8/7/23 1540)      Assessments:  1331 I obtained history and examined the patient as noted above.   1536 I discussed findings and admission with the patient. All questions answered.     Independent Interpretation (X-rays, CTs, rhythm strip):  None    Consultations/Discussion of Management or Tests:  ED Course as of 08/07/23 1604   Mon Aug 07, 2023   1530 I spoke with Dr. Henderson, stroke neurology, regarding the patient's history and presentation in the emergency department today.      1602 I spoke with Dr. Garcia of the hospitalist team regarding  the patient, who accepted the patient for admission.       Social Determinants of Health affecting care:   None    Disposition:  The patient was admitted to the hospital under the care of Dr. Garcia.     Impression & Plan    Decision Making:    Patient presents with episode of difficulty with speech that occurred yesterday last about 30 minutes and has now subsided.  She has no history of TIA or stroke and is not currently on aspirin or other blood thinners.  She has been told that she has had some tachycardia but never atrial fibrillation.  Workup with MRI/MRA is normal.  Spoke with stroke neurology was suggesting admission for further completion of the stroke workup.  Patient is given an aspirin.  Spoke with the hospitalist regarding admission.    Diagnosis:    ICD-10-CM    1. TIA (transient ischemic attack)  G45.9          Scribe Disclosure:  I, Acacia Archer, am serving as a scribe at 3:13 PM on 8/7/2023 to document services personally performed by Felix Potts MD based on my observations and the provider's statements to me.   8/7/2023   Felix Potts MD Adams, Shaun L, MD  08/07/23 [id number]

## 2023-08-07 NOTE — PHARMACY-ADMISSION MEDICATION HISTORY
Pharmacist Admission Medication History    Admission medication history is complete. The information provided in this note is only as accurate as the sources available at the time of the update.    Medication reconciliation/reorder completed by provider prior to medication history? No    Information Source(s): Patient via in-person    Pertinent Information: none    Changes made to PTA medication list:  Added: None  Deleted: None  Changed: None    Medication Affordability:  Not including over the counter (OTC) medications, was there a time in the past 3 months when you did not take your medications as prescribed because of cost?: No    Allergies reviewed with patient and updates made in EHR: yes    Medication History Completed By: Ja Chow RPH 8/7/2023 4:01 PM    Prior to Admission medications    Medication Sig Last Dose Taking? Auth Provider Long Term End Date   amLODIPine (NORVASC) 5 MG tablet TAKE 1 TABLET BY MOUTH EVERY DAY 8/7/2023 at am Yes Leatha Conroy MD Yes    losartan-hydrochlorothiazide (HYZAAR) 50-12.5 MG tablet Take 1 tablet by mouth daily 8/7/2023 at am Yes Leatha Conroy MD Yes    metoprolol succinate ER (TOPROL XL) 50 MG 24 hr tablet Take 1 tablet (50 mg) by mouth 2 times daily 8/7/2023 at x1 Yes Leatha Conroy MD Yes    Multiple Vitamins-Minerals (WOMENS 50+ MULTI VITAMIN/MIN PO) Take 1 tablet by mouth daily 8/7/2023 at am Yes Reported, Patient     pravastatin (PRAVACHOL) 20 MG tablet Take 1 tablet (20 mg) by mouth daily 8/6/2023 at hs Yes Leatha Conroy MD Yes    potassium chloride ER (K-TAB/KLOR-CON) 10 MEQ CR tablet Take 1 tablet (10 mEq) by mouth daily  Patient not taking: Reported on 8/7/2023 Not Taking  Leatha Conroy MD

## 2023-08-07 NOTE — CONSULTS
"[facility]    Stroke Telephone Note    I was called by Felix Potts on 08/07/23 regarding patient Vicky Kang. The patient is a 88 year old female who was had an episode of garbled speech yesterday that lasted 30 minutes. In the ED today, her neurological examination is normal.    BP (!) 173/74   Pulse 72   Temp 98.1  F (36.7  C) (Temporal)   Resp 18   Wt 61.1 kg (134 lb 11.2 oz)   LMP  (LMP Unknown)   SpO2 98%   BMI 24.80 kg/m       Imaging Findings  MRI head: no stroke   MRA head/neck: no stenosis or occlusion    Impression  Transient ischemic attack    Recommendations  - Neurochecks and Vital Signs every 4 hours   - Daily aspirin 81 mg for secondary stroke prevention  - Statin: atorvastatin 40  - BP goal: normotensive  - TTE without Bubble Study  - 24-hour Telemetry  - Bedside Glucose Monitoring  - A1c, Lipid Panel  - PT/OT/SLP  - Stroke Education  - Euthermia, Euglycemia      My recommendations are based on the information provided over the phone by Vicky Kang's in-person providers. They are not intended to replace the clinical judgment of her in-person providers. I was not requested to personally see or examine the patient at this time.      Alfonso Henderson MD, Msc, FAVIKY, FAAN   of Neurology  [facility]     08/07/2023 3:37 PM  To page me or covering stroke neurology team member, click here: AMCOM  Choose \"On Call\" tab at top, then search dropdown box for \"Neurology Adult\" & press Enter, look for Neuro ICU/Stroke      "

## 2023-08-07 NOTE — CONSULTS
Care Management Initial Consult    General Information  Assessment completed with: Patient, Patient  Type of CM/SW Visit: Initial Assessment    Primary Care Provider verified and updated as needed: No   Readmission within the last 30 days: no previous admission in last 30 days         Advance Care Planning:            Communication Assessment  Patient's communication style: spoken language (English or Bilingual)             Cognitive  Cognitive/Neuro/Behavioral: WDL, all (Pt states that yesterday she had a 30 min episode where she had trouble speaking. Denies any numbness/tingling or other neuro symptoms)  Level of Consciousness: alert  Arousal Level: opens eyes spontaneously  Orientation: oriented x 4  Mood/Behavior: calm, cooperative     Speech: clear    Living Environment:   People in home: alone     Current living Arrangements: independent living facility      Able to return to prior arrangements: yes       Family/Social Support:  Care provided by: self  Provides care for:    Marital Status:   Children          Description of Support System: Supportive         Current Resources:   Patient receiving home care services:       Community Resources:    Equipment currently used at home:    Supplies currently used at home:      Employment/Financial:  Employment Status:          Financial Concerns:     Referral to Financial Worker: No       Does the patient's insurance plan have a 3 day qualifying hospital stay waiver?  Yes   Will the waiver be used for post-acute placement? No    Lifestyle & Psychosocial Needs:  Social Determinants of Health     Tobacco Use: Low Risk  (7/21/2023)    Patient History     Smoking Tobacco Use: Never     Smokeless Tobacco Use: Never     Passive Exposure: Never   Alcohol Use: Not on file   Financial Resource Strain: Not on file   Food Insecurity: Not on file   Transportation Needs: Not on file   Physical Activity: Not on file   Stress: Not on file   Social Connections: Not on file    Intimate Partner Violence: Not on file   Depression: Not at risk (7/21/2023)    PHQ-2     PHQ-2 Score: 0   Housing Stability: Not on file       Functional Status:  Prior to admission patient needed assistance:   Dependent ADLs:: Independent  Dependent IADLs:: Independent       Mental Health Status:  Mental Health Status: No Current Concerns       Chemical Dependency Status:  Chemical Dependency Status: No Current Concerns             Values/Beliefs:  Spiritual, Cultural Beliefs, Moravian Practices, Values that affect care:                 Care Management Discharge Note    Discharge Date: 08/08/2023       Discharge Disposition: Home    Discharge Services: Other (see comment)    Discharge DME: None    Discharge Transportation:      Private pay costs discussed: Not applicable    Does the patient's insurance plan have a 3 day qualifying hospital stay waiver?  Yes   Will the waiver be used for post-acute placement? No    PAS Confirmation Code:  n/a  Patient/family educated on Medicare website which has current facility and service quality ratings:  n/a    Education Provided on the Discharge Plan:  n/a  Persons Notified of Discharge Plans: Patient  Patient/Family in Agreement with the Plan: yes    Handoff Referral Completed: No    Additional Information:  SW met with patient at bedside while in the ED. Patient lives at [facility] in \[facility]"" and is completely independent. Patient feels her needs are well met and she does not need any resources at this time.     Patient has a son and daughter that are supportive of her.     No further needs identified by SW. Please consult care management if needs arise.     DANETTE Rodriguez, LGSW  Emergency Room   Please contact the SW on the floor in which the patient is staying for any questions or concerns

## 2023-08-07 NOTE — TELEPHONE ENCOUNTER
Nurse Triage SBAR    Is this a 2nd Level Triage? YES, LICENSED PRACTITIONER REVIEW IS REQUIRED    Situation: 30-45 minute episode of garbled speech yesterday while dining out.  States some of what came out of her mouth was unintelligible.     Background: History of hypertension and hyperlipidemia.  Patient has never had episodes of garbled speech.      Assessment: Possible TIA    Protocol Recommended Disposition:   Protocol states patient should be seen in ER but this episode was yesterday about 20 hours ago and she is completely symptom free now.  She did not experienced any numbness, tingling, weakness, headache, vision changes, dizziness, balance issues and feels back to baseline now.    Recommendation: Patient advised to be seen emergently in ER if having return of speech difficulty or if having any of the other symptoms listed above.      Routed to provider    Does the patient meet one of the following criteria for ADS visit consideration? 16+ years old, with an [facility] PCP     TIP  Providers, please consider if this condition is appropriate for management at one of our Acute and Diagnostic Services sites.     If patient is a good candidate, please use dotphrase <dot>triageresponse and select Refer to ADS to document.    Reason for Disposition   [1] Loss of speech or garbled speech AND [2] sudden onset AND [3] brief (now gone)    Additional Information   Negative: [1] SEVERE weakness (i.e., unable to walk or barely able to walk, requires support) AND [2] new-onset or worsening   Negative: [1] Loss of speech or garbled speech AND [2] sudden onset AND [3] present now   Negative: [1] Weakness (i.e., paralysis, loss of muscle strength) of the face, arm / hand, or leg / foot on one side of the body AND [2] sudden onset AND [3] present now (Exception: Bell's palsy suspected [i.e., weakness only on one side of the face, developing over hours to days, no other symptoms])   Negative: [1] Numbness (i.e., loss of sensation)  of the face, arm / hand, or leg / foot on one side of the body AND [2] sudden onset AND [3] present now   Negative: Difficult to awaken or acting confused (e.g., disoriented, slurred speech)   Negative: Sounds like a life-threatening emergency to the triager   Negative: Confusion, disorientation, or hallucinations is main symptom   Negative: Neck pain is main symptom (and having weakness, numbness, or tingling in arm / hand because of neck pain)   Negative: Back pain is main symptom (and having weakness, numbness, or tingling in leg because of back pain)   Negative: Hand pain is main symptom (and having mild weakness, numbness, or tingling in hand related to hand pain)   Negative: Dizziness is main symptom   Negative: Vision loss or change is main symptom   Negative: Followed a head injury within last 3 days   Negative: Followed a neck injury within last 3 days   Negative: [1] Tingling in both hands and/or feet AND [2] breathing faster than normal AND [3] feels similar to prior panic attack or hyperventilation episode   Negative: Weakness in both sides of the body or weakness all over   Negative: Headache  (and neurologic deficit)   Negative: [1] Back pain AND [2] numbness (loss of sensation) in groin or rectal area   Negative: [1] Unable to urinate (or only a few drops) > 4 hours AND [2] bladder feels very full (e.g., palpable bladder or strong urge to urinate)   Negative: [1] Loss of bladder or bowel control (urine or bowel incontinence; wetting self, leaking stool) AND [2] new-onset   Negative: [1] Weakness (i.e., paralysis, loss of muscle strength) of the face, arm / hand, or leg / foot on one side of the body AND [2] sudden onset AND [3] brief (now gone)   Negative: [1] Numbness (i.e., loss of sensation) of the face, arm / hand, or leg / foot on one side of the body AND [2] sudden onset AND [3] brief (now gone)   Negative: Bell's palsy suspected (i.e., weakness on only one side of the face, developing over  "hours to days, no other symptoms)   Negative: Patient sounds very sick or weak to the triager    Answer Assessment - Initial Assessment Questions  1. SYMPTOM: \"What is the main symptom you are concerned about?\" (e.g., weakness, numbness)      1 day ago  2. ONSET: \"When did this start?\" (minutes, hours, days; while sleeping)      Yesterday lunch time lasting 30-45 minutes  3. LAST NORMAL: \"When was the last time you (the patient) were normal (no symptoms)?\"      After the episode yesterday lasting 30-45 minutes patient returned to baseline  4. PATTERN \"Does this come and go, or has it been constant since it started?\"  \"Is it present now?\"      1 time event yesterday, never experienced before and has had no further symptoms since yesterday.  5. CARDIAC SYMPTOMS: \"Have you had any of the following symptoms: chest pain, difficulty breathing, palpitations?\"      Denies all  6. NEUROLOGIC SYMPTOMS: \"Have you had any of the following symptoms: headache, dizziness, vision loss, double vision, changes in speech, unsteady on your feet?\"      Patient denies having headache, dizziness, vision disturbance, facial droop.  She did have difficulty getting her words out and the sounds she was making did not make sense. Her dining  just kept looking at her face for signs of a facial droop which did not happen.  7. OTHER SYMPTOMS: \"Do you have any other symptoms?\"      none  8. PREGNANCY: \"Is there any chance you are pregnant?\" \"When was your last menstrual period?\"      NA    Protocols used: Neurologic Deficit-A-AH    "

## 2023-08-07 NOTE — ED NOTES
[facility]  ED Nurse Handoff Report    ED Chief complaint: Aphasia  . ED Diagnosis:   Final diagnoses:   TIA (transient ischemic attack)       Allergies:   Allergies   Allergen Reactions    Seasonal Allergies        Code Status: Full Code    Activity level - Baseline/Home:  independent.  Activity Level - Current:   standby.   Lift room needed: No.   Bariatric: No   Needed: No   Isolation: No.   Infection: Not Applicable.     Respiratory status: Room air    Vital Signs (within 30 minutes):   Vitals:    08/07/23 1143 08/07/23 1545 08/07/23 1553   BP: (!) 173/74 (!) 154/74    Pulse: 72     Resp: 18     Temp: 98.1  F (36.7  C)     TempSrc: Temporal     SpO2: 98% 94% 96%   Weight: 61.1 kg (134 lb 11.2 oz)         Cardiac Rhythm:  ,      Pain level:    Patient confused: No.   Patient Falls Risk: nonskid shoes/slippers when out of bed and activity supervised.   Elimination Status: Has voided     Patient Report - Initial Complaint: Apashia.   Focused Assessment:  Neuro Cognitive (Adult)Cognitive/Neuro/Behavioral WDL: WDL; all (Pt states that yesterday she had a 30 min episode where she had trouble speaking. Denies any numbness/tingling or other neuro symptoms)Level of Consciousness: alertArousal Level: opens eyes spontaneouslyOrientation: oriented x 4Speech: clearMood/Behavior: calm; cooperative  Moore Coma ScaleBest Eye Response: 4-->(E4) spontaneousBest Motor Response: 6-->(M6) obeys commandsBest Verbal Response: 5-->(V5) orientedGlasgow Coma Scale Score: 15  Hand /Ankle StrengthHand , Left: strongHand , Right: strongDorsiflexion, Left: strongDorsiflexion, Right: strongPlantarflexion, Left: strongPlantarflexion, Right: strong  Coordination/AtaxiaShoulder shrug - left: Shrug shoulders and head movement side to side intactShoulder shrug - right: Shrug shoulders and head movement side to side intact  Motor StrengthLeft Upper Motor Strength: 5 - active movement against gravity  and full resistanceRight Upper Motor Strength: 5 - active movement against gravity and full resistanceLeft Lower Motor Strength: 5 - active movement against gravity and full resistanceRight Lower Motor Strength: 5 - active movement against gravity and full resistance    Abnormal Results:   Labs Ordered and Resulted from Time of ED Arrival to Time of ED Departure   BASIC METABOLIC PANEL - Abnormal       Result Value    Sodium 142      Potassium 4.2      Chloride 101      Carbon Dioxide (CO2) 30 (*)     Anion Gap 11      Urea Nitrogen 11.6      Creatinine 0.65      Calcium 9.7      Glucose 153 (*)     GFR Estimate 84     CBC WITH PLATELETS AND DIFFERENTIAL    WBC Count 9.6      RBC Count 4.52      Hemoglobin 14.6      Hematocrit 42.2      MCV 93      MCH 32.3      MCHC 34.6      RDW 12.0      Platelet Count 340      % Neutrophils 71      % Lymphocytes 21      % Monocytes 6      % Eosinophils 1      % Basophils 1      % Immature Granulocytes 0      NRBCs per 100 WBC 0      Absolute Neutrophils 6.9      Absolute Lymphocytes 2.0      Absolute Monocytes 0.5      Absolute Eosinophils 0.1      Absolute Basophils 0.1      Absolute Immature Granulocytes 0.0      Absolute NRBCs 0.0          MRA Neck (Carotids) wo & w Contrast   Final Result   IMPRESSION:   HEAD MRI:   1.  No mass hemorrhage or stroke.   2.  Mild presumed chronic small vessel ischemic change with mild to   moderate generalized volume loss.      HEAD MRA:   1.  Normal head MRA.   2.  No significant stenosis.  No aneurysm or vascular malformation.      NECK MRA:   1.  Normal neck MRA for age.   2.  No significant stenosis as per NASCET criteria.             KARLA ANN MD            SYSTEM ID:  [id number]      MRA Brain ([place]) wo Contrast   Final Result   IMPRESSION:   HEAD MRI:   1.  No mass hemorrhage or stroke.   2.  Mild presumed chronic small vessel ischemic change with mild to   moderate generalized volume loss.      HEAD MRA:   1.  Normal  head MRA.   2.  No significant stenosis.  No aneurysm or vascular malformation.      NECK MRA:   1.  Normal neck MRA for age.   2.  No significant stenosis as per NASCET criteria.             KARLA ANN MD            SYSTEM ID:  [id number]      MR Brain w/o & w Contrast   Final Result   IMPRESSION:   HEAD MRI:   1.  No mass hemorrhage or stroke.   2.  Mild presumed chronic small vessel ischemic change with mild to   moderate generalized volume loss.      HEAD MRA:   1.  Normal head MRA.   2.  No significant stenosis.  No aneurysm or vascular malformation.      NECK MRA:   1.  Normal neck MRA for age.   2.  No significant stenosis as per NASCET criteria.             KARLA ANN MD            SYSTEM ID:  [id number]          Treatments provided: aspirin 81 mg  Family Comments: Family at bedside and aware of plan of care  OBS brochure/video discussed/provided to patient:  N/A  ED Medications:   Medications   gadobutrol (GADAVIST) injection 10 mL (10 mLs Intravenous $Given 8/7/23 1417)   aspirin (ASA) chewable tablet 81 mg (81 mg Oral $Given 8/7/23 1540)       Drips infusing:  No  For the majority of the shift this patient was Green.   Interventions performed were n/a.    Sepsis treatment initiated: No    Cares/treatment/interventions/medications to be completed following ED care: continue to monitor    ED Nurse Name: Cinda Pickering RN  4:08 PM     RECEIVING UNIT ED HANDOFF REVIEW    Above ED Nurse Handoff Report was reviewed: Yes  Reviewed by: Fanny Limon RN on August 7, 2023 at 5:34 PM

## 2023-08-07 NOTE — H&P
[facility]    History and Physical - Hospitalist Service       Date of Admission:  8/7/2023    Assessment & Plan      Vicky Kang is a 88 year old female with past medical history significant for hypertension and prediabetes who presented to [facility] on 8/7/2023 with expressive aphasia and is suspected to have had a transient ischemic attack.    Transient Ischemic Attack  Expressive Aphasia  Episode of expressive aphasia for 30-45 minutes the afternoon of 8/6/2023; now resolved. No additional neurologic symptoms reported. MRI/MRA without evidence of ischemia, stenosis, or occlusion. Stroke neurology consulted from emergency department and recommending additional workup and risk factor modifications.  -Stroke Neurology consulted, appreciate recommendations  -Cardiac Telemetry  -TTE  -Neurochecks / Vitals Q4H  -Glucose checks Q4H  -Normotension, Normothermia, Euglycemia  -A1C pending  -Lipid Panel pending  -ASA 81mg daily  -Statin 40mg at bedtime  -PT/OT/SLP    Hypertension: Continue PTA amlodipine. Pending med rec. Hydralazine 5mg PO now.  Prediabetes: A1C 6.2 07/2023. Rechecking.       Diet: Regular Diet Adult    DVT Prophylaxis: Low Risk/Ambulatory with no VTE prophylaxis indicated  Smith Catheter: Not present  Lines: None     Cardiac Monitoring: None  Code Status:  Full    Clinically Significant Risk Factors Present on Admission                  # Hypertension: Noted on problem list               Disposition Plan      Expected Discharge Date: 08/08/2023      Destination: home            Scar Garcia MD  Hospitalist Service  [facility]  Securely message with [employer] (more info)  Text page via [employer] Paging/Directory     ______________________________________________________________________    Chief Complaint   Difficulty Speaking    History is obtained from the patient    History of Present Illness   Vicky Kang is a 88 year old female with past medical  "history significant for hypertension and prediabetes who presented to [facility] on 8/7/2023 with recent episode of expressive aphasia.    Patient stated that she was going to lunch with a friend on 8/6/2023 when she started \"having difficult getting words out.\" She stated when they sat at lunch she was able to read the menu and understand what people were saying to her, but she was not able speak correctly. Specifically, when ordering her lunch, she attempted to say she wanted a taco but the word was too garbled to understand. She had to point to the menu item. This apparently resolved near the end of the meal and patient was able to speak correctly again. She did not have any additional neurologic symptoms at the time. No headache, dizziness, vision changes, receptive aphasia, loss of sensory or motor function, change in bowel or bladder function. She did not have chest pain, shortness of breath, nausea, vomiting.    In the emergency department, patient was found to be afebrile. Heart rate was 60-70s. Blood pressure 154-173/70s. She had normal oxygen saturations on room air. Laboratory studies were notable for bicarbonate 30, glucose 153. MRI/MRA without evidence of stroke, stenosis or occlusion. She did have mild, chronic small vessel changes.      Past Medical History    Past Medical History:   Diagnosis Date    Abnormal finding on thyroid function test 11/10/2015    Hyperlipidemia     Hypertension     Neuropathy     Subclinical hypothyroidism 11/10/2015    Thyroid nodule 11/10/2015       Past Surgical History   Past Surgical History:   Procedure Laterality Date    CATARACT IOL, RT/LT      ENT SURGERY   Spring 2017    throat cyst removal    PHACOEMULSIFICATION CLEAR CORNEA WITH STANDARD INTRAOCULAR LENS IMPLANT  1/5/2012    Procedure:PHACOEMULSIFICATION CLEAR CORNEA WITH STANDARD INTRAOCULAR LENS IMPLANT; LEFT PHACOEMULSIFICATION CLEAR CORNEA WITH STANDARD LENS IMPLANT ; Surgeon:KNEDRA, " "TUNDE; Location: [facility]    VITRECTOMY ANTERIOR         Prior to Admission Medications   Prior to Admission Medications   Prescriptions Last Dose Informant Patient Reported? Taking?   Multiple Vitamins-Minerals (WOMENS 50+ MULTI VITAMIN/MIN PO) 8/7/2023 at am  Yes Yes   Sig: Take 1 tablet by mouth daily   amLODIPine (NORVASC) 5 MG tablet 8/7/2023 at am  No Yes   Sig: TAKE 1 TABLET BY MOUTH EVERY DAY   losartan-hydrochlorothiazide (HYZAAR) 50-12.5 MG tablet 8/7/2023 at am  No Yes   Sig: Take 1 tablet by mouth daily   metoprolol succinate ER (TOPROL XL) 50 MG 24 hr tablet 8/7/2023 at x1  No Yes   Sig: Take 1 tablet (50 mg) by mouth 2 times daily   potassium chloride ER (K-TAB/KLOR-CON) 10 MEQ CR tablet Not Taking  No No   Sig: Take 1 tablet (10 mEq) by mouth daily   Patient not taking: Reported on 8/7/2023   pravastatin (PRAVACHOL) 20 MG tablet 8/6/2023 at hs  No Yes   Sig: Take 1 tablet (20 mg) by mouth daily      Facility-Administered Medications: None        Physical Exam   Temp: 98.1  F (36.7  C) Temp src: Temporal BP: (!) 162/79 Pulse: 68   Resp: 18 SpO2: 95 % O2 Device: None (Room air)     Weight: 61.1 kg (134 lb 11.2 oz)  Estimated body mass index is 24.8 kg/m  as calculated from the following:    Height as of 7/21/23: 1.57 m (5' 1.8\").    Weight as of this encounter: 61.1 kg (134 lb 11.2 oz).    General: Very pleasant female resting comfortably in hospital bed.  Awake, alert, interactive.  HEENT: Normocephalic, atraumatic.  PERRL, EOMI.  Conjunctiva clear, sclerae anicteric.  Mucous membranes moist.  Cardiac: Regular rate and rhythm without murmur, gallop, or rub.  No peripheral edema.  Respiratory: Normal work of breathing.  Clear to auscultation bilaterally without wheezing, rales, or rhonchi.  GI: Normal, active bowel sounds.  Abdomen soft, nontender, nondistended.  : Deferred.  Musculoskeletal: Moving all extremities appropriately.  Skin: No rashes or abrasions on exposed skin.  Neurologic: Alert and " oriented x4.  Cranial nerves II through XII grossly intact. Speech normal.  Psychologic: Appropriate mood and affect.      Medical Decision Making       45 MINUTES SPENT BY ME on the date of service doing chart review, history, exam, documentation & further activities per the note.      Data     I have personally reviewed the following data over the past 24 hrs:    9.6  \   14.6   / 340     142 101 11.6 /  153 (H)   4.2 30 (H) 0.65 \       Imaging results reviewed over the past 24 hrs:   Recent Results (from the past 24 hour(s))   MR Brain w/o & w Contrast    Narrative    MRI OF THE BRAIN WITHOUT AND WITH CONTRAST  MRA OF THE HEAD WITHOUT CONTRAST  MRA OF THE NECK WITHOUT AND WITH CONTRAST    8/7/2023 2:59 PM     HISTORY:  Acute neuro deficit, stroke suspected     COMPARISON: Head CT 10/26/2013.    TECHNIQUE:   BRAIN: Without and with IV contrast.     MRA: 3-D time-of-flight MR angiography of the major arteries at the  base of the brain was performed without contrast.  2D time-of-flight  MRA without contrast with superior and inferior saturation bands and  3D T1-weighted postgadolinium MRA of the neck/cervical vessels were  also obtained. MIP reconstruction of all MR angiographic data was  performed.    CONTRAST: 10mL GADAVIST IV.    FINDINGS:   HEAD MRI:  INTRACRANIAL CONTENTS: No acute or subacute infarct. No mass, acute  hemorrhage, or extra-axial fluid collections. Mild presumed chronic  small vessel ischemic change. Mild/moderate generalized volume loss.  Normal position of the cerebellar tonsils. No pathologic enhancement.    SELLA: No significant abnormality accounting for technique.    OSSEOUS STRUCTURES/SOFT TISSUES: No aggressive osseous lesion  involving the calvarium, skull base, or visualized upper cervical  spine. The major intracranial vascular flow voids are maintained.    ORBITS: Bilateral cataract resections.    SINUSES/MASTOIDS: No significant paranasal sinus mucosal disease. No  significant  middle ear or mastoid effusion.    HEAD MRA:  ANTERIOR CIRCULATION: No significant stenosis or occlusion. Standard  [place] anatomy.    POSTERIOR CIRCULATION: No significant stenosis or occlusion. Normal  vertebral arteries. The left is dominant. Normal basilar artery and  posterior cerebral arteries.    ANEURYSM/VASCULAR MALFORMATION: None.    NECK MRA:  RIGHT CAROTID: No measurable stenosis in the right ICA based on NASCET  criteria.    LEFT CAROTID: No measurable stenosis in the left ICA based on NASCET  criteria.     VERTEBRAL ARTERIES: The vertebral arteries are normal. The left is  dominant.     AORTIC ARCH: Classic aortic arch anatomy with no significant stenosis  at the origin of the great vessels.       Impression    IMPRESSION:  HEAD MRI:  1.  No mass hemorrhage or stroke.  2.  Mild presumed chronic small vessel ischemic change with mild to  moderate generalized volume loss.    HEAD MRA:  1.  Normal head MRA.  2.  No significant stenosis.  No aneurysm or vascular malformation.    NECK MRA:  1.  Normal neck MRA for age.  2.  No significant stenosis as per NASCET criteria.         KARLA ANN MD         SYSTEM ID:  [id number]   MRA Brain ([place]) wo Contrast    Narrative    MRI OF THE BRAIN WITHOUT AND WITH CONTRAST  MRA OF THE HEAD WITHOUT CONTRAST  MRA OF THE NECK WITHOUT AND WITH CONTRAST    8/7/2023 2:59 PM     HISTORY:  Acute neuro deficit, stroke suspected     COMPARISON: Head CT 10/26/2013.    TECHNIQUE:   BRAIN: Without and with IV contrast.     MRA: 3-D time-of-flight MR angiography of the major arteries at the  base of the brain was performed without contrast.  2D time-of-flight  MRA without contrast with superior and inferior saturation bands and  3D T1-weighted postgadolinium MRA of the neck/cervical vessels were  also obtained. MIP reconstruction of all MR angiographic data was  performed.    CONTRAST: 10mL GADAVIST IV.    FINDINGS:   HEAD MRI:  INTRACRANIAL CONTENTS: No  acute or subacute infarct. No mass, acute  hemorrhage, or extra-axial fluid collections. Mild presumed chronic  small vessel ischemic change. Mild/moderate generalized volume loss.  Normal position of the cerebellar tonsils. No pathologic enhancement.    SELLA: No significant abnormality accounting for technique.    OSSEOUS STRUCTURES/SOFT TISSUES: No aggressive osseous lesion  involving the calvarium, skull base, or visualized upper cervical  spine. The major intracranial vascular flow voids are maintained.    ORBITS: Bilateral cataract resections.    SINUSES/MASTOIDS: No significant paranasal sinus mucosal disease. No  significant middle ear or mastoid effusion.    HEAD MRA:  ANTERIOR CIRCULATION: No significant stenosis or occlusion. Standard  [place] anatomy.    POSTERIOR CIRCULATION: No significant stenosis or occlusion. Normal  vertebral arteries. The left is dominant. Normal basilar artery and  posterior cerebral arteries.    ANEURYSM/VASCULAR MALFORMATION: None.    NECK MRA:  RIGHT CAROTID: No measurable stenosis in the right ICA based on NASCET  criteria.    LEFT CAROTID: No measurable stenosis in the left ICA based on NASCET  criteria.     VERTEBRAL ARTERIES: The vertebral arteries are normal. The left is  dominant.     AORTIC ARCH: Classic aortic arch anatomy with no significant stenosis  at the origin of the great vessels.       Impression    IMPRESSION:  HEAD MRI:  1.  No mass hemorrhage or stroke.  2.  Mild presumed chronic small vessel ischemic change with mild to  moderate generalized volume loss.    HEAD MRA:  1.  Normal head MRA.  2.  No significant stenosis.  No aneurysm or vascular malformation.    NECK MRA:  1.  Normal neck MRA for age.  2.  No significant stenosis as per NASCET criteria.         KARLA ANN MD         SYSTEM ID:  [id number]   MRA Neck (Carotids) wo & w Contrast    Narrative    MRI OF THE BRAIN WITHOUT AND WITH CONTRAST  MRA OF THE HEAD WITHOUT CONTRAST  MRA OF THE  NECK WITHOUT AND WITH CONTRAST    8/7/2023 2:59 PM     HISTORY:  Acute neuro deficit, stroke suspected     COMPARISON: Head CT 10/26/2013.    TECHNIQUE:   BRAIN: Without and with IV contrast.     MRA: 3-D time-of-flight MR angiography of the major arteries at the  base of the brain was performed without contrast.  2D time-of-flight  MRA without contrast with superior and inferior saturation bands and  3D T1-weighted postgadolinium MRA of the neck/cervical vessels were  also obtained. MIP reconstruction of all MR angiographic data was  performed.    CONTRAST: 10mL GADAVIST IV.    FINDINGS:   HEAD MRI:  INTRACRANIAL CONTENTS: No acute or subacute infarct. No mass, acute  hemorrhage, or extra-axial fluid collections. Mild presumed chronic  small vessel ischemic change. Mild/moderate generalized volume loss.  Normal position of the cerebellar tonsils. No pathologic enhancement.    SELLA: No significant abnormality accounting for technique.    OSSEOUS STRUCTURES/SOFT TISSUES: No aggressive osseous lesion  involving the calvarium, skull base, or visualized upper cervical  spine. The major intracranial vascular flow voids are maintained.    ORBITS: Bilateral cataract resections.    SINUSES/MASTOIDS: No significant paranasal sinus mucosal disease. No  significant middle ear or mastoid effusion.    HEAD MRA:  ANTERIOR CIRCULATION: No significant stenosis or occlusion. Standard  [place] anatomy.    POSTERIOR CIRCULATION: No significant stenosis or occlusion. Normal  vertebral arteries. The left is dominant. Normal basilar artery and  posterior cerebral arteries.    ANEURYSM/VASCULAR MALFORMATION: None.    NECK MRA:  RIGHT CAROTID: No measurable stenosis in the right ICA based on NASCET  criteria.    LEFT CAROTID: No measurable stenosis in the left ICA based on NASCET  criteria.     VERTEBRAL ARTERIES: The vertebral arteries are normal. The left is  dominant.     AORTIC ARCH: Classic aortic arch anatomy with no  significant stenosis  at the origin of the great vessels.       Impression    IMPRESSION:  HEAD MRI:  1.  No mass hemorrhage or stroke.  2.  Mild presumed chronic small vessel ischemic change with mild to  moderate generalized volume loss.    HEAD MRA:  1.  Normal head MRA.  2.  No significant stenosis.  No aneurysm or vascular malformation.    NECK MRA:  1.  Normal neck MRA for age.  2.  No significant stenosis as per NASCET criteria.         KARLA ANN MD         SYSTEM ID:  [id number]

## 2023-08-07 NOTE — TELEPHONE ENCOUNTER
Scheduled patient to see primary care provider tomorrow morning at 8:00 a.m. with instructions to be seen in ER if return of sypmtoms or new symptoms.  NATALIE Guerrier R.N.

## 2023-08-08 ENCOUNTER — APPOINTMENT (OUTPATIENT)
Dept: CARDIOLOGY | Facility: CLINIC | Age: 88
End: 2023-08-08
Attending: STUDENT IN AN ORGANIZED HEALTH CARE EDUCATION/TRAINING PROGRAM
Payer: COMMERCIAL

## 2023-08-08 ENCOUNTER — APPOINTMENT (OUTPATIENT)
Dept: CARDIOLOGY | Facility: CLINIC | Age: 88
End: 2023-08-08
Attending: PHYSICIAN ASSISTANT
Payer: COMMERCIAL

## 2023-08-08 VITALS
HEART RATE: 71 BPM | HEIGHT: 61 IN | OXYGEN SATURATION: 94 % | SYSTOLIC BLOOD PRESSURE: 155 MMHG | DIASTOLIC BLOOD PRESSURE: 71 MMHG | BODY MASS INDEX: 25.24 KG/M2 | WEIGHT: 133.7 LBS | RESPIRATION RATE: 16 BRPM | TEMPERATURE: 97.6 F

## 2023-08-08 LAB
ANION GAP SERPL CALCULATED.3IONS-SCNC: 11 MMOL/L (ref 7–15)
ATRIAL RATE - MUSE: 62 BPM
BUN SERPL-MCNC: 14.5 MG/DL (ref 8–23)
CALCIUM SERPL-MCNC: 9 MG/DL (ref 8.8–10.2)
CHLORIDE SERPL-SCNC: 103 MMOL/L (ref 98–107)
CHOLEST SERPL-MCNC: 166 MG/DL
CREAT SERPL-MCNC: 0.68 MG/DL (ref 0.51–0.95)
DEPRECATED HCO3 PLAS-SCNC: 27 MMOL/L (ref 22–29)
DIASTOLIC BLOOD PRESSURE - MUSE: NORMAL MMHG
ERYTHROCYTE [DISTWIDTH] IN BLOOD BY AUTOMATED COUNT: 12 % (ref 10–15)
GFR SERPL CREATININE-BSD FRML MDRD: 83 ML/MIN/1.73M2
GLUCOSE BLDC GLUCOMTR-MCNC: 106 MG/DL (ref 70–99)
GLUCOSE BLDC GLUCOMTR-MCNC: 120 MG/DL (ref 70–99)
GLUCOSE SERPL-MCNC: 112 MG/DL (ref 70–99)
HCT VFR BLD AUTO: 38.1 % (ref 35–47)
HDLC SERPL-MCNC: 40 MG/DL
HGB BLD-MCNC: 12.9 G/DL (ref 11.7–15.7)
INTERPRETATION ECG - MUSE: NORMAL
LDLC SERPL CALC-MCNC: 88 MG/DL
MCH RBC QN AUTO: 31.7 PG (ref 26.5–33)
MCHC RBC AUTO-ENTMCNC: 33.9 G/DL (ref 31.5–36.5)
MCV RBC AUTO: 94 FL (ref 78–100)
NONHDLC SERPL-MCNC: 126 MG/DL
P AXIS - MUSE: 62 DEGREES
PLATELET # BLD AUTO: 287 10E3/UL (ref 150–450)
POTASSIUM SERPL-SCNC: 3.5 MMOL/L (ref 3.4–5.3)
PR INTERVAL - MUSE: 172 MS
QRS DURATION - MUSE: 88 MS
QT - MUSE: 434 MS
QTC - MUSE: 440 MS
R AXIS - MUSE: 25 DEGREES
RBC # BLD AUTO: 4.07 10E6/UL (ref 3.8–5.2)
SODIUM SERPL-SCNC: 141 MMOL/L (ref 136–145)
SYSTOLIC BLOOD PRESSURE - MUSE: NORMAL MMHG
T AXIS - MUSE: 41 DEGREES
TRIGL SERPL-MCNC: 191 MG/DL
VENTRICULAR RATE- MUSE: 62 BPM
WBC # BLD AUTO: 6.6 10E3/UL (ref 4–11)

## 2023-08-08 PROCEDURE — G0378 HOSPITAL OBSERVATION PER HR: HCPCS

## 2023-08-08 PROCEDURE — 250N000013 HC RX MED GY IP 250 OP 250 PS 637: Performed by: STUDENT IN AN ORGANIZED HEALTH CARE EDUCATION/TRAINING PROGRAM

## 2023-08-08 PROCEDURE — 82962 GLUCOSE BLOOD TEST: CPT

## 2023-08-08 PROCEDURE — 99238 HOSP IP/OBS DSCHRG MGMT 30/<: CPT | Performed by: PHYSICIAN ASSISTANT

## 2023-08-08 PROCEDURE — 93306 TTE W/DOPPLER COMPLETE: CPT | Mod: 26 | Performed by: INTERNAL MEDICINE

## 2023-08-08 PROCEDURE — G0426 INPT/ED TELECONSULT50: HCPCS | Mod: G0 | Performed by: PHYSICIAN ASSISTANT

## 2023-08-08 PROCEDURE — 93270 REMOTE 30 DAY ECG REV/REPORT: CPT

## 2023-08-08 PROCEDURE — 93306 TTE W/DOPPLER COMPLETE: CPT

## 2023-08-08 PROCEDURE — 93272 ECG/REVIEW INTERPRET ONLY: CPT | Performed by: INTERNAL MEDICINE

## 2023-08-08 PROCEDURE — 85027 COMPLETE CBC AUTOMATED: CPT | Performed by: STUDENT IN AN ORGANIZED HEALTH CARE EDUCATION/TRAINING PROGRAM

## 2023-08-08 PROCEDURE — 80061 LIPID PANEL: CPT | Performed by: STUDENT IN AN ORGANIZED HEALTH CARE EDUCATION/TRAINING PROGRAM

## 2023-08-08 PROCEDURE — 36415 COLL VENOUS BLD VENIPUNCTURE: CPT | Performed by: STUDENT IN AN ORGANIZED HEALTH CARE EDUCATION/TRAINING PROGRAM

## 2023-08-08 PROCEDURE — 999N000111 HC STATISTIC OT IP EVAL DEFER

## 2023-08-08 PROCEDURE — 80048 BASIC METABOLIC PNL TOTAL CA: CPT | Performed by: STUDENT IN AN ORGANIZED HEALTH CARE EDUCATION/TRAINING PROGRAM

## 2023-08-08 PROCEDURE — 999N000226 HC STATISTIC SLP IP EVAL DEFER

## 2023-08-08 RX ORDER — ASPIRIN 81 MG/1
81 TABLET ORAL DAILY
Start: 2023-08-09

## 2023-08-08 RX ADMIN — AMLODIPINE BESYLATE 5 MG: 5 TABLET ORAL at 08:03

## 2023-08-08 RX ADMIN — ASPIRIN 81 MG: 81 TABLET, COATED ORAL at 08:03

## 2023-08-08 ASSESSMENT — ACTIVITIES OF DAILY LIVING (ADL)
ADLS_ACUITY_SCORE: 28

## 2023-08-08 NOTE — PROGRESS NOTES
PT: Orders received. Chart reviewed and discussed with care team.  PT not indicated due to pt independent with mobility.  Defer discharge recommendations to medical team.  Will complete orders.

## 2023-08-08 NOTE — CONSULTS
[facility]    Stroke Consult Note    Reason for Consult:  TIA    Chief Complaint: Aphasia       HPI  Vicky Kang is a 88 year old female with PMH HTN, preDM, presented with a transient episode of expressive aphasia the day before presentation. She was going to lunch with a friend and was talking when suddenly the words were not coming out right - describes that she knew what she wanted to say but could not get the words out. She was able to read the menu but had a hard time saying out loud what she wanted to order. Symptoms lasted ~30-45 minutes and resolved without recurrence. Presenting /74.    States BP is usually well controlled in 130s SBP.    TIA Evaluation Summarized    MRI and/or Head CT MRI: no acute stroke, mild chronic SVID   Intracranial Vasculature MRA head: no significant stenosis   Cervical Vasculature MRA neck: no significant setnosis     Echocardiogram No EF documented in TTE report, normal LA size no significant valve abnormalities   EKG/Telemetry SR   Other Testing Not Applicable      LDL 8/8/2023: 88 mg/dL   A1C 7/21/2023: 6.2 %       ABCD2 Patients Score   Age ? 60 years 1 point 1   Blood Pressure    SBP ? 140 or DBP ?  90    1 point 1   Clinical Features    - Unilateral weakness    - Speech disturbance w/o weakness    - Other    2 points  1 point    0 points 1   Duration of symptoms    ? 60 minutes    10-59 minutes    < 10 minutes   2 points  1 point  0 points 1   Diabetes  1 point 0   Patient s ABCD2 Score (0-7) = 4       Impression  Transient ischemic attack      Recommendations   - Neurochecks and Vital Signs every 4 hours   - Daily aspirin 81 mg for secondary stroke prevention  - Statin: continue PTA pravastsatin, LDL within goal range of 40-70  - Goal normotension  - 24-hour Telemetry  - 30 day cardiac monitor to evaluate for atrial fibrillatin (ordered)  - Bedside Glucose Monitoring  - Nutrition: per nursing  - PT/OT/SLP as indicated  - Stroke  "Education  - Depression Screen  - Apnea screening questions  - Euthermia, Euglycemia    Patient Follow-up    - in 6-8 weeks with general neurology ([phone]) (ordered)    Thank you for this consult. No further stroke evaluation is recommended, so we will sign off. Please contact us with any additional questions.    Lisa Ramirez PA-C  Vascular Neurology    To page me or covering stroke neurology team member, click here: [facility]  Choose \"On Call\" tab at top, then select \"NEUROLOGY/ALL SITES\" from middle drop-down box, press Enter, then look for \"stroke\" or \"telestroke\" for your site.  _____________________________________________________    Clinically Significant Risk Factors Present on Admission                    # Hypertension: Noted on problem list      # Overweight: Estimated body mass index is 25.26 kg/m  as calculated from the following:    Height as of this encounter: 1.549 m (5' 1\").    Weight as of this encounter: 60.6 kg (133 lb 11.2 oz).            Past Medical History    Past Medical History:   Diagnosis Date    Abnormal finding on thyroid function test 11/10/2015    Hyperlipidemia     Hypertension     Neuropathy     Subclinical hypothyroidism 11/10/2015    Thyroid nodule 11/10/2015     Medications   Home Meds  Prior to Admission medications    Medication Sig Start Date End Date Taking? Authorizing Provider   amLODIPine (NORVASC) 5 MG tablet TAKE 1 TABLET BY MOUTH EVERY DAY 7/21/23  Yes Leatha Conroy MD   losartan-hydrochlorothiazide (HYZAAR) 50-12.5 MG tablet Take 1 tablet by mouth daily 7/21/23  Yes Leatha Conroy MD   metoprolol succinate ER (TOPROL XL) 50 MG 24 hr tablet Take 1 tablet (50 mg) by mouth 2 times daily 7/21/23  Yes Leatha Conroy MD   Multiple Vitamins-Minerals (WOMENS 50+ MULTI VITAMIN/MIN PO) Take 1 tablet by mouth daily   Yes Reported, Patient   pravastatin (PRAVACHOL) 20 MG tablet Take 1 tablet (20 mg) by mouth daily 7/21/23  Yes " Leatha Conroy MD   potassium chloride ER (K-TAB/KLOR-CON) 10 MEQ CR tablet Take 1 tablet (10 mEq) by mouth daily  Patient not taking: Reported on 8/7/2023 8/1/23   Leatha Conroy MD       Scheduled Meds   amLODIPine  5 mg Oral Daily    aspirin  81 mg Oral Daily    atorvastatin  40 mg Oral QPM       Infusion Meds      Allergies   Allergies   Allergen Reactions    Seasonal Allergies           PHYSICAL EXAMINATION   Temp:  [96.8  F (36  C)-97.9  F (36.6  C)] 97.6  F (36.4  C)  Pulse:  [54-94] 63  Resp:  [15-18] 18  BP: (129-162)/(50-88) 144/56  SpO2:  [92 %-97 %] 94 %    General Exam  General:  patient lying in bed without any acute distress    HEENT:  normocephalic/atraumatic  Pulmonary:  no respiratory distress    Neuro Exam  Mental Status:  alert, oriented x 3, follows commands, speech clear and fluent, naming and repetition normal  Cranial Nerves:  visual fields intact (tested by nurse), EOMI with normal smooth pursuit, facial sensation intact and symmetric (tested by nurse), facial movements symmetric, hearing not formally tested but intact to conversation, no dysarthria, shoulder shrug equal bilaterally, tongue protrusion midline  Motor:  no abnormal movements, able to move all limbs antigravity spontaneously with no signs of hemiparesis observed, no pronator drift   Reflexes:  unable to test (telestroke)  Sensory:  light touch sensation intact and symmetric throughout upper and lower extremities (assessed by nurse), no extinction on double simultaneous stimulation (assessed by nurse)  Coordination:  normal finger-to-nose and heel-to-shin bilaterally without dysmetria, rapid alternating movements symmetric  Station/Gait:  unable to test due to telestroke    Stroke Scales    NIHSS  1a. Level of Consciousness 0-->Alert, keenly responsive   1b. LOC Questions 0-->Answers both questions correctly   1c. LOC Commands 0-->Performs both tasks correctly   2.   Best Gaze 0-->Normal   3.    Visual 0-->No visual loss   4.   Facial Palsy 0-->Normal symmetrical movements   5a. Motor Arm, Left 0-->No drift, limb holds 90 (or 45) degrees for full 10 secs   5b. Motor Arm, Right 0-->No drift, limb holds 90 (or 45) degrees for full 10 secs   6a. Motor Leg, Left 0-->No drift, leg holds 30 degree position for full 5 secs   6b. Motor Leg, right 0-->No drift, leg holds 30 degree position for full 5 secs   7.   Limb Ataxia 0-->Absent   8.   Sensory 0-->Normal, no sensory loss   9.   Best Language 0-->No aphasia, normal   10. Dysarthria 0-->Normal   11. Extinction and Inattention  0-->No abnormality   Total 0 (08/08/23 1238)       Imaging  I personally reviewed all imaging; relevant findings per HPI.    Labs Data   CBC  Recent Labs   Lab 08/08/23  0536 08/07/23  1147   WBC 6.6 9.6   RBC 4.07 4.52   HGB 12.9 14.6   HCT 38.1 42.2    340     Basic Metabolic Panel   Recent Labs   Lab 08/08/23  0604 08/08/23  0536 08/08/23  0149 08/07/23  2143 08/07/23  1147   NA  --  141  --   --  142   POTASSIUM  --  3.5  --   --  4.2   CHLORIDE  --  103  --   --  101   CO2  --  27  --   --  30*   BUN  --  14.5  --   --  11.6   CR  --  0.68  --   --  0.65   * 112* 120*   < > 153*   REYNA  --  9.0  --   --  9.7    < > = values in this interval not displayed.     Liver Panel  No results for input(s): PROTTOTAL, ALBUMIN, BILITOTAL, ALKPHOS, AST, ALT, BILIDIRECT in the last 168 hours.  INR  No lab results found.        Stroke Consult Data Data   Telestroke Service Details  (for non-emergent stroke consult with tele)  Video start time 08/08/23   1237   Video end time 08/08/23   1257   Type of service telemedicine diagnostic assessment of acute neurological changes   Reason telemedicine is appropriate patient requires assessment with a specialist for diagnosis and treatment of neurological symptoms   Mode of transmission secure interactive audio and video communication per Nelida   Originating site (patient location) [facility]    Distant site (provider location) [facility] [place]       I have personally spent a total of 50 minutes providing care today, time spent in reviewing medical records and reviewing tests, examining the patient and obtaining history, coordination of care, and discussion with the patient and/or family regarding diagnostic results, prognosis, symptom management, risks and benefits of management options, and development of plan of care. Greater than 50% was spent in counseling and coordination of care.

## 2023-08-08 NOTE — PLAN OF CARE
PRIMARY DIAGNOSIS: TIA  OUTPATIENT/OBSERVATION GOALS TO BE MET BEFORE DISCHARGE:  1. Orthostatic performed: No    2. Diagnostic testing complete & at baseline neurologic testing: Yes    3. Cleared by consultants (if involved): No    4. Interpretation of cardiac rhythm per telemetry tech: Sinus Stuart 56    5. Tolerating adequate PO diet and medications: Yes    6. Return to near baseline physical activity or neurologic status: Yes    Discharge Planner Nurse   Safe discharge environment identified: No  Barriers to discharge: Yes       Entered by: Karina Zhao RN 08/08/2023 5:59 AM     Please review provider order for any additional goals.   Nurse to notify provider when observation goals have been met and patient is ready for discharge.    Pt A&Ox4. Denies any pain or discomfort. Ambulates independently to the bathroom. No stroke like symptom noted. Plan Echo and  PT, OT, SLP and Neuro consult.

## 2023-08-08 NOTE — DISCHARGE SUMMARY
"[facility]  Hospitalist Discharge Summary      Date of Admission:  8/7/2023  Date of Discharge:  8/8/2023  Discharging Provider: Lilia Lieberman PA-C  Discharge Service: Hospitalist Service    Discharge Diagnoses   TIA    Clinically Significant Risk Factors     # Overweight: Estimated body mass index is 25.26 kg/m  as calculated from the following:    Height as of this encounter: 1.549 m (5' 1\").    Weight as of this encounter: 60.6 kg (133 lb 11.2 oz).       Follow-ups Needed After Discharge   Follow-up Appointments     Follow-up and recommended labs and tests       Follow up with primary care provider, Leatha Conroy,   within 7 days for hospital follow- up.  No follow up labs or test are   needed. Review heart monitor after completed            Unresulted Labs Ordered in the Past 30 Days of this Admission       No orders found for last 31 day(s).            Discharge Disposition   Discharged to home  Condition at discharge: Stable    Hospital Course   Vicky Kang is a 88 year old female with past medical history significant for hypertension and prediabetes who presented to [facility] on 8/7/2023 with expressive aphasia and is suspected to have had a transient ischemic attack.     \"Patient stated that she was going to lunch with a friend on 8/6/2023 when she started \"having difficult getting words out.\" She stated when they sat at lunch she was able to read the menu and understand what people were saying to her, but she was not able speak correctly. Specifically, when ordering her lunch, she attempted to say she wanted a taco but the word was too garbled to understand. She had to point to the menu item. This apparently resolved near the end of the meal and patient was able to speak correctly again. She did not have any additional neurologic symptoms at the time. No headache, dizziness, vision changes, receptive aphasia, loss of sensory or motor " "function, change in bowel or bladder function. She did not have chest pain, shortness of breath, nausea, vomiting\"     In the emergency department, patient was found to be afebrile. Heart rate was 60-70s. Blood pressure 154-173/70s. She had normal oxygen saturations on room air. Laboratory studies were notable for bicarbonate 30, glucose 153. MRI/MRA without evidence of stroke, stenosis or occlusion. She did have mild, chronic small vessel changes.    Stroke neurology was called recommending telemetry monitoring which did not show arrhythmia and echocardiogram which did not show evidence of structural heart disease.  They suspect that her symptoms were a transischemic attack with recommendation for baby aspirin daily, continuation of her cholesterol medicine and follow-up with general neurology in 6 to 8 weeks.      Consultations This Hospital Stay   CARE MANAGEMENT / SOCIAL WORK IP CONSULT  NEUROLOGY IP STROKE CONSULT  PHYSICAL THERAPY ADULT IP CONSULT  OCCUPATIONAL THERAPY ADULT IP CONSULT  SPEECH LANGUAGE PATH ADULT IP CONSULT  PATIENT LEARNING CENTER IP CONSULT    Code Status   Full Code    Time Spent on this Encounter   I, Lilia Lieberman PA-C, personally saw the patient today and spent less than or equal to 30 minutes discharging this patient.       Lilia Lieberman PA-C  [facility] OBSERVATION DEPT  [address] [facility] [address]  Phone: [phone]  ______________________________________________________________________    Physical Exam   Vital Signs: Temp: 97.6  F (36.4  C) Temp src: Oral BP: (!) 144/56 Pulse: 63   Resp: 18 SpO2: 94 % O2 Device: None (Room air)    Weight: 133 lbs 11.2 oz  General Appearance: Alert and oriented x 3  Respiratory: Clear to auscultation bilaterally  Cardiovascular: RRR without murmur  GI: Bowel sounds are present without tenderness  Skin: No rashes or open sores are noted         Primary Care Physician   Leatha Bauer " Rafiq    Discharge Orders      Adult Neurology [facility] Referral      Reason for your hospital stay    You were admitted for concerns for difficulty expressing yourself for approximately 30 to 40 minutes.  Fortunately your stroke work-up was negative however we do believe this was a transischemic attack and stroke neurology is recommending a baby aspirin daily and a 30-day heart monitor to look for atrial fibrillation.     Follow-up and recommended labs and tests     Follow up with primary care provider, Leatha Conroy, within 7 days for hospital follow- up.  No follow up labs or test are needed. Review heart monitor after completed     Activity    Your activity upon discharge: activity as tolerated     Diet    Follow this diet upon discharge: Regular       Significant Results and Procedures   Most Recent 3 CBC's:  Recent Labs   Lab Test 08/08/23  0536 08/07/23  1147 07/21/23  0902   WBC 6.6 9.6 8.7   HGB 12.9 14.6 14.2   MCV 94 93 93    340 335     Most Recent 3 BMP's:  Recent Labs   Lab Test 08/08/23  0604 08/08/23  0536 08/08/23  0149 08/07/23  2143 08/07/23  1147 07/21/23  0902   NA  --  141  --   --  142 137   POTASSIUM  --  3.5  --   --  4.2 3.1*   CHLORIDE  --  103  --   --  101 96*   CO2  --  27  --   --  30* 25   BUN  --  14.5  --   --  11.6 12.2   CR  --  0.68  --   --  0.65 0.63   ANIONGAP  --  11  --   --  11 16*   REYNA  --  9.0  --   --  9.7 9.5   * 112* 120*   < > 153* 132*    < > = values in this interval not displayed.   ,   Results for orders placed or performed during the hospital encounter of 08/07/23   MRA Brain ([place]) wo Contrast    Narrative    MRI OF THE BRAIN WITHOUT AND WITH CONTRAST  MRA OF THE HEAD WITHOUT CONTRAST  MRA OF THE NECK WITHOUT AND WITH CONTRAST    8/7/2023 2:59 PM     HISTORY:  Acute neuro deficit, stroke suspected     COMPARISON: Head CT 10/26/2013.    TECHNIQUE:   BRAIN: Without and with IV contrast.     MRA: 3-D  time-of-flight MR angiography of the major arteries at the  base of the brain was performed without contrast.  2D time-of-flight  MRA without contrast with superior and inferior saturation bands and  3D T1-weighted postgadolinium MRA of the neck/cervical vessels were  also obtained. MIP reconstruction of all MR angiographic data was  performed.    CONTRAST: 10mL GADAVIST IV.    FINDINGS:   HEAD MRI:  INTRACRANIAL CONTENTS: No acute or subacute infarct. No mass, acute  hemorrhage, or extra-axial fluid collections. Mild presumed chronic  small vessel ischemic change. Mild/moderate generalized volume loss.  Normal position of the cerebellar tonsils. No pathologic enhancement.    SELLA: No significant abnormality accounting for technique.    OSSEOUS STRUCTURES/SOFT TISSUES: No aggressive osseous lesion  involving the calvarium, skull base, or visualized upper cervical  spine. The major intracranial vascular flow voids are maintained.    ORBITS: Bilateral cataract resections.    SINUSES/MASTOIDS: No significant paranasal sinus mucosal disease. No  significant middle ear or mastoid effusion.    HEAD MRA:  ANTERIOR CIRCULATION: No significant stenosis or occlusion. Standard  [place] anatomy.    POSTERIOR CIRCULATION: No significant stenosis or occlusion. Normal  vertebral arteries. The left is dominant. Normal basilar artery and  posterior cerebral arteries.    ANEURYSM/VASCULAR MALFORMATION: None.    NECK MRA:  RIGHT CAROTID: No measurable stenosis in the right ICA based on NASCET  criteria.    LEFT CAROTID: No measurable stenosis in the left ICA based on NASCET  criteria.     VERTEBRAL ARTERIES: The vertebral arteries are normal. The left is  dominant.     AORTIC ARCH: Classic aortic arch anatomy with no significant stenosis  at the origin of the great vessels.       Impression    IMPRESSION:  HEAD MRI:  1.  No mass hemorrhage or stroke.  2.  Mild presumed chronic small vessel ischemic change with mild  to  moderate generalized volume loss.    HEAD MRA:  1.  Normal head MRA.  2.  No significant stenosis.  No aneurysm or vascular malformation.    NECK MRA:  1.  Normal neck MRA for age.  2.  No significant stenosis as per NASCET criteria.         KARLA ANN MD         SYSTEM ID:  [id number]   MRA Neck (Carotids) wo & w Contrast    Narrative    MRI OF THE BRAIN WITHOUT AND WITH CONTRAST  MRA OF THE HEAD WITHOUT CONTRAST  MRA OF THE NECK WITHOUT AND WITH CONTRAST    8/7/2023 2:59 PM     HISTORY:  Acute neuro deficit, stroke suspected     COMPARISON: Head CT 10/26/2013.    TECHNIQUE:   BRAIN: Without and with IV contrast.     MRA: 3-D time-of-flight MR angiography of the major arteries at the  base of the brain was performed without contrast.  2D time-of-flight  MRA without contrast with superior and inferior saturation bands and  3D T1-weighted postgadolinium MRA of the neck/cervical vessels were  also obtained. MIP reconstruction of all MR angiographic data was  performed.    CONTRAST: 10mL GADAVIST IV.    FINDINGS:   HEAD MRI:  INTRACRANIAL CONTENTS: No acute or subacute infarct. No mass, acute  hemorrhage, or extra-axial fluid collections. Mild presumed chronic  small vessel ischemic change. Mild/moderate generalized volume loss.  Normal position of the cerebellar tonsils. No pathologic enhancement.    SELLA: No significant abnormality accounting for technique.    OSSEOUS STRUCTURES/SOFT TISSUES: No aggressive osseous lesion  involving the calvarium, skull base, or visualized upper cervical  spine. The major intracranial vascular flow voids are maintained.    ORBITS: Bilateral cataract resections.    SINUSES/MASTOIDS: No significant paranasal sinus mucosal disease. No  significant middle ear or mastoid effusion.    HEAD MRA:  ANTERIOR CIRCULATION: No significant stenosis or occlusion. Standard  [place] of [place] anatomy.    POSTERIOR CIRCULATION: No significant stenosis or occlusion. Normal  vertebral arteries.  The left is dominant. Normal basilar artery and  posterior cerebral arteries.    ANEURYSM/VASCULAR MALFORMATION: None.    NECK MRA:  RIGHT CAROTID: No measurable stenosis in the right ICA based on NASCET  criteria.    LEFT CAROTID: No measurable stenosis in the left ICA based on NASCET  criteria.     VERTEBRAL ARTERIES: The vertebral arteries are normal. The left is  dominant.     AORTIC ARCH: Classic aortic arch anatomy with no significant stenosis  at the origin of the great vessels.       Impression    IMPRESSION:  HEAD MRI:  1.  No mass hemorrhage or stroke.  2.  Mild presumed chronic small vessel ischemic change with mild to  moderate generalized volume loss.    HEAD MRA:  1.  Normal head MRA.  2.  No significant stenosis.  No aneurysm or vascular malformation.    NECK MRA:  1.  Normal neck MRA for age.  2.  No significant stenosis as per NASCET criteria.         KARLA ANN MD         SYSTEM ID:  [id number]   MR Brain w/o & w Contrast    Narrative    MRI OF THE BRAIN WITHOUT AND WITH CONTRAST  MRA OF THE HEAD WITHOUT CONTRAST  MRA OF THE NECK WITHOUT AND WITH CONTRAST    8/7/2023 2:59 PM     HISTORY:  Acute neuro deficit, stroke suspected     COMPARISON: Head CT 10/26/2013.    TECHNIQUE:   BRAIN: Without and with IV contrast.     MRA: 3-D time-of-flight MR angiography of the major arteries at the  base of the brain was performed without contrast.  2D time-of-flight  MRA without contrast with superior and inferior saturation bands and  3D T1-weighted postgadolinium MRA of the neck/cervical vessels were  also obtained. MIP reconstruction of all MR angiographic data was  performed.    CONTRAST: 10mL GADAVIST IV.    FINDINGS:   HEAD MRI:  INTRACRANIAL CONTENTS: No acute or subacute infarct. No mass, acute  hemorrhage, or extra-axial fluid collections. Mild presumed chronic  small vessel ischemic change. Mild/moderate generalized volume loss.  Normal position of the cerebellar tonsils. No pathologic  enhancement.    SELLA: No significant abnormality accounting for technique.    OSSEOUS STRUCTURES/SOFT TISSUES: No aggressive osseous lesion  involving the calvarium, skull base, or visualized upper cervical  spine. The major intracranial vascular flow voids are maintained.    ORBITS: Bilateral cataract resections.    SINUSES/MASTOIDS: No significant paranasal sinus mucosal disease. No  significant middle ear or mastoid effusion.    HEAD MRA:  ANTERIOR CIRCULATION: No significant stenosis or occlusion. Standard  [place] anatomy.    POSTERIOR CIRCULATION: No significant stenosis or occlusion. Normal  vertebral arteries. The left is dominant. Normal basilar artery and  posterior cerebral arteries.    ANEURYSM/VASCULAR MALFORMATION: None.    NECK MRA:  RIGHT CAROTID: No measurable stenosis in the right ICA based on NASCET  criteria.    LEFT CAROTID: No measurable stenosis in the left ICA based on NASCET  criteria.     VERTEBRAL ARTERIES: The vertebral arteries are normal. The left is  dominant.     AORTIC ARCH: Classic aortic arch anatomy with no significant stenosis  at the origin of the great vessels.       Impression    IMPRESSION:  HEAD MRI:  1.  No mass hemorrhage or stroke.  2.  Mild presumed chronic small vessel ischemic change with mild to  moderate generalized volume loss.    HEAD MRA:  1.  Normal head MRA.  2.  No significant stenosis.  No aneurysm or vascular malformation.    NECK MRA:  1.  Normal neck MRA for age.  2.  No significant stenosis as per NASCET criteria.         KARLA ANN MD         SYSTEM ID:  [id number]   Echocardiogram Complete    Narrative    [id number]  [id number]  [id number]  [id number]^GUTIERREZ^[facility]  Echocardiography Laboratory  [address] [place] [address]     Name: VALDEZ DALY  MRN: [id number]  : 1935  Study Date: 2023 08:50 AM  Age: 88 yrs  Gender: Female  Patient Location: [facility]  Reason For Study: Syncope  Ordering  Physician: JORDAN GUTIERREZ  Referring Physician: Leatha Serrato  Performed By: Allyson Reyes     BSA: 1.6 m2  Height: 61 in  Weight: 133 lb  HR: 61  BP: 161/78 mmHg  ______________________________________________________________________________  Procedure  Complete Portable Echo Adult.  ______________________________________________________________________________  Interpretation Summary     No cardiac cause of syncope.  ______________________________________________________________________________  Left Ventricle  The left ventricle is normal in structure, function and size.     Right Ventricle  The right ventricle is normal in structure, function and size.     Atria  Normal left atrial size. Right atrial size is normal.     Mitral Valve  The mitral valve leaflets are mildly thickened. There is mild mitral annular  calcification.     Tricuspid Valve  Normal tricuspid valve. There is trace tricuspid regurgitation.     Aortic Valve  The aortic valve is normal in structure and function.     Pulmonic Valve  Normal pulmonic valve.     Vessels  The aortic root is normal size. Normal size ascending aorta.     Pericardium  There is no pericardial effusion.     Rhythm  Sinus rhythm was noted.  ______________________________________________________________________________  MMode/2D Measurements & Calculations  IVSd: 0.77 cm     LVIDd: 4.4 cm  LVIDs: 2.1 cm  LVPWd: 0.70 cm  FS: 52.2 %  LV mass(C)d: 98.1 grams  LV mass(C)dI: 61.7 grams/m2  Ao root diam: 2.9 cm  asc Aorta Diam: 3.2 cm  LVOT diam: 1.7 cm  LVOT area: 2.3 cm2  LA Volume (BP): 43.9 ml  LA Volume Index (BP): 27.6 ml/m2  RV Base: 3.3 cm  RWT: 0.32     TAPSE: 2.2 cm     Doppler Measurements & Calculations  MV E max tom: 96.3 cm/sec  MV A max tom: 152.0 cm/sec  MV E/A: 0.63  MV max P.1 mmHg  MV mean P.0 mmHg  MV V2 VTI: 43.2 cm  MVA(VTI): 1.8 cm2  MV dec slope: 350.0 cm/sec2  MV dec time: 0.28 sec  Ao V2 max: 160.0 cm/sec  Ao max PG: 10.0 mmHg  Ao V2 mean:  108.5 cm/sec  Ao mean P.0 mmHg  Ao V2 VTI: 36.0 cm  KAMALJIT(I,D): 2.1 cm2  KAMALJIT(V,D): 2.2 cm2  LV V1 max P.4 mmHg  LV V1 max: 153.0 cm/sec  LV V1 VTI: 33.8 cm  SV(LVOT): 76.7 ml  SI(LVOT): 48.3 ml/m2  PA V2 max: 66.6 cm/sec  PA max P.8 mmHg  PA acc time: 0.12 sec  TR max calvin: 238.0 cm/sec  TR max P.7 mmHg  AV Calvin Ratio (DI): 0.96  KAMALJIT Index (cm2/m2): 1.3  E/E' av.6  Lateral E/e': 10.5  Medial E/e': 12.7     RV S Calvin: 16.7 cm/sec     ______________________________________________________________________________  Report approved by: Sivakumar Hammond 2023 11:44 AM             Discharge Medications   Current Discharge Medication List        START taking these medications    Details   aspirin 81 MG EC tablet Take 1 tablet (81 mg) by mouth daily    Associated Diagnoses: TIA (transient ischemic attack)           CONTINUE these medications which have NOT CHANGED    Details   amLODIPine (NORVASC) 5 MG tablet TAKE 1 TABLET BY MOUTH EVERY DAY  Qty: 90 tablet, Refills: 4    Associated Diagnoses: Benign hypertension      losartan-hydrochlorothiazide (HYZAAR) 50-12.5 MG tablet Take 1 tablet by mouth daily  Qty: 90 tablet, Refills: 4    Associated Diagnoses: Benign hypertension      metoprolol succinate ER (TOPROL XL) 50 MG 24 hr tablet Take 1 tablet (50 mg) by mouth 2 times daily  Qty: 180 tablet, Refills: 4    Associated Diagnoses: Benign hypertension      Multiple Vitamins-Minerals (WOMENS 50+ MULTI VITAMIN/MIN PO) Take 1 tablet by mouth daily      pravastatin (PRAVACHOL) 20 MG tablet Take 1 tablet (20 mg) by mouth daily  Qty: 90 tablet, Refills: 4    Associated Diagnoses: Hyperlipidemia LDL goal <130      potassium chloride ER (K-TAB/KLOR-CON) 10 MEQ CR tablet Take 1 tablet (10 mEq) by mouth daily  Qty: 90 tablet, Refills: 3    Associated Diagnoses: Hypokalemia           Allergies   Allergies   Allergen Reactions    Seasonal Allergies

## 2023-08-08 NOTE — PLAN OF CARE
Occupational Therapy: Orders received. Chart reviewed and discussed with care team.? Occupational Therapy not indicated as pt back to baseline level of functioning, no current OT needs per IDT rounding.? Defer discharge recommendations to care team.? Will complete IP OT orders.

## 2023-08-08 NOTE — PLAN OF CARE
"PRIMARY DIAGNOSIS: TIA  OUTPATIENT/OBSERVATION GOALS TO BE MET BEFORE DISCHARGE:  1. Orthostatic performed: NA     2. Diagnostic testing complete & at baseline neurologic testing: plan for echo today     3. Cleared by consultants (if involved): No     4. Interpretation of cardiac rhythm per telemetry tech: Sinus Stuart 59     5. Tolerating adequate PO diet and medications: Yes     6. Return to near baseline physical activity or neurologic status: Yes     Discharge Planner Nurse   Safe discharge environment identified: No  Barriers to discharge: No       Entered by: ramya corbin RN  Please review provider order for any additional goals.   Nurse to notify provider when observation goals have been met and patient is ready for discharge.     /61 (BP Location: Left arm)   Pulse 59   Temp 97.9  F (36.6  C) (Oral)   Resp 18   Ht 1.549 m (5' 1\")   Wt 60.6 kg (133 lb 11.2 oz)   LMP  (LMP Unknown)   SpO2 92%   BMI 25.26 kg/m      Vitals stable. Pt alert and oriented x4. Independently moving. Denies pain, lightheadedness, or dizziness. Neuros WDL. Plan for echo and telestroke today. Will continue to provide supportive cares.            "

## 2023-08-08 NOTE — PLAN OF CARE
"PRIMARY DIAGNOSIS: TIA  OUTPATIENT/OBSERVATION GOALS TO BE MET BEFORE DISCHARGE:  1. Orthostatic performed: NA     2. Diagnostic testing complete & at baseline neurologic testing: plan for echo today     3. Cleared by consultants (if involved): No     4. Interpretation of cardiac rhythm per telemetry tech: Sinus Staurt 59     5. Tolerating adequate PO diet and medications: Yes     6. Return to near baseline physical activity or neurologic status: Yes     Discharge Planner Nurse   Safe discharge environment identified: No  Barriers to discharge: No       Entered by: ramya corbin RN  Please review provider order for any additional goals.   Nurse to notify provider when observation goals have been met and patient is ready for discharge.     BP (!) 144/56 (BP Location: Left arm)   Pulse 63   Temp 97.6  F (36.4  C) (Oral)   Resp 18   Ht 1.549 m (5' 1\")   Wt 60.6 kg (133 lb 11.2 oz)   LMP  (LMP Unknown)   SpO2 94%   BMI 25.26 kg/m         Vitals stable. Pt alert and oriented x4. Independently moving. Denies pain, lightheadedness, or dizziness. Neuros WDL. Echo complete, see results. Telestroke today a t 1400. Pt eager to discharge. Will continue to provide supportive cares.              "

## 2023-08-08 NOTE — PLAN OF CARE
"PRIMARY DIAGNOSIS: Stroke like symptom  OUTPATIENT/OBSERVATION GOALS TO BE MET BEFORE DISCHARGE:  ADLs back to baseline: Yes    Activity and level of assistance: Ambulating independently.    Pain status: Pain free.    Return to near baseline physical activity: Yes     Discharge Planner Nurse   Safe discharge environment identified: Yes  Barriers to discharge: No       Entered by: Kami Allen RN 08/07/2023 8:05 PM     BP (!) 148/72 (BP Location: Left arm)   Pulse 61   Temp 97.8  F (36.6  C) (Oral)   Resp 16   Ht 1.549 m (5' 1\")   Wt 60.6 kg (133 lb 11.2 oz)   LMP  (LMP Unknown)   SpO2 95%   BMI 25.26 kg/m      A&OX4, VSS on RA. LS clear all lobes, Apical pulse regular, Sinus padmini per tele tech. Bowel sounds active all quadrants, Void spontaneously. Pt denied any weakness or slurred speech. No stroke like symptoms been noted. Echocardiogram scheduled for tomorrow AM. Ambulate in room independently. Regular diet. PIV SL. Will continue to monitor and provide cares.   Please review provider order for any additional goals.   Nurse to notify provider when observation goals have been met and patient is ready for discharge.  "

## 2023-08-08 NOTE — PLAN OF CARE
PRIMARY DIAGNOSIS: TIA  OUTPATIENT/OBSERVATION GOALS TO BE MET BEFORE DISCHARGE:  1. Orthostatic performed: No    2. Diagnostic testing complete & at baseline neurologic testing: Yes    3. Cleared by consultants (if involved): No    4. Interpretation of cardiac rhythm per telemetry tech: SR    5. Tolerating adequate PO diet and medications: Yes    6. Return to near baseline physical activity or neurologic status: Yes    Discharge Planner Nurse   Safe discharge environment identified: No  Barriers to discharge: Yes       Entered by: Karina Zhao RN 08/08/2023 2:41 AM     Please review provider order for any additional goals.   Nurse to notify provider when observation goals have been met and patient is ready for discharge.    Pt A&Ox4. Denies any pain or discomfort. Ambulates independently to the bathroom. No stroke like symptom noted.

## 2023-08-08 NOTE — PLAN OF CARE
Speech Language Pathology: Orders received. Chart reviewed and discussed with care team.? Speech Language Pathology not indicated due to pt passed RN swallow screen. Pt presented with word-finding difficulties which have fully resolved. Pt participated in conversation with SLP without speech/language deficits and denies swallow difficulty.? Defer discharge recommendations to PT/OT/MD.? Will complete orders.

## 2023-08-08 NOTE — PLAN OF CARE
Patient's After Visit Summary was reviewed with patient and daugher   Patient verbalized understanding of After Visit Summary, recommended follow up and was given an opportunity to ask questions.   Discharge medications sent home with patient/family: GUNNAR  Discharged with daughter    OBSERVATION patient END time: 1511

## 2023-08-09 ENCOUNTER — TELEPHONE (OUTPATIENT)
Dept: INTERNAL MEDICINE | Facility: CLINIC | Age: 88
End: 2023-08-09
Payer: COMMERCIAL

## 2023-08-09 NOTE — TELEPHONE ENCOUNTER
Patient was discharged from ER on 08/08/2023 for TIA. Patient was discharged with potassium supplement and advised to ask primary care provider how to take these. Patient states she's never been on potassium supplements in the past. Patient potassium levels were within normal reference range during hospital. Patient wondering if she should take this or not.        Please advise.    Thank you,  Arthur Ansari, Triage RN [place]  9:44 AM 8/9/2023

## 2023-08-09 NOTE — TELEPHONE ENCOUNTER
"IP F/U    Date: 08/08/2023  Diagnosis: TIA  Is patient active in care coordination? No  Was patient in TCU? No    ED / Discharge Outreach Protocol    Patient Contact    Attempt # 1    Was call answered?  Yes.  \"May I please speak with <patient name>\"  Is patient available?   Yes    ED/Discharge Protocol    \"Hi, my name is Kasie FLORIDA Ansari RN, a registered nurse, and I am calling on behalf of Dr. Hood's office at [place].  I am calling to follow up and see how things are going for you after your recent visit.\"    How are you doing now that you are home?\" Patient states she is doing okay.     Is patient experiencing symptoms that may require a hospital visit?  No.    Discharge Instructions    \"Let's review your discharge instructions.  What is/are the follow-up recommendations?  Pt. Response: Follow up with primary care provider     \"Were you instructed to make a follow-up appointment?\"  Pt. Response: Yes.  Has appointment been made?   No.  \"Can I help you schedule that appointment?\" No, patient wants to wait until her heart monitor is done to follow up so patient can follow up with primary care provider regarding heart monitor results.       \"When you see the provider, I would recommend that you bring your discharge instructions with you.    Medications    \"How many new medications are you on since your hospitalization/ED visit?\"    0-1  \"How many of your current medicines changed (dose, timing, name, etc.) while you were in the hospital/ED visit?\"   0-1  \"Do you have questions about your medications?\"    Yes, patient was discharged with potassium supplement and advised to ask primary care provider how to take these. Patient states she's never been on potassium supplements in the past. Patient potassium levels were within normal reference range during hospital. Will relay message to provider to advise on if patient should take potassium supplement.   \"Were you newly diagnosed with heart failure, COPD, diabetes or " "did you have a heart attack?\"   No  For patients on insulin: \"Did you start on insulin in the hospital or did you have your insulin dose changed?\"   No  Post Discharge Medication Reconciliation Status: discharge medications reconciled and changed, per note/orders.    Was MTM referral placed (*Make sure to put transitions as reason for referral)?   No    Call Summary      \"If you have questions or things don't continue to improve, we encourage you contact us through the main clinic number,  [phone].  Even if the clinic is not open, triage nurses are available 24/7 to help you.     We would like you to know that our clinic has extended hours (provide information).  We also have urgent care (provide details on closest location and hours/contact info)\"      \"Thank you for your time and take care!\"     "

## 2023-08-14 ENCOUNTER — OFFICE VISIT (OUTPATIENT)
Dept: INTERNAL MEDICINE | Facility: CLINIC | Age: 88
End: 2023-08-14
Payer: COMMERCIAL

## 2023-08-14 VITALS
SYSTOLIC BLOOD PRESSURE: 150 MMHG | WEIGHT: 133.7 LBS | HEIGHT: 61 IN | TEMPERATURE: 97 F | RESPIRATION RATE: 18 BRPM | DIASTOLIC BLOOD PRESSURE: 60 MMHG | OXYGEN SATURATION: 98 % | HEART RATE: 76 BPM | BODY MASS INDEX: 25.24 KG/M2

## 2023-08-14 DIAGNOSIS — I10 HTN, GOAL BELOW 150/90: ICD-10-CM

## 2023-08-14 DIAGNOSIS — Z09 HOSPITAL DISCHARGE FOLLOW-UP: Primary | ICD-10-CM

## 2023-08-14 DIAGNOSIS — G45.9 TIA (TRANSIENT ISCHEMIC ATTACK): ICD-10-CM

## 2023-08-14 DIAGNOSIS — E87.6 HYPOKALEMIA: ICD-10-CM

## 2023-08-14 PROCEDURE — 99495 TRANSJ CARE MGMT MOD F2F 14D: CPT | Performed by: INTERNAL MEDICINE

## 2023-08-14 RX ORDER — LOSARTAN POTASSIUM 50 MG/1
50 TABLET ORAL 2 TIMES DAILY
Qty: 180 TABLET | Refills: 1 | Status: SHIPPED | OUTPATIENT
Start: 2023-08-14 | End: 2024-02-08

## 2023-08-14 ASSESSMENT — PAIN SCALES - GENERAL: PAINLEVEL: NO PAIN (0)

## 2023-08-14 NOTE — PATIENT INSTRUCTIONS
Plan:  Follow up with neurology  2. Stop Losartan/ hydrochlorothiazide, stop potassium pill  3. Start Losartan 50 mg twice a day -- if the blood pressure is lower than 120, skip 1 dose of Losartan  4. Follow up in a month for the blood pressure ( SD or 7:30 ok)

## 2023-08-14 NOTE — PROGRESS NOTES
"  Patient's instructions / PLAN:                                                        Plan:  Follow up with neurology  2. Stop Losartan/ hydrochlorothiazide, stop potassium pill  3. Start Losartan 50 mg twice a day -- if the blood pressure is lower than 120, skip 1 dose of Losartan  4. Follow up in a month for the blood pressure        ASSESSMENT & PLAN:                                                      (Z09) Hospital discharge follow-up  (primary encounter diagnosis)  (G45.9) TIA (transient ischemic attack)  Comment: feels well   Plan: heart monitor, neuro     (I10) HTN, goal below 150/90  Comment: Not controlled   Plan: losartan (COZAAR) 50 MG tablet    (E87.6) Hypokalemia  Comment: sec hydrochlorothiazide   Plan: as above        Chief Complaint:                                                        Hosp f/u    SUBJECTIVE:                                                    History of present illness     Hosp f/unit(s)   -- transitory expressive aphasia  -- neg MRI/MRA  -- feels back to normal  -- she needs f/up appointment [facility] in Jan, 2024     Hypokalemia  -- I prescribed K, but she is interested in food w high K. List will be printed     HTN  -- home BP: 164/65, 149/63, 151/65, 131/62, 116/59  -- she brought the machine: 154/72, 61   Our machine 144/68  -- Meds: amlodipine 5, Losartan/hydrochlorothiazide 50/12.5, Metoprolol 50 bid      ROS:                                                      ROS: negative for fever, chills, cough, wheezes, chest pain, shortness of breath, vomiting, abdominal pain, leg swelling     OBJECTIVE:                                                    Physical Exam :    Blood pressure (!) 150/60, pulse 76, temperature 97  F (36.1  C), temperature source Tympanic, resp. rate 18, height 1.549 m (5' 1\"), weight 60.6 kg (133 lb 11.2 oz), SpO2 98 %, not currently breastfeeding.   NAD, appears comfortable  Skin: no rashes   Neck: supple, no JVD, No thyroidmegaly. Lymph " nodes nonpalpable cervical and supraclavicular.  Chest: clear to auscultation bilaterally, good respiratory effort  Heart: S1 S2, RRR, no mgr appreciated  Abdomen: soft, not tender, no hepatosplenomegaly or masses appreciated, no abdominal bruit, present bowel sounds  Extremities: no edema,   Neurologic: A, Ox3, no focal signs appreciated    PMHx: reviewed  Past Medical History:   Diagnosis Date    Abnormal finding on thyroid function test 11/10/2015    Hyperlipidemia     Hypertension     Neuropathy     Subclinical hypothyroidism 11/10/2015    Thyroid nodule 11/10/2015      PSHx: reviewed  Past Surgical History:   Procedure Laterality Date    CATARACT IOL, RT/LT      ENT SURGERY   Spring 2017    throat cyst removal    PHACOEMULSIFICATION CLEAR CORNEA WITH STANDARD INTRAOCULAR LENS IMPLANT  1/5/2012    Procedure:PHACOEMULSIFICATION CLEAR CORNEA WITH STANDARD INTRAOCULAR LENS IMPLANT; LEFT PHACOEMULSIFICATION CLEAR CORNEA WITH STANDARD LENS IMPLANT ; Surgeon:TUNDE GARDNER; Location: [facility]    VITRECTOMY ANTERIOR          Meds: reviewed  Current Outpatient Medications   Medication Sig Dispense Refill    amLODIPine (NORVASC) 5 MG tablet TAKE 1 TABLET BY MOUTH EVERY DAY 90 tablet 4    aspirin 81 MG EC tablet Take 1 tablet (81 mg) by mouth daily      losartan-hydrochlorothiazide (HYZAAR) 50-12.5 MG tablet Take 1 tablet by mouth daily 90 tablet 4    metoprolol succinate ER (TOPROL XL) 50 MG 24 hr tablet Take 1 tablet (50 mg) by mouth 2 times daily 180 tablet 4    Multiple Vitamins-Minerals (WOMENS 50+ MULTI VITAMIN/MIN PO) Take 1 tablet by mouth daily      pravastatin (PRAVACHOL) 20 MG tablet Take 1 tablet (20 mg) by mouth daily 90 tablet 4       Soc Hx: reviewed  Fam Hx: reviewed      Chart documentation was completed, in part, with [employer] voice-recognition software. Even though reviewed, some grammatical, spelling, and word errors may remain.      Leatha Hood MD  Internal Medicine           Subjective   Vicky starkey  a 88 year old, presenting for the following health issues:  Pt. Is being seen for an hospital follow up.      8/14/2023     8:40 AM   Additional Questions   Roomed by Anisa Leblanc       [facility]       Hospital Follow-up Visit:    Hospital/Nursing Home/IP Rehab Facility: [facility]      Date of Admission: 08/07/2023  Date of Discharge: 08/08/2023  Reason(s) for Admission: Hospital Problem List      TIA (transient ischemic attack)    Was your hospitalization related to COVID-19? No   Problems taking medications regularly:  None  Medication changes since discharge: None  Problems adhering to non-medication therapy:  None    Summary of hospitalization:  [facility] discharge summary reviewed  Diagnostic Tests/Treatments reviewed.  Follow up needed: as above   Other Healthcare Providers Involved in Patient s Care:          neuro  Update since discharge: improved.         Plan of care communicated with patient

## 2023-09-13 ENCOUNTER — OFFICE VISIT (OUTPATIENT)
Dept: INTERNAL MEDICINE | Facility: CLINIC | Age: 88
End: 2023-09-13
Payer: COMMERCIAL

## 2023-09-13 VITALS
TEMPERATURE: 97.1 F | SYSTOLIC BLOOD PRESSURE: 140 MMHG | DIASTOLIC BLOOD PRESSURE: 60 MMHG | WEIGHT: 137.2 LBS | HEART RATE: 65 BPM | HEIGHT: 61 IN | OXYGEN SATURATION: 97 % | BODY MASS INDEX: 25.9 KG/M2 | RESPIRATION RATE: 18 BRPM

## 2023-09-13 DIAGNOSIS — I10 HTN, GOAL BELOW 140/90: Primary | ICD-10-CM

## 2023-09-13 DIAGNOSIS — E87.6 HYPOKALEMIA: ICD-10-CM

## 2023-09-13 DIAGNOSIS — R60.0 BILATERAL LEG EDEMA: ICD-10-CM

## 2023-09-13 PROCEDURE — 99214 OFFICE O/P EST MOD 30 MIN: CPT | Performed by: INTERNAL MEDICINE

## 2023-09-13 RX ORDER — HYDROCHLOROTHIAZIDE 12.5 MG/1
12.5 TABLET ORAL DAILY
Qty: 90 TABLET | Refills: 1 | Status: SHIPPED | OUTPATIENT
Start: 2023-09-13

## 2023-09-13 ASSESSMENT — PAIN SCALES - GENERAL: PAINLEVEL: NO PAIN (0)

## 2023-09-13 NOTE — PROGRESS NOTES
Dr Hood's note      Patient's instructions / PLAN:                                                        Plan:  Continue same meds, same doses for now   2. Add hydrochlorothiazide 12.5 mg every other day or daily -- for swollen legs  3. Food high in potassium   4. Please make a lab appointment for non fasting labs beginning of Nov to check potassium   5. Schedule ANNUAL EXAM after July 22, 2024  6. You will benefit from these vaccines  -- flu  -- RSV  -- Covid        ASSESSMENT & PLAN:                                                      (I10) HTN, goal below 140/90  (primary encounter diagnosis)  Comment: Controlled for age  Plan: Basic metabolic panel        As above    (R60.0) Bilateral leg edema  Comment:  Since stopping hydrochlorothiazide.  We will resume at a lower dose  Plan: hydrochlorothiazide (HYDRODIURIL) 12.5 MG         tablet, Basic metabolic panel            (E87.6) Hypokalemia  Comment:   Plan: Basic metabolic panel        Info printed about foods high in potassium       Chief complaint:                                                      Follow up chronic medical problems      SUBJECTIVE:                                                    History of present illness:    HTN  -- home /62 - 145/70, most of the numbers in 120-130s  -- stopping the diuretic, now leg edema worse toward the evening  -- hx of hypokalemia. She prefers to avoid K pills     TIA  -- She had heart monitor.  The results are not posted in the chart.  There are some EKG tracings in the chart, I reviewed and I see no A-fib.  They show sinus rhythm with 1 PVC.   -- She has appointment with neurologist tomorrow    Aug 14 LOV: Plan:  Follow up with neurology  2. Stop Losartan/ hydrochlorothiazide, stop potassium pill  3. Start Losartan 50 mg twice a day -- if the blood pressure is lower than 120, skip 1 dose of Losartan  4. Follow up in a month for the blood pressure    Subjective   Vicky is a 88 year old, presenting for  "the following health issues:  Patient is being seen for an follow up.      9/13/2023     9:31 AM   Additional Questions   Roomed by Anisa Leblanc       History of Present Illness       Hypertension: She presents for follow up of hypertension.  She does check blood pressure  regularly outside of the clinic. Outside blood pressures have been over 140/90. She follows a low salt diet.     She eats 4 or more servings of fruits and vegetables daily.She consumes 0 sweetened beverage(s) daily.She exercises with enough effort to increase her heart rate 30 to 60 minutes per day.  She exercises with enough effort to increase her heart rate 4 days per week.   She is taking medications regularly.       Hyperlipidemia Follow-Up    Are you regularly taking any medication or supplement to lower your cholesterol?   Yes- pravastatin  Are you having muscle aches or other side effects that you think could be caused by your cholesterol lowering medication?  No.    Review of Systems:                                                      ROS: negative for fever, chills, cough, wheezes, chest pain, shortness of breath, vomiting, abdominal pain, positive for leg swelling          OBJECTIVE:             Physical exam:  Blood pressure (!) 140/60, pulse 65, temperature 97.1  F (36.2  C), temperature source Tympanic, resp. rate 18, height 1.549 m (5' 1\"), weight 62.2 kg (137 lb 3.2 oz), SpO2 97 %, not currently breastfeeding.     NAD, appears comfortable  Skin: no rashes   Neck: supple, no JVD,  No thyroidmegaly. Lymph nodes nonpalpable cervical and supraclavicular.  Chest: clear to auscultation bilaterally, good respiratory effort  Heart: S1 S2, RRR, no mgr appreciated  Abdomen: soft, not tender,   Extremities: trace edema,   Neurologic: A, Ox3, no focal signs appreciated    PMHx: reviewed  Past Medical History:   Diagnosis Date    Abnormal finding on thyroid function test 11/10/2015    Hyperlipidemia     Hypertension     Neuropathy     " Subclinical hypothyroidism 11/10/2015    Thyroid nodule 11/10/2015      PSHx: reviewed  Past Surgical History:   Procedure Laterality Date    CATARACT IOL, RT/LT      ENT SURGERY   Spring 2017    throat cyst removal    PHACOEMULSIFICATION CLEAR CORNEA WITH STANDARD INTRAOCULAR LENS IMPLANT  1/5/2012    Procedure:PHACOEMULSIFICATION CLEAR CORNEA WITH STANDARD INTRAOCULAR LENS IMPLANT; LEFT PHACOEMULSIFICATION CLEAR CORNEA WITH STANDARD LENS IMPLANT ; Surgeon:TUNDE GARDNER; Location: [facility]    VITRECTOMY ANTERIOR          Meds: reviewed  Current Outpatient Medications   Medication Sig Dispense Refill    amLODIPine (NORVASC) 5 MG tablet TAKE 1 TABLET BY MOUTH EVERY DAY 90 tablet 4    aspirin 81 MG EC tablet Take 1 tablet (81 mg) by mouth daily      losartan (COZAAR) 50 MG tablet Take 1 tablet (50 mg) by mouth 2 times daily 180 tablet 1    metoprolol succinate ER (TOPROL XL) 50 MG 24 hr tablet Take 1 tablet (50 mg) by mouth 2 times daily 180 tablet 4    Multiple Vitamins-Minerals (WOMENS 50+ MULTI VITAMIN/MIN PO) Take 1 tablet by mouth daily      pravastatin (PRAVACHOL) 20 MG tablet Take 1 tablet (20 mg) by mouth daily 90 tablet 4       Soc Hx: reviewed  Fam Hx: reviewed      Chart documentation was completed, in part, with [employer] voice-recognition software. Even though reviewed, some grammatical, spelling, and word errors may remain.      Leatha Hood MD  Internal Medicine

## 2023-09-13 NOTE — PATIENT INSTRUCTIONS
Plan:  Continue same meds, same doses for now   2. Add hydrochlorothiazide 12.5 mg every other day or daily -- for swollen legs  3. Food high in potassium   4. Please make a lab appointment for non fasting labs beginning of Nov to check potassium   5. Schedule ANNUAL EXAM after July 22, 2024  6. You will benefit from these vaccines  -- flu  -- RSV  -- Covid

## 2023-10-03 ENCOUNTER — HOSPITAL ENCOUNTER (OUTPATIENT)
Dept: ULTRASOUND IMAGING | Facility: CLINIC | Age: 88
Discharge: HOME | End: 2023-10-03
Attending: INTERNAL MEDICINE | Admitting: INTERNAL MEDICINE
Payer: COMMERCIAL

## 2023-10-03 DIAGNOSIS — E04.1 THYROID NODULE: ICD-10-CM

## 2023-10-03 PROCEDURE — 76536 US EXAM OF HEAD AND NECK: CPT

## 2023-10-05 ENCOUNTER — TRANSFERRED RECORDS (OUTPATIENT)
Dept: HEALTH INFORMATION MANAGEMENT | Facility: CLINIC | Age: 88
End: 2023-10-05
Payer: COMMERCIAL

## 2023-10-08 ENCOUNTER — TELEPHONE (OUTPATIENT)
Dept: INTERNAL MEDICINE | Facility: CLINIC | Age: 88
End: 2023-10-08
Payer: COMMERCIAL

## 2023-10-08 NOTE — TELEPHONE ENCOUNTER
"Please call cardiology  I do not see the report of the event monitor  Epic shows \" in process\" from August 7      "

## 2023-10-10 ENCOUNTER — TELEPHONE (OUTPATIENT)
Dept: NEUROLOGY | Facility: CLINIC | Age: 88
End: 2023-10-10
Payer: COMMERCIAL

## 2023-10-10 NOTE — TELEPHONE ENCOUNTER
[facility] Call Center    Phone Message    May a detailed message be left on voicemail: no     Reason for Call: Other: Maribeth called  from Pt's PCP office to inquire on the results on Cardiac Event Monitor from 08/07/23.    Please call Maribeth back at [phone] to discuss further.    Action Taken: Message routed to:  Other: [facility]    Travel Screening: Not Applicable

## 2023-10-10 NOTE — TELEPHONE ENCOUNTER
I tried calling [phone] twice and got disconnected both times. I also left a message at [phone] to see if they can help us.

## 2023-10-10 NOTE — TELEPHONE ENCOUNTER
I was finally able to reach someone in cardiology and they said this was not ordered by any of their doctors. I called Lisa Ramirez's office at [phone] and they are going to get back to us about the results.

## 2023-10-11 NOTE — TELEPHONE ENCOUNTER
Summary report appears to have been faxed to PCP per note on TE from 10/8 and is being sent to HIMs for scanning. No further action required on this encounter.      Cinda Trinidad BS, RN, SCRN  RN Stroke Neurology Care Coordinator  [facility] Neuroscience Service Line

## 2023-10-16 NOTE — TELEPHONE ENCOUNTER
"  I am able to see the recorded strip but there is no interpretation \" result is not signed\"      "

## 2023-10-18 NOTE — TELEPHONE ENCOUNTER
Patient is calling for results from EKG monitor ordered August 7th   She mailed it about September 8th or 9th     Epic still states in process.   Reviewed 10/8 telephone call - provider states no cardiology note and only one EKG strip is under the 10/5 records transfer.     Advised patient appears that provider is working on obtaining results -  provider routed message that results are not back to [place] wild today 10/18/23

## 2023-10-19 ENCOUNTER — TELEPHONE (OUTPATIENT)
Dept: INTERNAL MEDICINE | Facility: CLINIC | Age: 88
End: 2023-10-19
Payer: COMMERCIAL

## 2023-10-19 NOTE — TELEPHONE ENCOUNTER
Routing to EP team, can you please clarify if there is further action needed on this? I see a TE from PCP team today that they're calling the pt to discuss monitor results. Has the report been reviewed and signed by the cardiologist and we're just waiting on it to be scanned?      Cinda Trinidad BS, RN, SCRN  RN Stroke Neurology Care Coordinator  [facility]science Service Line

## 2023-10-19 NOTE — TELEPHONE ENCOUNTER
I am unable to see any result for the cardiac monitor. This would need to be read by cardiology before I can review or see any result.

## 2023-10-22 NOTE — TELEPHONE ENCOUNTER
Giuliana,     Please explain.    What info did you tell the patient ?    I do not see any results in this chart about the heart monitor.    Thank you,    Leatha Hood MD  Internal Medicine

## 2023-10-23 NOTE — TELEPHONE ENCOUNTER
Summary report is now scanned into pt's chart and results appear to have been communicated to pt by PCP team. SR throughout with occasional PVCs. Patient was seen by Dr. Charles at [facility] for follow up on 9/14. I have faxed them a copy of the results, although since there is no afib, pt is likely to continue on 81mg asa daily.     Fax confirmation:         Cinda Trinidad BS, RN, SCRN  RN Stroke Neurology Care Coordinator  [facility] Neuroscience Service Line

## 2023-11-20 ENCOUNTER — LAB (OUTPATIENT)
Dept: LAB | Facility: CLINIC | Age: 88
End: 2023-11-20
Payer: COMMERCIAL

## 2023-11-20 DIAGNOSIS — E87.6 HYPOKALEMIA: ICD-10-CM

## 2023-11-20 DIAGNOSIS — I10 HTN, GOAL BELOW 140/90: ICD-10-CM

## 2023-11-20 DIAGNOSIS — R60.0 BILATERAL LEG EDEMA: ICD-10-CM

## 2023-11-20 LAB
ANION GAP SERPL CALCULATED.3IONS-SCNC: 12 MMOL/L (ref 7–15)
BUN SERPL-MCNC: 12.6 MG/DL (ref 8–23)
CALCIUM SERPL-MCNC: 9.5 MG/DL (ref 8.8–10.2)
CHLORIDE SERPL-SCNC: 102 MMOL/L (ref 98–107)
CREAT SERPL-MCNC: 0.64 MG/DL (ref 0.51–0.95)
DEPRECATED HCO3 PLAS-SCNC: 29 MMOL/L (ref 22–29)
EGFRCR SERPLBLD CKD-EPI 2021: 85 ML/MIN/1.73M2
GLUCOSE SERPL-MCNC: 131 MG/DL (ref 70–99)
POTASSIUM SERPL-SCNC: 3.9 MMOL/L (ref 3.4–5.3)
SODIUM SERPL-SCNC: 143 MMOL/L (ref 135–145)

## 2023-11-20 PROCEDURE — 80048 BASIC METABOLIC PNL TOTAL CA: CPT

## 2023-11-20 PROCEDURE — 36415 COLL VENOUS BLD VENIPUNCTURE: CPT

## 2024-02-06 DIAGNOSIS — I10 HTN, GOAL BELOW 150/90: ICD-10-CM

## 2024-02-08 RX ORDER — LOSARTAN POTASSIUM 50 MG/1
50 TABLET ORAL 2 TIMES DAILY
Qty: 180 TABLET | Refills: 1 | Status: SHIPPED | OUTPATIENT
Start: 2024-02-08 | End: 2024-05-06

## 2024-04-03 ENCOUNTER — TELEPHONE (OUTPATIENT)
Dept: INTERNAL MEDICINE | Facility: CLINIC | Age: 89
End: 2024-04-03
Payer: COMMERCIAL

## 2024-04-03 NOTE — TELEPHONE ENCOUNTER
Patient reports she had UTI and was seen at [facility] urgent care and was given Macrobid, but didn't clear up so she was started on Ciprofloxacin.    Patient states she has never been on Cipro before and is worried about tendon rupture as a side effect associated with the Ciprofloxacin.     Advise patient to contact pharmacist to discuss side effects more in depth. Patient will plan to contact pharmacist, but wanted primary care provider's input as well on her side effect concern.    Thank you,  Arthur Ansari, Triage RN [place]  10:06 AM 4/3/2024

## 2024-04-19 ENCOUNTER — TRANSFERRED RECORDS (OUTPATIENT)
Dept: HEALTH INFORMATION MANAGEMENT | Facility: CLINIC | Age: 89
End: 2024-04-19
Payer: COMMERCIAL

## 2024-05-06 ENCOUNTER — OFFICE VISIT (OUTPATIENT)
Dept: INTERNAL MEDICINE | Facility: CLINIC | Age: 89
End: 2024-05-06
Payer: COMMERCIAL

## 2024-05-06 VITALS
RESPIRATION RATE: 18 BRPM | HEIGHT: 61 IN | TEMPERATURE: 97.4 F | SYSTOLIC BLOOD PRESSURE: 152 MMHG | BODY MASS INDEX: 26.19 KG/M2 | WEIGHT: 138.7 LBS | OXYGEN SATURATION: 97 % | HEART RATE: 61 BPM | DIASTOLIC BLOOD PRESSURE: 64 MMHG

## 2024-05-06 DIAGNOSIS — M79.2 RADICULAR PAIN OF LEFT UPPER EXTREMITY: ICD-10-CM

## 2024-05-06 DIAGNOSIS — R73.9 BLOOD SUGAR INCREASED: Primary | ICD-10-CM

## 2024-05-06 DIAGNOSIS — I10 HTN, GOAL BELOW 150/90: ICD-10-CM

## 2024-05-06 DIAGNOSIS — E78.5 HYPERLIPIDEMIA LDL GOAL <130: ICD-10-CM

## 2024-05-06 DIAGNOSIS — M54.2 CERVICAL SPINE PAIN: ICD-10-CM

## 2024-05-06 PROCEDURE — 99214 OFFICE O/P EST MOD 30 MIN: CPT | Performed by: INTERNAL MEDICINE

## 2024-05-06 PROCEDURE — G2211 COMPLEX E/M VISIT ADD ON: HCPCS | Performed by: INTERNAL MEDICINE

## 2024-05-06 RX ORDER — METOPROLOL SUCCINATE 50 MG/1
50 TABLET, EXTENDED RELEASE ORAL 2 TIMES DAILY
Qty: 180 TABLET | Refills: 4 | Status: SHIPPED | OUTPATIENT
Start: 2024-05-06 | End: 2024-08-20

## 2024-05-06 RX ORDER — AMLODIPINE BESYLATE 5 MG/1
TABLET ORAL
Qty: 90 TABLET | Refills: 1 | Status: SHIPPED | OUTPATIENT
Start: 2024-05-06 | End: 2024-08-20

## 2024-05-06 RX ORDER — LOSARTAN POTASSIUM 50 MG/1
50 TABLET ORAL 2 TIMES DAILY
Qty: 180 TABLET | Refills: 1 | Status: SHIPPED | OUTPATIENT
Start: 2024-05-06 | End: 2024-08-20

## 2024-05-06 RX ORDER — PRAVASTATIN SODIUM 20 MG
20 TABLET ORAL DAILY
Qty: 90 TABLET | Refills: 4 | Status: SHIPPED | OUTPATIENT
Start: 2024-05-06 | End: 2024-08-20

## 2024-05-06 ASSESSMENT — PAIN SCALES - GENERAL: PAINLEVEL: NO PAIN (0)

## 2024-05-06 NOTE — PATIENT INSTRUCTIONS
Plan:  Continue same meds, same doses for now   2. Schedule ANNUAL EXAM in Aug  3. Please make a lab appointment for fasting labs  few days before

## 2024-05-06 NOTE — PROGRESS NOTES
Dr Hood's note      Patient's instructions / PLAN:                                                        Plan:  Continue same meds, same doses for now   2. Schedule ANNUAL EXAM in Aug  3. Please make a lab appointment for fasting labs  few days before            ASSESSMENT & PLAN:                                                      (R73.09) Blood sugar increased  (primary encounter diagnosis)  Comment:   Plan: Hemoglobin A1c, Comprehensive metabolic panel,         Lipid panel reflex to direct LDL Fasting, CBC         with platelets, TSH with free T4 reflex,         Albumin Random Urine Quantitative with Creat         Ratio            (I10) HTN, goal below 150/90  Comment: Controlled based on home numbers  Plan: amLODIPine (NORVASC) 5 MG tablet, losartan         (COZAAR) 50 MG tablet, Hemoglobin A1c,         Comprehensive metabolic panel, Lipid panel         reflex to direct LDL Fasting, CBC with         platelets, TSH with free T4 reflex, Albumin         Random Urine Quantitative with Creat Ratio            (E78.5) Hyperlipidemia LDL goal <130  Comment: Controlled  Plan: pravastatin (PRAVACHOL) 20 MG tablet,         Hemoglobin A1c, Comprehensive metabolic panel,         Lipid panel reflex to direct LDL Fasting, CBC         with platelets, TSH with free T4 reflex,         Albumin Random Urine Quantitative with Creat         Ratio            (M54.2) Cervical spine pain  (M79.2) Radicular pain of left upper extremity  Comment: Stable  Plan: Follow-up with neurology         Chief complaint:                                                      Follow up chronic medical problems      SUBJECTIVE:                                                    History of present illness:    Recent UTI    HTN  -- In the office the blood pressures have been elevated 150s to 170s  -- home BP 130s/60 her BP machine is accurate  -- She takes HCTZ, losartan, metoprolol, amlodipine    Neurology note from April 19 reviewed and discussed.  Patient here for follow up. Chart reviewed. "She has questions about test results and futures tests    Subjective   Vicky is a 89 year old, presenting for the following health issues:  Patient is being seen for an follow up. Patient was seen at 8/7/2023 - 8/8/2023 (25 hours)  [facility]     Scar Garcia MD  Last attending  Treatment team TIA (transient ischemic attack)  Principal problem         5/6/2024     7:48 AM   Additional Questions   Roomed by Anisa SWANSON       Hyperlipidemia Follow-Up    Are you regularly taking any medication or supplement to lower your cholesterol?   Yes- pravastatin  Are you having muscle aches or other side effects that you think could be caused by your cholesterol lowering medication?  No    Hypertension Follow-up    Do you check your blood pressure regularly outside of the clinic? Yes   Are you following a low salt diet? Yes  Are your blood pressures ever more than 140 on the top number (systolic) OR more   than 90 on the bottom number (diastolic), for example 140/90? No  How many servings of fruits and vegetables do you eat daily?  2-3  On average, how many sweetened beverages do you drink each day (Examples: soda, juice, sweet tea, etc.  Do NOT count diet or artificially sweetened beverages)?   0  How many days per week do you exercise enough to make your heart beat faster? 3 or less  How many minutes a day do you exercise enough to make your heart beat faster? 9 or less  How many days per week do you miss taking your medication? 0      Review of Systems:                                                      ROS: negative for fever, chills, cough, wheezes, chest pain, shortness of breath, vomiting, abdominal pain, leg swelling       OBJECTIVE:             Physical exam:  Blood pressure (!) 152/64, pulse 61, temperature 97.4  F (36.3  C), temperature source Tympanic, resp. rate 18, height 1.549 m (5' 1\"), weight 62.9 kg (138 lb 11.2 oz), SpO2 97%, not currently breastfeeding.     NAD, appears " comfortable  Skin: no rashes   Neck: supple, no JVD,  No thyroidmegaly. Lymph nodes nonpalpable cervical and supraclavicular.  Chest: clear to auscultation bilaterally, good respiratory effort  Heart: S1 S2, RRR, no mgr appreciated  Abdomen: soft, not tender,   Extremities: no edema,   Neurologic: A, Ox3, no focal signs appreciated    PMHx: reviewed  Past Medical History:   Diagnosis Date    Abnormal finding on thyroid function test 11/10/2015    Hyperlipidemia     Hypertension     Neuropathy     Subclinical hypothyroidism 11/10/2015    Thyroid nodule 11/10/2015      PSHx: reviewed  Past Surgical History:   Procedure Laterality Date    CATARACT IOL, RT/LT      ENT SURGERY   Spring 2017    throat cyst removal    PHACOEMULSIFICATION CLEAR CORNEA WITH STANDARD INTRAOCULAR LENS IMPLANT  1/5/2012    Procedure:PHACOEMULSIFICATION CLEAR CORNEA WITH STANDARD INTRAOCULAR LENS IMPLANT; LEFT PHACOEMULSIFICATION CLEAR CORNEA WITH STANDARD LENS IMPLANT ; Surgeon:TUNDE GARDNER; Location: [facility]    VITRECTOMY ANTERIOR          Meds: reviewed  Current Outpatient Medications   Medication Sig Dispense Refill    amLODIPine (NORVASC) 5 MG tablet TAKE 1 TABLET BY MOUTH EVERY DAY 90 tablet 4    aspirin 81 MG EC tablet Take 1 tablet (81 mg) by mouth daily      hydrochlorothiazide (HYDRODIURIL) 12.5 MG tablet Take 1 tablet (12.5 mg) by mouth daily 90 tablet 1    losartan (COZAAR) 50 MG tablet TAKE 1 TABLET BY MOUTH TWICE A  tablet 1    metoprolol succinate ER (TOPROL XL) 50 MG 24 hr tablet Take 1 tablet (50 mg) by mouth 2 times daily 180 tablet 4    Multiple Vitamins-Minerals (WOMENS 50+ MULTI VITAMIN/MIN PO) Take 1 tablet by mouth daily      pravastatin (PRAVACHOL) 20 MG tablet Take 1 tablet (20 mg) by mouth daily 90 tablet 4       Soc Hx: reviewed  Fam Hx: reviewed      Chart documentation was completed, in part, with [employer] voice-recognition software. Even though reviewed, some grammatical, spelling, and word errors may  remain.      Leatha Hood MD  Internal Medicine       Signed Electronically by: Leatha Conroy MD

## 2024-06-13 ENCOUNTER — TRANSFERRED RECORDS (OUTPATIENT)
Dept: HEALTH INFORMATION MANAGEMENT | Facility: CLINIC | Age: 89
End: 2024-06-13
Payer: COMMERCIAL

## 2024-07-17 ENCOUNTER — THERAPY VISIT (OUTPATIENT)
Dept: PHYSICAL THERAPY | Facility: CLINIC | Age: 89
End: 2024-07-17
Payer: COMMERCIAL

## 2024-07-17 DIAGNOSIS — M54.2 CERVICAL PAIN: ICD-10-CM

## 2024-07-17 DIAGNOSIS — M79.2 RADICULAR PAIN OF LEFT UPPER EXTREMITY: Primary | ICD-10-CM

## 2024-07-17 PROCEDURE — 97140 MANUAL THERAPY 1/> REGIONS: CPT | Mod: GP | Performed by: PHYSICAL THERAPIST

## 2024-07-17 PROCEDURE — 97110 THERAPEUTIC EXERCISES: CPT | Mod: GP | Performed by: PHYSICAL THERAPIST

## 2024-07-17 PROCEDURE — 97161 PT EVAL LOW COMPLEX 20 MIN: CPT | Mod: GP | Performed by: PHYSICAL THERAPIST

## 2024-07-17 NOTE — PROGRESS NOTES
PHYSICAL THERAPY EVALUATION  Type of Visit: Evaluation       Fall Risk Screen:  Fall screen completed by: PT  Have you fallen 2 or more times in the past year?: No  Have you fallen and had an injury in the past year?: No  Is patient a fall risk?: No    Subjective   Patient reports to PT for pain on at base of neck and into the top of the left shoulder. States the symptoms started about 1 year ago. She gets the symptoms after playing the piano, working on a computer or using her phone for 15-20 minutes. Reports the pain is really sharp 7/10 and resolves quickly after stopping that activity.       Presenting condition or subjective complaint: mri suggested therapy  Date of onset: 07/17/23    Relevant medical history:     Dates & types of surgery:      Prior diagnostic imaging/testing results: MRI; EMG     MRI  Impression    1.  Multilevel degenerative changes, as detailed above.  2.  Mild to moderate spinal canal stenosis at C3-4, mild at C5-6. No additional significant spinal canal stenosis at the remaining cervical levels.  3.  Multilevel neural foraminal stenosis which is moderate at C5-6 and C6-7 on the right, mild at additional levels.  4.  Normal appearance of the cervical and upper thoracic spinal cord.    Prior therapy history for the same diagnosis, illness or injury:          Living Environment  Social support: Alone   Type of home: Apartment/condo   Stairs to enter the home: No       Ramp: No   Stairs inside the home: No       Help at home: None  Equipment owned:       Employment: No    Hobbies/Interests: playing piano  eating out   visiting interesting places    Patient goals for therapy:         Objective   CERVICAL SPINE EVALUATION  POSTURE: WNL  GAIT:   Weightbearing Status: WBAT  Assistive Device(s): None  Gait Deviations: WNL  WEIGHTBEARING ALIGNMENT:  left scapular elevated  ROM:  Cervical AROM flexion and extension WNL, Rotation right WNL and left min loss SB right mod loss and left max  loss  MYOTOMES: WNL  DERMATOMES: WNL  NEURAL TENSION: Cervical WNL  FLEXIBILITY:  dec flexibility of upper trap, levators and scalenes    SPECIAL TESTS: WFL  PALPATION:  TTP L levator scapula and upper trap   STRENGTH: Weakness in middle and lower trap L>R      Assessment & Plan   CLINICAL IMPRESSIONS  Medical Diagnosis: Cervical Pain, Radicular pain of left upper extremity    Treatment Diagnosis: Cervical Pain and left radicular symptoms   Impression/Assessment: Patient is a 89 year old female with neck and upper left shoulder complaints.  The following significant findings have been identified: Pain, Decreased ROM/flexibility, Decreased strength, Impaired muscle performance, Decreased activity tolerance, and Impaired posture. These impairments interfere with their ability to perform work tasks, recreational activities, and household chores as compared to previous level of function.     Clinical Decision Making (Complexity):  Clinical Presentation: Stable/Uncomplicated  Clinical Presentation Rationale: based on medical and personal factors listed in PT evaluation  Clinical Decision Making (Complexity): Low complexity    PLAN OF CARE  Treatment Interventions:  Interventions: Manual Therapy, Neuromuscular Re-education, Therapeutic Activity, Therapeutic Exercise, Self-Care/Home Management    Long Term Goals     PT Goal 1  Goal Identifier: Goal  Goal Description: Chintan will be able to perform a task with her hands for 20 minutes such as cleaning, computer work and playing the piano with pain at worst 2/10  Rationale: to maximize safety and independence with self cares;to maximize safety and independence with transportation;to maximize safety and independence within the community;to maximize safety and independence within the home;to maximize safety and independence with performance of ADLs and functional tasks  Target Date: 09/25/24      Frequency of Treatment: 1 x a week tapering to every other week  Duration of  Treatment: 10 weeks    Recommended Referrals to Other Professionals:   Education Assessment:   Learner/Method: No Barriers to Learning  Education Comments: print outs    Risks and benefits of evaluation/treatment have been explained.   Patient/Family/caregiver agrees with Plan of Care.     Evaluation Time:     PT Eval, Low Complexity Minutes (15807): 20       Signing Clinician: REINA Christianson [facility]                                                                                   OUTPATIENT PHYSICAL THERAPY      PLAN OF TREATMENT FOR OUTPATIENT REHABILITATION   Patient's Last Name, First Name, FLORIDAZEE ValeraeVicky YOB: 1935   Provider's Name   [facility]   Medical Record No.  [id number]     Onset Date: 07/17/23  Start of Care Date: 07/17/24     Medical Diagnosis:  Cervical Pain, Radicular pain of left upper extremity      PT Treatment Diagnosis:  Cervical Pain and left radicular symptoms Plan of Treatment  Frequency/Duration: 1 x a week tapering to every other week/ 10 weeks    Certification date from 07/17/24 to 09/25/24         See note for plan of treatment details and functional goals     Zachariah Gomez, PT                         I CERTIFY THE NEED FOR THESE SERVICES FURNISHED UNDER        THIS PLAN OF TREATMENT AND WHILE UNDER MY CARE .             Physician Signature               Date    X_____________________________________________________                  Referring Provider:  Judy Charles    Initial Assessment  See Epic Evaluation- Start of Care Date: 07/17/24

## 2024-07-18 ENCOUNTER — TRANSCRIBE ORDERS (OUTPATIENT)
Dept: OTHER | Age: 89
End: 2024-07-18

## 2024-07-18 DIAGNOSIS — M54.2 NECK PAIN: ICD-10-CM

## 2024-07-18 DIAGNOSIS — R20.0 NUMBNESS AND TINGLING IN RIGHT HAND: ICD-10-CM

## 2024-07-18 DIAGNOSIS — M79.2 RADICULAR PAIN OF LEFT UPPER EXTREMITY: ICD-10-CM

## 2024-07-18 DIAGNOSIS — R20.2 NUMBNESS AND TINGLING IN RIGHT HAND: ICD-10-CM

## 2024-08-02 ENCOUNTER — THERAPY VISIT (OUTPATIENT)
Dept: PHYSICAL THERAPY | Facility: CLINIC | Age: 89
End: 2024-08-02
Payer: COMMERCIAL

## 2024-08-02 DIAGNOSIS — M54.2 CERVICAL PAIN: Primary | ICD-10-CM

## 2024-08-02 DIAGNOSIS — M54.2 NECK PAIN: ICD-10-CM

## 2024-08-02 DIAGNOSIS — M79.2 RADICULAR PAIN OF LEFT UPPER EXTREMITY: ICD-10-CM

## 2024-08-02 PROCEDURE — 97140 MANUAL THERAPY 1/> REGIONS: CPT | Mod: GP | Performed by: PHYSICAL THERAPIST

## 2024-08-02 PROCEDURE — 97110 THERAPEUTIC EXERCISES: CPT | Mod: GP | Performed by: PHYSICAL THERAPIST

## 2024-08-08 ENCOUNTER — THERAPY VISIT (OUTPATIENT)
Dept: PHYSICAL THERAPY | Facility: CLINIC | Age: 89
End: 2024-08-08
Payer: COMMERCIAL

## 2024-08-08 DIAGNOSIS — M79.2 RADICULAR PAIN OF LEFT UPPER EXTREMITY: Primary | ICD-10-CM

## 2024-08-08 DIAGNOSIS — M54.2 CERVICAL PAIN: ICD-10-CM

## 2024-08-08 PROCEDURE — 97110 THERAPEUTIC EXERCISES: CPT | Mod: GP | Performed by: PHYSICAL THERAPIST

## 2024-08-08 PROCEDURE — 97140 MANUAL THERAPY 1/> REGIONS: CPT | Mod: GP | Performed by: PHYSICAL THERAPIST

## 2024-08-14 ENCOUNTER — THERAPY VISIT (OUTPATIENT)
Dept: OCCUPATIONAL THERAPY | Facility: CLINIC | Age: 89
End: 2024-08-14
Payer: COMMERCIAL

## 2024-08-14 DIAGNOSIS — R20.2 NUMBNESS AND TINGLING IN RIGHT HAND: ICD-10-CM

## 2024-08-14 DIAGNOSIS — R20.0 NUMBNESS AND TINGLING IN RIGHT HAND: ICD-10-CM

## 2024-08-14 DIAGNOSIS — M25.532 PAIN IN BOTH WRISTS: Primary | ICD-10-CM

## 2024-08-14 DIAGNOSIS — M25.531 PAIN IN BOTH WRISTS: Primary | ICD-10-CM

## 2024-08-14 PROCEDURE — 97535 SELF CARE MNGMENT TRAINING: CPT | Mod: GO | Performed by: OCCUPATIONAL THERAPIST

## 2024-08-14 PROCEDURE — 97110 THERAPEUTIC EXERCISES: CPT | Mod: GO | Performed by: OCCUPATIONAL THERAPIST

## 2024-08-14 PROCEDURE — 97165 OT EVAL LOW COMPLEX 30 MIN: CPT | Mod: GO | Performed by: OCCUPATIONAL THERAPIST

## 2024-08-14 NOTE — PROGRESS NOTES
OCCUPATIONAL THERAPY EVALUATION  Type of Visit: Evaluation        Fall Risk Screen:  Fall screen completed by: PT  Have you fallen 2 or more times in the past year?: No  Have you fallen and had an injury in the past year?: No  Is patient a fall risk?: No    Subjective      Presenting condition or subjective complaint: mri suggested therapy  Date of onset: 07/18/24 (order date)    Relevant medical history:     Past Medical History:   Diagnosis Date    Abnormal finding on thyroid function test 11/10/2015    Hyperlipidemia     Hypertension     Neuropathy     Subclinical hypothyroidism 11/10/2015    Thyroid nodule 11/10/2015      Dates & types of surgery:    Past Surgical History:   Procedure Laterality Date    CATARACT IOL, RT/LT      ENT SURGERY   Spring 2017    throat cyst removal    PHACOEMULSIFICATION CLEAR CORNEA WITH STANDARD INTRAOCULAR LENS IMPLANT  1/5/2012    Procedure:PHACOEMULSIFICATION CLEAR CORNEA WITH STANDARD INTRAOCULAR LENS IMPLANT; LEFT PHACOEMULSIFICATION CLEAR CORNEA WITH STANDARD LENS IMPLANT ; Surgeon:TUNDE GARDNER; Location:[facility]    VITRECTOMY ANTERIOR          Prior diagnostic imaging/testing results: MRI; EMG     Prior therapy history for the same diagnosis, illness or injury:        Prior Level of Function  Transfers: Independent  Ambulation: Independent  ADL: Independent  IADL:  IND    Living Environment  Social support: Alone   Type of home: Apartment/condo   Stairs to enter the home: No       Ramp: No   Stairs inside the home: No       Help at home: None  Equipment owned:       Employment: No    Hobbies/Interests: playing piano  eating out   visiting interesting places    Patient goals for therapy:   decrease numbness    Pain assessment: Pain present     Objective   ADDITIONAL HISTORY:  Right hand dominant  Patient reports symptoms of pain, weakness/loss of strength, edema, numbness, and tingling   Transportation: drives    Functional Outcome Measure:   Upper Extremity Functional  Index Score:  SCORE:   Column Totals: /80: 74   (A lower score indicates greater disability.)       PAIN:  Pain Level at Rest: 0/10  Pain Level with Use: 4/10  Pain Location: thumb  Pain Quality: Aching, Dull, and Shooting  Pain Frequency: intermittent  Pain is Worst: daytime  Pain is Exacerbated By: unsure  Pain is Relieved By: brace  Pain Progression: Unchanged        EDEMA: Mild     SENSATION: Glove Like per pt report     ROM:   Wrist ROM  Left AROM Right AROM    Extension 60 46   Flexion 65 36   Radial Deviation (RD) 15 15   Ulnar Deviation (UD) 35 16   Supination 90 70   Pronation 90 90     Hand WFL    SPECIAL TESTS:   CTS Special Tests  Pain Report Left Right   Median Nerve Compression at Pronator - +24   Livingston Test for Lumbrical Incursion (fist x30 sec) - -   Tinel's at Carpal Tunnel - -   Phalen's Sign + minimal + minima       NEURAL TENSION TESTING: MNT: Median Neurodynamic Test (based on PHILOMENA Hodge's ULNT)   8/14/2024   0-5 Scale R:5/5 + L:5/5 +   Position:   0/5: Arm across abdomen in coronal plane  1/5: Depress shoulder, ER to neutral ABD shoulder to 45 degrees  2/5: ER shoulder to end range, keep elbow at 90 degrees  3/5: Extend elbow to 0 degrees  4/5: Fully supinate forearm  5/5: Extend wrist, fingers and thumb  Notes:  (+) indicates beyond grade level but less than half-way to next level  (-) indicates over half-way to level  S1 onset/change of patient's symptoms  S2 definite stop point based on patient's discomfort level    STRENGTH:     Measured in pounds 8/14/2024 8/14/2024    Left Right   Trial 1 32 25     Lateral Pinch  Measured in pounds 8/14/2024 8/14/2024    Left Right   Trial 1 9 8     3 Point Pinch  Measured in pounds 8/14/2024 8/14/2024    Left Right   Trial 1 9 + thumb cmc pain 9       Median Nerve MMT (Scale of 0-5/5) 8/14/2024 8/14/2024    Left Right   Pronator Teres  5   FCR  5   FDS  5   FDP I, II  5   FPL  5   APB  5   Opponens Pollicis  5   FPB  5   Lumbricals I, II  5      PALPATION:   Median Nerve Palpation    Bicep Muscle ++   Distal Bicep Tendon +   Pronator Teres  +   Carpal Tunnel -       Assessment & Plan   CLINICAL IMPRESSIONS  Medical Diagnosis: B CTS    Treatment Diagnosis: B CTS    Impression/Assessment: Pt is a 89 year old female presenting to Occupational Therapy due to Bilateral CTS.  The following significant findings have been identified: Impaired ROM and Impaired sensation.  These identified deficits interfere with their ability to perform self care tasks, recreational activities, and household chores as compared to previous level of function.     Clinical Decision Making (Complexity):  Assessment of Occupational Performance: 1-3 Performance Deficits  Occupational Performance Limitations: home establishment and management and meal preparation and cleanup  Clinical Decision Making (Complexity): Low complexity    PLAN OF CARE  Treatment Interventions:  Modalities:  US, Iontophoresis, Fluidotherapy, Paraffin, TENS, and E-Stim  Therapeutic Exercise:  AROM, AAROM, PROM, Tendon Gliding, Blocking, Reverse Blocking, Place and Hold, Contract Relax, Extensor Tracking, Isotonics, Isometrics, and Stabilization  Neuromuscular re-education:  Nerve Gliding, Coordination/Dexterity, Sensory re-education, Desensitization, Kinesthetic Training, Proprioceptive Training, Posture, Kinesiotaping, Isometrics, and Stabilization  Manual Techniques:  Coordination/Dexterity, Joint mobilization, Scar mobilization, Friction massage, Myofascial release, and Manual edema mobilization  Orthotic Fabrication:  Static, Static progressive, and Dynamic  Self Care:  Self Care Tasks, Ergonomic Considerations, and Work Tasks    Long Term Goals   OT Goal 1  Goal Identifier: piano  Goal Description: Pt will play piano for 30 mins w/o nerve sx.  Rationale: In order to maximize safety and independence with performance of self-care activities  Goal Progress: Initial  Target Date: 10/14/24      Frequency of  Treatment: 1x a week  Duration of Treatment: 8 weeks     Recommended Referrals to Other Professionals:   Education Assessment: Learner/Method: Patient;Demonstration;Pictures/Video;No Barriers to Learning     Risks and benefits of evaluation/treatment have been explained.   Patient/Family/caregiver agrees with Plan of Care.     Evaluation Time:    OT Olivia, Low Complexity Minutes (11513): 20       Signing Clinician: ANGEL Garcia [facility]                                                                                   OUTPATIENT OCCUPATIONAL THERAPY      PLAN OF TREATMENT FOR OUTPATIENT REHABILITATION   Patient's Last Name, First Name, FLORIDAVicky Quintero YOB: 1935   Provider's Name   [facility]   Medical Record No.  [id number]     Onset Date: 07/18/24 (order date) Start of Care Date: 08/14/24     Medical Diagnosis:  B CTS      OT Treatment Diagnosis:  B CTS Plan of Treatment  Frequency/Duration:1x a week/8 weeks    Certification date from 08/14/24   To 10/14/24        See note for plan of treatment details and functional goals     ANGEL Garcia                         I CERTIFY THE NEED FOR THESE SERVICES FURNISHED UNDER        THIS PLAN OF TREATMENT AND WHILE UNDER MY CARE     (Physician attestation of this document indicates review and certification of the therapy plan).              Referring Provider:  Judy Charles    Initial Assessment  See Epic Evaluation- 08/14/24

## 2024-08-15 ENCOUNTER — THERAPY VISIT (OUTPATIENT)
Dept: PHYSICAL THERAPY | Facility: CLINIC | Age: 89
End: 2024-08-15
Payer: COMMERCIAL

## 2024-08-15 DIAGNOSIS — M54.2 CERVICAL PAIN: ICD-10-CM

## 2024-08-15 DIAGNOSIS — M79.2 RADICULAR PAIN OF LEFT UPPER EXTREMITY: Primary | ICD-10-CM

## 2024-08-15 PROCEDURE — 97110 THERAPEUTIC EXERCISES: CPT | Mod: GP | Performed by: PHYSICAL THERAPIST

## 2024-08-15 NOTE — PROGRESS NOTES
DISCHARGE  Reason for Discharge: Patient has met all goals.    Equipment Issued:     Discharge Plan: Patient to continue home program.    Referring Provider:  Judy Charles       08/15/24 0500   Appointment Info   Signing clinician's name / credentials Zachariah Gomez DPT   Total/Authorized Visits 6 ([employer])   Visits Used 4   Medical Diagnosis Cervical Pain, Radicular pain of left upper extremity   PT Tx Diagnosis Cervical Pain and left radicular symptoms   Quick Adds Certification   Progress Note/Certification   Start of Care Date 07/17/24   Onset of illness/injury or Date of Surgery 07/17/23   Therapy Frequency 1 x a week tapering to every other week   Predicted Duration 10 weeks   Certification date from 07/17/24   Certification date to 09/25/24   Progress Note Completed Date 07/17/24   PT Goal 1   Goal Identifier Goal   Goal Description Chintan will be able to perform a task with her hands for 20 minutes such as cleaning, computer work and playing the piano with pain at worst 2/10   Rationale to maximize safety and independence with self cares;to maximize safety and independence with transportation;to maximize safety and independence within the community;to maximize safety and independence within the home;to maximize safety and independence with performance of ADLs and functional tasks   Target Date 09/25/24   Date Met 08/15/24   Subjective Report   Subjective Report Doing well has had little to no pain at all. Has only noticed pain after playing the piano for 45 minutes.   Objective Measures   Objective Measures Objective Measure 1   Objective Measure 1   Objective Measure still TTP L UT but better shoulder control   Treatment Interventions (PT)   Interventions Therapeutic Procedure/Exercise;Manual Therapy   Therapeutic Procedure/Exercise   Therapeutic Procedures: strength, endurance, ROM, flexibility minutes (17599) 25   Therapeutic Procedures Ther Proc 2;Ther Proc 3;Ther Proc 4;Ther Proc 5  "  Ther Proc 1 Scapular retraction TB   Ther Proc 1 - Details RTB 2 x 15   Ther Proc 2 Row   Ther Proc 2 - Details RTB 2 x 15   Ther Proc 3 Shoulder Extension TB   Ther Proc 3 - Details RTB 2 x 15 better decreasing UT activation   Ther Proc 4 Scaption in mirror   Ther Proc 4 - Details 2 x 15 hold 5 seconds pt was holding 1-2 minutes at home   PTRx Ther Proc 1 Upper Trapezius Stretch   PTRx Ther Proc 1 - Details 3 x 30\"   PTRx Ther Proc 2 Scapular Retraction/Depression   PTRx Ther Proc 2 - Details HEP   Skilled Intervention tolerated well VC   Patient Response/Progress tolerated well   Ther Proc 5 Education   Ther Proc 5 - Details discussed continuation of exercises, how to progress and contiue playing the piano   Manual Therapy   Manual Therapy: Mobilization, MFR, MLD, friction massage minutes (53680) 10   Manual Therapy 1 STM left upper trap and levator   Manual Therapy 1 - Details to dec neck tension   Patient Response/Progress felt better after no pain   Education   Learner/Method No Barriers to Learning   Education Comments print outs   Plan   Plan for next session DC to HEP   Total Session Time   Timed Code Treatment Minutes 35   Total Treatment Time (sum of timed and untimed services) 35         "

## 2024-08-16 ENCOUNTER — LAB (OUTPATIENT)
Dept: LAB | Facility: CLINIC | Age: 89
End: 2024-08-16
Payer: COMMERCIAL

## 2024-08-16 DIAGNOSIS — I10 HTN, GOAL BELOW 150/90: ICD-10-CM

## 2024-08-16 DIAGNOSIS — E78.5 HYPERLIPIDEMIA LDL GOAL <130: ICD-10-CM

## 2024-08-16 DIAGNOSIS — R73.9 BLOOD SUGAR INCREASED: ICD-10-CM

## 2024-08-16 LAB
ALBUMIN SERPL BCG-MCNC: 4.3 G/DL (ref 3.5–5.2)
ALP SERPL-CCNC: 78 U/L (ref 40–150)
ALT SERPL W P-5'-P-CCNC: 15 U/L (ref 0–50)
ANION GAP SERPL CALCULATED.3IONS-SCNC: 12 MMOL/L (ref 7–15)
AST SERPL W P-5'-P-CCNC: 19 U/L (ref 0–45)
BILIRUB SERPL-MCNC: 0.5 MG/DL
BUN SERPL-MCNC: 12.3 MG/DL (ref 8–23)
CALCIUM SERPL-MCNC: 9.1 MG/DL (ref 8.8–10.4)
CHLORIDE SERPL-SCNC: 101 MMOL/L (ref 98–107)
CHOLEST SERPL-MCNC: 185 MG/DL
CREAT SERPL-MCNC: 0.59 MG/DL (ref 0.51–0.95)
CREAT UR-MCNC: 11 MG/DL
EGFRCR SERPLBLD CKD-EPI 2021: 86 ML/MIN/1.73M2
ERYTHROCYTE [DISTWIDTH] IN BLOOD BY AUTOMATED COUNT: 11.8 % (ref 10–15)
FASTING STATUS PATIENT QL REPORTED: YES
FASTING STATUS PATIENT QL REPORTED: YES
GLUCOSE SERPL-MCNC: 110 MG/DL (ref 70–99)
HBA1C MFR BLD: 6.1 % (ref 0–5.6)
HCO3 SERPL-SCNC: 26 MMOL/L (ref 22–29)
HCT VFR BLD AUTO: 41.6 % (ref 35–47)
HDLC SERPL-MCNC: 45 MG/DL
HGB BLD-MCNC: 14.1 G/DL (ref 11.7–15.7)
LDLC SERPL CALC-MCNC: 92 MG/DL
MCH RBC QN AUTO: 31.9 PG (ref 26.5–33)
MCHC RBC AUTO-ENTMCNC: 33.9 G/DL (ref 31.5–36.5)
MCV RBC AUTO: 94 FL (ref 78–100)
MICROALBUMIN UR-MCNC: <12 MG/L
MICROALBUMIN/CREAT UR: NORMAL MG/G{CREAT}
NONHDLC SERPL-MCNC: 140 MG/DL
PLATELET # BLD AUTO: 298 10E3/UL (ref 150–450)
POTASSIUM SERPL-SCNC: 3.9 MMOL/L (ref 3.4–5.3)
PROT SERPL-MCNC: 7.2 G/DL (ref 6.4–8.3)
RBC # BLD AUTO: 4.42 10E6/UL (ref 3.8–5.2)
SODIUM SERPL-SCNC: 139 MMOL/L (ref 135–145)
TRIGL SERPL-MCNC: 242 MG/DL
TSH SERPL DL<=0.005 MIU/L-ACNC: 2.46 UIU/ML (ref 0.3–4.2)
WBC # BLD AUTO: 7.6 10E3/UL (ref 4–11)

## 2024-08-16 PROCEDURE — 80053 COMPREHEN METABOLIC PANEL: CPT

## 2024-08-16 PROCEDURE — 82043 UR ALBUMIN QUANTITATIVE: CPT

## 2024-08-16 PROCEDURE — 85027 COMPLETE CBC AUTOMATED: CPT

## 2024-08-16 PROCEDURE — 80061 LIPID PANEL: CPT

## 2024-08-16 PROCEDURE — 82570 ASSAY OF URINE CREATININE: CPT

## 2024-08-16 PROCEDURE — 36415 COLL VENOUS BLD VENIPUNCTURE: CPT

## 2024-08-16 PROCEDURE — 84443 ASSAY THYROID STIM HORMONE: CPT

## 2024-08-16 PROCEDURE — 83036 HEMOGLOBIN GLYCOSYLATED A1C: CPT | Mod: GZ

## 2024-08-20 ENCOUNTER — OFFICE VISIT (OUTPATIENT)
Dept: INTERNAL MEDICINE | Facility: CLINIC | Age: 89
End: 2024-08-20
Payer: COMMERCIAL

## 2024-08-20 VITALS
OXYGEN SATURATION: 96 % | RESPIRATION RATE: 14 BRPM | TEMPERATURE: 98.1 F | BODY MASS INDEX: 25.27 KG/M2 | HEART RATE: 64 BPM | SYSTOLIC BLOOD PRESSURE: 148 MMHG | WEIGHT: 137.3 LBS | HEIGHT: 62 IN | DIASTOLIC BLOOD PRESSURE: 64 MMHG

## 2024-08-20 DIAGNOSIS — E78.5 HYPERLIPIDEMIA LDL GOAL <130: ICD-10-CM

## 2024-08-20 DIAGNOSIS — Z00.00 ROUTINE GENERAL MEDICAL EXAMINATION AT A HEALTH CARE FACILITY: Primary | ICD-10-CM

## 2024-08-20 DIAGNOSIS — I10 HTN, GOAL BELOW 150/90: ICD-10-CM

## 2024-08-20 PROCEDURE — G0439 PPPS, SUBSEQ VISIT: HCPCS | Performed by: INTERNAL MEDICINE

## 2024-08-20 PROCEDURE — 99214 OFFICE O/P EST MOD 30 MIN: CPT | Mod: 25 | Performed by: INTERNAL MEDICINE

## 2024-08-20 RX ORDER — LOSARTAN POTASSIUM 50 MG/1
50 TABLET ORAL 2 TIMES DAILY
Qty: 180 TABLET | Refills: 4 | Status: SHIPPED | OUTPATIENT
Start: 2024-08-20

## 2024-08-20 RX ORDER — PRAVASTATIN SODIUM 20 MG
20 TABLET ORAL DAILY
Qty: 90 TABLET | Refills: 4 | Status: SHIPPED | OUTPATIENT
Start: 2024-08-20

## 2024-08-20 RX ORDER — AMLODIPINE BESYLATE 5 MG/1
TABLET ORAL
Qty: 90 TABLET | Refills: 4 | Status: SHIPPED | OUTPATIENT
Start: 2024-08-20

## 2024-08-20 RX ORDER — METOPROLOL SUCCINATE 50 MG/1
50 TABLET, EXTENDED RELEASE ORAL 2 TIMES DAILY
Qty: 180 TABLET | Refills: 4 | Status: SHIPPED | OUTPATIENT
Start: 2024-08-20

## 2024-08-20 SDOH — HEALTH STABILITY: PHYSICAL HEALTH: ON AVERAGE, HOW MANY DAYS PER WEEK DO YOU ENGAGE IN MODERATE TO STRENUOUS EXERCISE (LIKE A BRISK WALK)?: 4 DAYS

## 2024-08-20 ASSESSMENT — SOCIAL DETERMINANTS OF HEALTH (SDOH): HOW OFTEN DO YOU GET TOGETHER WITH FRIENDS OR RELATIVES?: MORE THAN THREE TIMES A WEEK

## 2024-08-20 NOTE — PROGRESS NOTES
Dr Hood's note    Patient's instructions / PLAN:                                                        Plan:  Continue same meds, same doses for now   2. Next ANNUAL EXAM after Aug 21, 2025  3.  The following vaccines are recommended for you. Please check with your insurance about coverage.  Some insurances cover better if you have these vaccines at the pharmacy:  -- RSV, Flu / Covid   -- Pneumonia 20   -- Tetanus vaccine with whooping cough  -- Tdap      ASSESSMENT & PLAN:                                                      (Z00.00) Routine general medical examination at a health care facility  (primary encounter diagnosis)  Comment:   Plan:     (I10) HTN, goal below 150/90  Comment: Controlled  for age   Plan: amLODIPine (NORVASC) 5 MG tablet, losartan         (COZAAR) 50 MG tablet, metoprolol succinate ER         (TOPROL XL) 50 MG 24 hr tablet            (E78.5) Hyperlipidemia LDL goal <130  Comment:   Plan: pravastatin (PRAVACHOL) 20 MG tablet               Chief Complaint:                                                        Annual exam  Follow up chronic medical problems      SUBJECTIVE:                                                    History of present illness     We reviewed the chronic medical problems as above.   I reviewed the recent tests results in Epic       Aug 16 labs -- Discussed  - in good range for age    No acute c/o       ROS:                                                      ROS: negative for fever, chills, cough, wheezes, chest pain, shortness of breath, vomiting, abdominal pain, leg swelling     PMHx: - reviewed  Past Medical History:   Diagnosis Date    Abnormal finding on thyroid function test 11/10/2015    Hyperlipidemia     Hypertension     Neuropathy     Subclinical hypothyroidism 11/10/2015    Thyroid nodule 11/10/2015       PSHx: reviewed  Past Surgical History:   Procedure Laterality Date    CATARACT IOL, RT/LT      ENT SURGERY   Spring 2017    throat cyst removal     PHACOEMULSIFICATION CLEAR CORNEA WITH STANDARD INTRAOCULAR LENS IMPLANT  1/5/2012    Procedure:PHACOEMULSIFICATION CLEAR CORNEA WITH STANDARD INTRAOCULAR LENS IMPLANT; LEFT PHACOEMULSIFICATION CLEAR CORNEA WITH STANDARD LENS IMPLANT ; Surgeon:TUNDE GARDNER; Location: [facility]    VITRECTOMY ANTERIOR          Soc Hx: No daily alcohol, no smoking  Social History     Socioeconomic History    Marital status:      Spouse name: Not on file    Number of children: Not on file    Years of education: Not on file    Highest education level: Not on file   Occupational History    Not on file   Tobacco Use    Smoking status: Never     Passive exposure: Never    Smokeless tobacco: Never   Vaping Use    Vaping status: Never Used   Substance and Sexual Activity    Alcohol use: No     Alcohol/week: 0.0 standard drinks of alcohol    Drug use: No    Sexual activity: Not Currently     Partners: Male   Other Topics Concern    Parent/sibling w/ CABG, MI or angioplasty before 65F 55M? Not Asked     Service Not Asked    Blood Transfusions Not Asked    Caffeine Concern No     Comment: a couple iced teas daily    Occupational Exposure Not Asked    Hobby Hazards Not Asked    Sleep Concern Not Asked    Stress Concern Not Asked    Weight Concern Not Asked    Special Diet No    Back Care Not Asked    Exercise No     Comment: none recently    Bike Helmet Not Asked    Seat Belt Not Asked    Self-Exams Not Asked   Social History Narrative    Not on file     Social Determinants of Health     Financial Resource Strain: Low Risk  (8/20/2024)    Financial Resource Strain     Within the past 12 months, have you or your family members you live with been unable to get utilities (heat, electricity) when it was really needed?: No   Food Insecurity: Low Risk  (8/20/2024)    Food Insecurity     Within the past 12 months, did you worry that your food would run out before you got money to buy more?: No     Within the past 12 months, did the food  you bought just not last and you didn t have money to get more?: No   Transportation Needs: Low Risk  (8/20/2024)    Transportation Needs     Within the past 12 months, has lack of transportation kept you from medical appointments, getting your medicines, non-medical meetings or appointments, work, or from getting things that you need?: No   Physical Activity: Unknown (8/20/2024)    Exercise Vital Sign     Days of Exercise per Week: 4 days     Minutes of Exercise per Session: Not on file   Stress: No Stress Concern Present (8/20/2024)    [place] [employer] - Occupational Stress Questionnaire     Feeling of Stress : Not at all   Social Connections: Unknown (8/20/2024)    Social Connection and Isolation Panel [NHANES]     Frequency of Communication with Friends and Family: Not on file     Frequency of Social Gatherings with Friends and Family: More than three times a week     Attends Taoist Services: Not on file     Active Member of Clubs or Organizations: Not on file     Attends Club or Organization Meetings: Not on file     Marital Status: Not on file   Interpersonal Safety: Low Risk  (8/20/2024)    Interpersonal Safety     Do you feel physically and emotionally safe where you currently live?: Yes     Within the past 12 months, have you been hit, slapped, kicked or otherwise physically hurt by someone?: No     Within the past 12 months, have you been humiliated or emotionally abused in other ways by your partner or ex-partner?: No   Housing Stability: Low Risk  (8/20/2024)    Housing Stability     Do you have housing? : Yes     Are you worried about losing your housing?: No        Fam Hx: reviewed  Family History   Problem Relation Age of Onset    Cancer Mother     Family History Negative Father          Screening: reviewed      All: reviewed    Meds: reviewed  Current Outpatient Medications   Medication Sig Dispense Refill    amLODIPine (NORVASC) 5 MG tablet TAKE 1 TABLET BY MOUTH EVERY DAY  "90 tablet 1    hydrochlorothiazide (HYDRODIURIL) 12.5 MG tablet Take 1 tablet (12.5 mg) by mouth daily 90 tablet 1    losartan (COZAAR) 50 MG tablet Take 1 tablet (50 mg) by mouth 2 times daily 180 tablet 1    metoprolol succinate ER (TOPROL XL) 50 MG 24 hr tablet Take 1 tablet (50 mg) by mouth 2 times daily 180 tablet 4    Multiple Vitamins-Minerals (WOMENS 50+ MULTI VITAMIN/MIN PO) Take 1 tablet by mouth daily      pravastatin (PRAVACHOL) 20 MG tablet Take 1 tablet (20 mg) by mouth daily 90 tablet 4    aspirin 81 MG EC tablet Take 1 tablet (81 mg) by mouth daily (Patient not taking: Reported on 8/20/2024)         OBJECTIVE:                                                    Physical Exam :  Blood pressure (!) 148/64, pulse 64, temperature 98.1  F (36.7  C), temperature source Oral, resp. rate 14, height 1.562 m (5' 1.5\"), weight 62.3 kg (137 lb 4.8 oz), SpO2 96%, not currently breastfeeding.     NAD, appears comfortable  Skin clear, no rashes  Neck: supple, no JVD,  no thyroidmegaly  Lymph nodes non palpable in the cervical, supraclavicular axillaries,   Chest: clear to auscultation with good respiratory effort  Cardiac: S1S2, RRR, no mgr appreciated  Abdomen: soft, not tender, not distended, audible bowel sound, no hepatosplenomegaly, no palpable masses, no abdominal bruits  Extremities: no cyanosis, clubbing or edema.   Neuro: A, Ox3, no focal signs.  Breast exam in supine and erect position: they are symmetrical, no skin changes, no tenderness or nodes on palpation. Nipples are erect, no skin lesions, no discharge on pressure.    Pelvic exam: deferred, s/p menopause, no symptoms, no hx of abnormal pap        Patient has been advised of split billing requirements and indicates understanding: Yes.  At the check in, at the      Leatha Hood MD  Internal Medicine       #####################################  Preventive Care Visit  [facility]  Leatha Conroy MD, " Internal Medicine  Aug 20, 2024          Anthony Perry is a 89 year old, presenting for the following:  Wellness Visit        8/20/2024    12:48 PM   Additional Questions   Roomed by azamit   Accompanied by self         8/20/2024    12:48 PM   Patient Reported Additional Medications   Patient reports taking the following new medications none         Health Care Directive  Patient does not have a Health Care Directive or Living Will: Patient states has Advance Directive and will bring in a copy to clinic.    HPI              8/20/2024   General Health   How would you rate your overall physical health? Good   Feel stress (tense, anxious, or unable to sleep) Not at all            8/20/2024   Nutrition   Diet: Low salt    Gluten-free/reduced       Multiple values from one day are sorted in reverse-chronological order         8/20/2024   Exercise   Days per week of moderate/strenous exercise 4 days            8/20/2024   Social Factors   Frequency of gathering with friends or relatives More than three times a week   Worry food won't last until get money to buy more No   Food not last or not have enough money for food? No   Do you have housing? (Housing is defined as stable permanent housing and does not include staying ouside in a car, in a tent, in an abandoned building, in an overnight shelter, or couch-surfing.) Yes   Are you worried about losing your housing? No   Lack of transportation? No   Unable to get utilities (heat,electricity)? No            8/20/2024   Fall Risk   Fallen 2 or more times in the past year? No    No   Trouble with walking or balance? Yes    Yes   Reason Gait Speed Test Not Completed Patient declines       Multiple values from one day are sorted in reverse-chronological order          8/20/2024   Activities of Daily Living- Home Safety   Needs help with the following daily activites None of the above   Safety concerns in the home None of the above            8/20/2024   Dental   Dentist  two times every year? Yes            8/20/2024   Hearing Screening   Hearing concerns? (!) TROUBLE UNDERSTANDING SOFT OR WHISPERED SPEECH.            8/20/2024   Driving Risk Screening   Patient/family members have concerns about driving No            8/20/2024   General Alertness/Fatigue Screening   Have you been more tired than usual lately? No            8/20/2024   Urinary Incontinence Screening   Bothered by leaking urine in past 6 months No            8/20/2024   TB Screening   Were you born outside of the [place]? No            Today's PHQ-2 Score:       8/20/2024    12:44 PM   PHQ-2 ( 1999 [employer])   Q1: Little interest or pleasure in doing things 0   Q2: Feeling down, depressed or hopeless 0   PHQ-2 Score 0   Q1: Little interest or pleasure in doing things Not at all   Q2: Feeling down, depressed or hopeless Not at all   PHQ-2 Score 0           8/20/2024   Substance Use   Alcohol more than 3/day or more than 7/wk Not Applicable   Do you have a current opioid prescription? No   How severe/bad is pain from 1 to 10? 2/10   Do you use any other substances recreationally? No        Social History     Tobacco Use    Smoking status: Never     Passive exposure: Never    Smokeless tobacco: Never   Vaping Use    Vaping status: Never Used   Substance Use Topics    Alcohol use: No     Alcohol/week: 0.0 standard drinks of alcohol    Drug use: No                    Reviewed and updated as needed this visit by Provider                      Current providers sharing in care for this patient include:  Patient Care Team:  Leatha Conroy MD as PCP - General (Internal Medicine)  Leatha Conroy MD as Assigned PCP    The following health maintenance items are reviewed in Epic and correct as of today:  Health Maintenance   Topic Date Due    RSV VACCINE (Pregnancy & 60+) (1 - 1-dose 60+ series) Never done    DTAP/TDAP/TD IMMUNIZATION (2 - Td or Tdap) 02/24/2023    COVID-19 Vaccine (7 - 2023-24 season)  "03/06/2024    ANNUAL REVIEW OF HM ORDERS  07/21/2024    MEDICARE ANNUAL WELLNESS VISIT  07/21/2024    INFLUENZA VACCINE (1) 09/01/2024    LIPID  08/16/2025    FALL RISK ASSESSMENT  08/20/2025    ADVANCE CARE PLANNING  07/21/2028    DEXA  07/29/2036    PHQ-2 (once per calendar year)  Completed    Pneumococcal Vaccine: 65+ Years  Completed    ZOSTER IMMUNIZATION  Completed    IPV IMMUNIZATION  Aged Out    HPV IMMUNIZATION  Aged Out    MENINGITIS IMMUNIZATION  Aged Out    RSV MONOCLONAL ANTIBODY  Aged Out            Objective    Exam  BP (!) 151/64 (BP Location: Right arm, Patient Position: Sitting, Cuff Size: Adult Regular)   Pulse 64   Temp 98.1  F (36.7  C) (Oral)   Resp 14   Ht 1.562 m (5' 1.5\")   Wt 62.3 kg (137 lb 4.8 oz)   LMP  (LMP Unknown)   SpO2 96%   BMI 25.52 kg/m     Estimated body mass index is 25.52 kg/m  as calculated from the following:    Height as of this encounter: 1.562 m (5' 1.5\").    Weight as of this encounter: 62.3 kg (137 lb 4.8 oz).    Physical Exam           8/20/2024   Mini Cog   Clock Draw Score 2 Normal   3 Item Recall 3 objects recalled   Mini Cog Total Score 5                 Signed Electronically by: Laetha Conroy MD    "

## 2024-08-21 ENCOUNTER — THERAPY VISIT (OUTPATIENT)
Dept: OCCUPATIONAL THERAPY | Facility: CLINIC | Age: 89
End: 2024-08-21
Payer: COMMERCIAL

## 2024-08-21 DIAGNOSIS — M25.532 PAIN IN BOTH WRISTS: Primary | ICD-10-CM

## 2024-08-21 DIAGNOSIS — M25.531 PAIN IN BOTH WRISTS: Primary | ICD-10-CM

## 2024-08-21 PROCEDURE — 97112 NEUROMUSCULAR REEDUCATION: CPT | Mod: GO | Performed by: OCCUPATIONAL THERAPIST

## 2024-08-21 PROCEDURE — 97110 THERAPEUTIC EXERCISES: CPT | Mod: GO | Performed by: OCCUPATIONAL THERAPIST

## 2024-08-28 ENCOUNTER — THERAPY VISIT (OUTPATIENT)
Dept: OCCUPATIONAL THERAPY | Facility: CLINIC | Age: 89
End: 2024-08-28
Payer: COMMERCIAL

## 2024-08-28 DIAGNOSIS — M25.532 PAIN IN BOTH WRISTS: Primary | ICD-10-CM

## 2024-08-28 DIAGNOSIS — M25.531 PAIN IN BOTH WRISTS: Primary | ICD-10-CM

## 2024-08-28 PROCEDURE — 97110 THERAPEUTIC EXERCISES: CPT | Mod: GO | Performed by: SPECIALIST/TECHNOLOGIST

## 2024-08-28 PROCEDURE — 97140 MANUAL THERAPY 1/> REGIONS: CPT | Mod: GO | Performed by: SPECIALIST/TECHNOLOGIST

## 2024-09-11 ENCOUNTER — THERAPY VISIT (OUTPATIENT)
Dept: OCCUPATIONAL THERAPY | Facility: CLINIC | Age: 89
End: 2024-09-11
Payer: COMMERCIAL

## 2024-09-11 DIAGNOSIS — M25.532 PAIN IN BOTH WRISTS: Primary | ICD-10-CM

## 2024-09-11 DIAGNOSIS — M25.531 PAIN IN BOTH WRISTS: Primary | ICD-10-CM

## 2024-09-11 PROCEDURE — 97110 THERAPEUTIC EXERCISES: CPT | Mod: GO | Performed by: SPECIALIST/TECHNOLOGIST

## 2024-09-11 PROCEDURE — 97535 SELF CARE MNGMENT TRAINING: CPT | Mod: GO | Performed by: SPECIALIST/TECHNOLOGIST

## 2024-10-03 PROBLEM — M54.2 CERVICAL PAIN: Status: RESOLVED | Noted: 2024-07-17 | Resolved: 2024-10-03

## 2024-10-03 PROBLEM — M79.2 RADICULAR PAIN OF LEFT UPPER EXTREMITY: Status: RESOLVED | Noted: 2024-07-17 | Resolved: 2024-10-03

## 2024-10-28 PROBLEM — M25.532 PAIN IN BOTH WRISTS: Status: RESOLVED | Noted: 2024-08-14 | Resolved: 2024-10-28

## 2024-10-28 PROBLEM — M25.531 PAIN IN BOTH WRISTS: Status: RESOLVED | Noted: 2024-08-14 | Resolved: 2024-10-28

## 2024-10-30 ENCOUNTER — TRANSFERRED RECORDS (OUTPATIENT)
Dept: HEALTH INFORMATION MANAGEMENT | Facility: CLINIC | Age: 89
End: 2024-10-30
Payer: COMMERCIAL

## 2025-02-10 ENCOUNTER — TELEPHONE (OUTPATIENT)
Dept: INTERNAL MEDICINE | Facility: CLINIC | Age: OVER 89
End: 2025-02-10
Payer: COMMERCIAL

## 2025-02-10 NOTE — TELEPHONE ENCOUNTER
S-(situation): patient asking if she needs to complete CT     B-(background): patient was seen in [facility] and was recommended CT for possible kidney stone.     A-(assessment): patient reports blood in urine, lower back pain, urgency/frequency. Neg for UTI. See UC notes from today.     R-(recommendations): Writer advised the patient's symptoms are symptoms of kidney stone and can only be determined with imaging. Writer advised could ask [facility] to see patient tomorrow or we can send message to PCP if she'd like her opinion. Patient asked who could do the imaging, writer advised [facility]. Patient agreed to go to [facility].     Writer called and patient is scheduled for 1030. Updated patient that it is a 2-3 hour appointment and they will see her. Patient verbalized understanding.     Summer RN 4:58 PM February 10, 2025   [facility]

## 2025-02-11 ENCOUNTER — HOSPITAL ENCOUNTER (OUTPATIENT)
Dept: CT IMAGING | Facility: CLINIC | Age: OVER 89
Discharge: HOME | End: 2025-02-11
Attending: INTERNAL MEDICINE
Payer: COMMERCIAL

## 2025-02-11 ENCOUNTER — OFFICE VISIT (OUTPATIENT)
Dept: PEDIATRICS | Facility: CLINIC | Age: OVER 89
End: 2025-02-11
Payer: COMMERCIAL

## 2025-02-11 VITALS
HEART RATE: 61 BPM | SYSTOLIC BLOOD PRESSURE: 176 MMHG | RESPIRATION RATE: 18 BRPM | OXYGEN SATURATION: 94 % | BODY MASS INDEX: 25.47 KG/M2 | WEIGHT: 137 LBS | DIASTOLIC BLOOD PRESSURE: 72 MMHG | TEMPERATURE: 97.6 F

## 2025-02-11 DIAGNOSIS — R10.9 FLANK PAIN: Primary | ICD-10-CM

## 2025-02-11 DIAGNOSIS — R10.9 FLANK PAIN: ICD-10-CM

## 2025-02-11 DIAGNOSIS — E86.0 DEHYDRATION: ICD-10-CM

## 2025-02-11 DIAGNOSIS — R10.84 ABDOMINAL PAIN, GENERALIZED: ICD-10-CM

## 2025-02-11 DIAGNOSIS — K80.20 GALLSTONES: ICD-10-CM

## 2025-02-11 LAB
ALBUMIN SERPL BCG-MCNC: 4.1 G/DL (ref 3.5–5.2)
ALBUMIN UR-MCNC: NEGATIVE MG/DL
ALP SERPL-CCNC: 86 U/L (ref 40–150)
ALT SERPL W P-5'-P-CCNC: 15 U/L (ref 0–50)
ANION GAP SERPL CALCULATED.3IONS-SCNC: 14 MMOL/L (ref 7–15)
APPEARANCE UR: CLEAR
AST SERPL W P-5'-P-CCNC: 14 U/L (ref 0–45)
BASOPHILS # BLD AUTO: 0.1 10E3/UL (ref 0–0.2)
BASOPHILS NFR BLD AUTO: 1 %
BILIRUB SERPL-MCNC: 0.4 MG/DL
BILIRUB UR QL STRIP: NEGATIVE
BUN SERPL-MCNC: 14.4 MG/DL (ref 8–23)
CALCIUM SERPL-MCNC: 9.5 MG/DL (ref 8.8–10.4)
CHLORIDE SERPL-SCNC: 101 MMOL/L (ref 98–107)
COLOR UR AUTO: ABNORMAL
CREAT SERPL-MCNC: 0.63 MG/DL (ref 0.51–0.95)
EGFRCR SERPLBLD CKD-EPI 2021: 84 ML/MIN/1.73M2
EOSINOPHIL # BLD AUTO: 0.2 10E3/UL (ref 0–0.7)
EOSINOPHIL NFR BLD AUTO: 2 %
ERYTHROCYTE [DISTWIDTH] IN BLOOD BY AUTOMATED COUNT: 11.8 % (ref 10–15)
GLUCOSE SERPL-MCNC: 100 MG/DL (ref 70–99)
GLUCOSE UR STRIP-MCNC: NEGATIVE MG/DL
HCO3 SERPL-SCNC: 27 MMOL/L (ref 22–29)
HCT VFR BLD AUTO: 43.9 % (ref 35–47)
HGB BLD-MCNC: 14.9 G/DL (ref 11.7–15.7)
HGB UR QL STRIP: NEGATIVE
IMM GRANULOCYTES # BLD: 0.1 10E3/UL
IMM GRANULOCYTES NFR BLD: 1 %
KETONES UR STRIP-MCNC: NEGATIVE MG/DL
LEUKOCYTE ESTERASE UR QL STRIP: ABNORMAL
LYMPHOCYTES # BLD AUTO: 1.9 10E3/UL (ref 0.8–5.3)
LYMPHOCYTES NFR BLD AUTO: 21 %
MCH RBC QN AUTO: 31.6 PG (ref 26.5–33)
MCHC RBC AUTO-ENTMCNC: 33.9 G/DL (ref 31.5–36.5)
MCV RBC AUTO: 93 FL (ref 78–100)
MONOCYTES # BLD AUTO: 0.9 10E3/UL (ref 0–1.3)
MONOCYTES NFR BLD AUTO: 9 %
NEUTROPHILS # BLD AUTO: 6.2 10E3/UL (ref 1.6–8.3)
NEUTROPHILS NFR BLD AUTO: 67 %
NITRATE UR QL: NEGATIVE
NRBC # BLD AUTO: 0 10E3/UL
NRBC BLD AUTO-RTO: 0 /100
PH UR STRIP: 7.5 [PH] (ref 5–7)
PLATELET # BLD AUTO: 316 10E3/UL (ref 150–450)
POTASSIUM SERPL-SCNC: 3.8 MMOL/L (ref 3.4–5.3)
PROT SERPL-MCNC: 7.2 G/DL (ref 6.4–8.3)
RBC # BLD AUTO: 4.72 10E6/UL (ref 3.8–5.2)
RBC URINE: 1 /HPF
SODIUM SERPL-SCNC: 142 MMOL/L (ref 135–145)
SP GR UR STRIP: 1.01 (ref 1–1.03)
SQUAMOUS EPITHELIAL: <1 /HPF
UROBILINOGEN UR STRIP-MCNC: NORMAL MG/DL
WBC # BLD AUTO: 9.2 10E3/UL (ref 4–11)
WBC URINE: 1 /HPF

## 2025-02-11 PROCEDURE — 250N000011 HC RX IP 250 OP 636: Performed by: INTERNAL MEDICINE

## 2025-02-11 PROCEDURE — 74177 CT ABD & PELVIS W/CONTRAST: CPT

## 2025-02-11 PROCEDURE — 250N000009 HC RX 250: Performed by: INTERNAL MEDICINE

## 2025-02-11 RX ORDER — OMEPRAZOLE 20 MG/1
20 CAPSULE, DELAYED RELEASE ORAL DAILY
Qty: 30 CAPSULE | Refills: 0 | Status: SHIPPED | OUTPATIENT
Start: 2025-02-11

## 2025-02-11 RX ORDER — IOPAMIDOL 755 MG/ML
500 INJECTION, SOLUTION INTRAVASCULAR ONCE
Status: COMPLETED | OUTPATIENT
Start: 2025-02-11 | End: 2025-02-11

## 2025-02-11 RX ADMIN — Medication 1000 ML: at 13:56

## 2025-02-11 RX ADMIN — SODIUM CHLORIDE 50 ML: 9 INJECTION, SOLUTION INTRAVENOUS at 13:38

## 2025-02-11 RX ADMIN — IOPAMIDOL 67 ML: 755 INJECTION, SOLUTION INTRAVENOUS at 13:38

## 2025-02-11 NOTE — PROGRESS NOTES
Acute and Diagnostic Services Clinic Visit    ASSESSMENT:     1.  Low back pain.  Benign findings on exam and CT without suggestion of current problem in abdomen or retroperitoneum.   Consider low grade pyelonephritis/upper tract infection as possible explanation for symptoms.   Consider constipation though BMs have been normal for her.  Consider musculoskeletal cause of symptoms, but evaluation does not suggest this.  2.  Dehydration, given liter of saline and felt some better.  3.  Elevated blood pressure, probable white coat HTN and this is typical for her.   Home blood pressures are controlled.  Observe   4.  Asymptomatic gallstones, observe.    PLAN:  1.  Trial of aleve for a few days, 2 twice daily with food.  Risks and benefits discussed, including possible GI side effects/ulcer.   Patient has tolerated med in past and has normal renal function.  Despite her age, I feel she can use this sparingly for a little while.  2.  While on aleve, use omeprazole one daily  3.  Notify Dr. Hood for lack of resolution of symptoms or worsening.    Greater than 30 minutes were spent on chart review, patient care and assessment, and other management of this patient for this visit.         Anthony Perry is a 89 year old, presenting for the following health issues:  Flank Pain (Bilateral flank pain X 2 weeks/Recent UTI, urgency not resolving)    HPI     Abdominal/Flank Pain  Onset/Duration: X 2 weeks  Description:   Character: Dull ache  Location: right and left flank  Radiation: None  Intensity: 6/10  Progression of Symptoms:  Urinary urgency improving  Accompanying Signs & Symptoms:  Fever/chills: no   Gas/Bloating: YES- feeling bloated  Nausea: no   Vomitting: no   Diarrhea: YES- yesterday, attributes this to nerves   Constipation:no   Dysuria: no            Hematuria: YES- found 3 episodes of blood on TP            Frequency and urgency: YES           Incontinence of urine: no   History:            Last bowel  movement: yesterday-loose as noted above   Trauma: no   Previous similar pain: no    Previous tests done: UA            Previous Abdominal surgery: no   Precipitating factors:   Does the pain change with:     Food: no      Bowel Movement: no     Urination: no              Other factors: YES- main sx's is urgency and blood on TP after urinating   Therapies tried and outcome:  Antibiotic for UTI previously     When food last eaten: today @ 07:00 AM cereal and tea    Patient recently seen twice in [facility] for UTI symptoms.   Started on antibiotics then changed due to sensitivities, finished around 2/5.    Some bilateral low back and flank pain across whole lower back past couple of weeks, after UTI symptoms started.   Heat no help, symptoms persist, constant pain 5-6/10.   No meds tried.  No history of kidney stones.  Able to eat and drink a normal amount.  No overuse, muscular symptoms.    Bowels have been normal for her.  Having 1 BM daily, as per usual.  Avoiding wheat and things are better - eating gluten free.              Objective    BP (!) 176/72 (BP Location: Right arm, Patient Position: Chair, Cuff Size: Adult Regular)   Pulse 61   Temp 97.6  F (36.4  C) (Oral)   Resp 18   Wt 62.1 kg (137 lb)   LMP  (LMP Unknown)   SpO2 94%   BMI 25.47 kg/m    Body mass index is 25.47 kg/m .  Physical Exam   GENERAL: alert and no distress  NECK: no adenopathy, no asymmetry, masses, or scars  RESP: lungs clear to auscultation - no rales, rhonchi or wheezes  CV: regular rate and rhythm, normal S1 S2, no S3 or S4, no murmur, click or rub, no peripheral edema  ABDOMEN: soft, nontender, no hepatosplenomegaly, no masses and bowel sounds normal  CVA and lumbar exam benign  MS: no gross musculoskeletal defects noted, trace edema    Results for orders placed or performed in visit on 02/11/25 (from the past 24 hours)   Comprehensive metabolic panel   Result Value Ref Range    Sodium 142 135 - 145 mmol/L    Potassium 3.8 3.4 -  5.3 mmol/L    Carbon Dioxide (CO2) 27 22 - 29 mmol/L    Anion Gap 14 7 - 15 mmol/L    Urea Nitrogen 14.4 8.0 - 23.0 mg/dL    Creatinine 0.63 0.51 - 0.95 mg/dL    GFR Estimate 84 >60 mL/min/1.73m2    Calcium 9.5 8.8 - 10.4 mg/dL    Chloride 101 98 - 107 mmol/L    Glucose 100 (H) 70 - 99 mg/dL    Alkaline Phosphatase 86 40 - 150 U/L    AST 14 0 - 45 U/L    ALT 15 0 - 50 U/L    Protein Total 7.2 6.4 - 8.3 g/dL    Albumin 4.1 3.5 - 5.2 g/dL    Bilirubin Total 0.4 <=1.2 mg/dL   CBC with platelets differential    Narrative    The following orders were created for panel order CBC with platelets differential.  Procedure                               Abnormality         Status                     ---------                               -----------         ------                     CBC with platelets and d...[[id number]]                      Final result                 Please view results for these tests on the individual orders.   UA with Microscopic reflex to Culture    Specimen: Urine, Clean Catch   Result Value Ref Range    Color Urine Light Yellow Colorless, Straw, Light Yellow, Yellow    Appearance Urine Clear Clear    Glucose Urine Negative Negative mg/dL    Bilirubin Urine Negative Negative    Ketones Urine Negative Negative mg/dL    Specific Gravity Urine 1.013 1.003 - 1.035    Blood Urine Negative Negative    pH Urine 7.5 (H) 5.0 - 7.0    Protein Albumin Urine Negative Negative mg/dL    Urobilinogen Urine Normal Normal, 2.0 mg/dL    Nitrite Urine Negative Negative    Leukocyte Esterase Urine Trace (A) Negative    RBC Urine 1 <=2 /HPF    WBC Urine 1 <=5 /HPF    Squamous Epithelials Urine <1 <=1 /HPF    Narrative    Urine Culture not indicated   CBC with platelets and differential   Result Value Ref Range    WBC Count 9.2 4.0 - 11.0 10e3/uL    RBC Count 4.72 3.80 - 5.20 10e6/uL    Hemoglobin 14.9 11.7 - 15.7 g/dL    Hematocrit 43.9 35.0 - 47.0 %    MCV 93 78 - 100 fL    MCH 31.6 26.5 - 33.0 pg    MCHC 33.9 31.5 -  36.5 g/dL    RDW 11.8 10.0 - 15.0 %    Platelet Count 316 150 - 450 10e3/uL    % Neutrophils 67 %    % Lymphocytes 21 %    % Monocytes 9 %    % Eosinophils 2 %    % Basophils 1 %    % Immature Granulocytes 1 %    NRBCs per 100 WBC 0 <1 /100    Absolute Neutrophils 6.2 1.6 - 8.3 10e3/uL    Absolute Lymphocytes 1.9 0.8 - 5.3 10e3/uL    Absolute Monocytes 0.9 0.0 - 1.3 10e3/uL    Absolute Eosinophils 0.2 0.0 - 0.7 10e3/uL    Absolute Basophils 0.1 0.0 - 0.2 10e3/uL    Absolute Immature Granulocytes 0.1 <=0.4 10e3/uL    Absolute NRBCs 0.0 10e3/uL       CT ABDOMEN IMPRESSION:   1.  No acute findings or other explanation for symptoms.  2.  Cholelithiasis.    Signed Electronically by: Matti Mcclendon MD

## 2025-02-11 NOTE — PATIENT INSTRUCTIONS
PLAN:  1.  Trial of aleve for a few days, 2 twice daily with food.    2.  While on aleve, use omeprazole one daily  3.  Notify Dr. Hood for lack of resolution of symptoms or worsening.

## 2025-02-12 PROBLEM — K80.20 GALLSTONES: Status: ACTIVE | Noted: 2025-02-11

## 2025-02-12 PROBLEM — K80.20 GALLSTONES: Status: ACTIVE | Noted: 2025-02-12

## 2025-03-06 DIAGNOSIS — R10.9 FLANK PAIN: ICD-10-CM

## 2025-03-06 RX ORDER — OMEPRAZOLE 20 MG/1
CAPSULE, DELAYED RELEASE ORAL
Qty: 90 CAPSULE | Refills: 1 | Status: SHIPPED | OUTPATIENT
Start: 2025-03-06

## 2025-03-17 ENCOUNTER — OFFICE VISIT (OUTPATIENT)
Dept: INTERNAL MEDICINE | Facility: CLINIC | Age: OVER 89
End: 2025-03-17
Payer: COMMERCIAL

## 2025-03-17 VITALS
HEART RATE: 65 BPM | WEIGHT: 136 LBS | SYSTOLIC BLOOD PRESSURE: 137 MMHG | OXYGEN SATURATION: 97 % | RESPIRATION RATE: 16 BRPM | DIASTOLIC BLOOD PRESSURE: 68 MMHG | HEIGHT: 62 IN | TEMPERATURE: 97.4 F | BODY MASS INDEX: 25.03 KG/M2

## 2025-03-17 DIAGNOSIS — N39.0 URINARY TRACT INFECTION WITH HEMATURIA, SITE UNSPECIFIED: ICD-10-CM

## 2025-03-17 DIAGNOSIS — A59.09 TRICHOMONAL CERVICITIS: ICD-10-CM

## 2025-03-17 DIAGNOSIS — N93.9 VAGINAL BLEEDING: Primary | ICD-10-CM

## 2025-03-17 DIAGNOSIS — R31.9 URINARY TRACT INFECTION WITH HEMATURIA, SITE UNSPECIFIED: ICD-10-CM

## 2025-03-17 DIAGNOSIS — K64.4 EXTERNAL HEMORRHOIDS: ICD-10-CM

## 2025-03-17 DIAGNOSIS — Z12.4 PAP SMEAR FOR CERVICAL CANCER SCREENING: ICD-10-CM

## 2025-03-17 LAB
ALBUMIN UR-MCNC: NEGATIVE MG/DL
APPEARANCE UR: CLEAR
BACTERIA #/AREA URNS HPF: ABNORMAL /HPF
BILIRUB UR QL STRIP: NEGATIVE
CLUE CELLS: ABNORMAL
COLOR UR AUTO: YELLOW
GLUCOSE UR STRIP-MCNC: NEGATIVE MG/DL
HGB UR QL STRIP: ABNORMAL
KETONES UR STRIP-MCNC: NEGATIVE MG/DL
LEUKOCYTE ESTERASE UR QL STRIP: NEGATIVE
MUCOUS THREADS #/AREA URNS LPF: PRESENT /LPF
NITRATE UR QL: NEGATIVE
PH UR STRIP: 6.5 [PH] (ref 5–7)
RBC #/AREA URNS AUTO: ABNORMAL /HPF
SP GR UR STRIP: 1.01 (ref 1–1.03)
SQUAMOUS #/AREA URNS AUTO: ABNORMAL /LPF
TRICHOMONAS, WET PREP: ABNORMAL
UROBILINOGEN UR STRIP-ACNC: 0.2 E.U./DL
WBC #/AREA URNS AUTO: ABNORMAL /HPF
WBC'S/HIGH POWER FIELD, WET PREP: ABNORMAL
YEAST, WET PREP: ABNORMAL

## 2025-03-17 PROCEDURE — 3075F SYST BP GE 130 - 139MM HG: CPT

## 2025-03-17 PROCEDURE — 81001 URINALYSIS AUTO W/SCOPE: CPT

## 2025-03-17 PROCEDURE — 87210 SMEAR WET MOUNT SALINE/INK: CPT

## 2025-03-17 PROCEDURE — 99214 OFFICE O/P EST MOD 30 MIN: CPT

## 2025-03-17 PROCEDURE — 3078F DIAST BP <80 MM HG: CPT

## 2025-03-17 NOTE — PROGRESS NOTES
"  Assessment & Plan     Vaginal bleeding  normal female external genitalia, normal urethral meatus , normal vaginal mucosa, vaginal discharge - bloody, normal cervix, adnexae, and uterine prolapse noted one inch from vaginal entrance, patient reports that 1.5 weeks ago she had bleeding when she wiped and was unsure if it was from her urethra or from her rectum  - Ob/Gyn  Referral; Future  - US Pelvic Complete with Transvaginal  - Gynecologic Cytology (PAP Smear)    Pap smear for cervical cancer screening  Per patient request  - Gynecologic Cytology (PAP Smear)    Trichomonal cervicitis  - Wet prep - Clinic Collect    Urinary tract infection with hematuria, site unspecified  Pt has had persistent UTI's over the last 6 months with pain  - UA with Microscopic reflex to Culture - Clinic Collect  - UA Microscopic with Reflex to Culture    External hemorrhoids   normal sphincter tone, no rectal masses, sphincter tone normal, no masses, and external hemorrhoids noted from 1:00- 7:00  that are deflated, noninflamed nor irritated       BMI  Estimated body mass index is 25.28 kg/m  as calculated from the following:    Height as of this encounter: 1.562 m (5' 1.5\").    Weight as of this encounter: 61.7 kg (136 lb).             Anthony Perry is a 89 year old, presenting for the following health issues:  Follow Up and Urgent Care      3/17/2025    10:15 AM   Additional Questions   Roomed by ELMER Bazzi LPN   Accompanied by self         3/17/2025    10:15 AM   Patient Reported Additional Medications   Patient reports taking the following new medications none     History of Present Illness       Reason for visit:  Urgent care follow up.    She eats 2-3 servings of fruits and vegetables daily.She consumes 0 sweetened beverage(s) daily.She exercises with enough effort to increase her heart rate 9 or less minutes per day.  She exercises with enough effort to increase her heart rate 3 or less days per week.     Patient " "was seen on 9/20/2024 for UTI and had symptoms of urinary urgency, and increased urinary frequency and was treated with Bactrim.  Patient was seen again on 1/28/2025 for urinary frequency and urgency x 3 days and was treated with cephalexin 2 times a day x 7 days and changed to Macrobid twice daily x 5 days due to the culture and sensitivity results.  Patient was seen again on 2/10/2025 for dysuria and urinary urgency/frequency and pain across the entire lower back.  The pain returned 3 to 4 days after the completion of her Macrobid.  Patient had a CT abdomen pelvis with contrast on 2/11/2025 which did not show significant mass, stones or hydronephrosis.  There were simple or benign cysts noted that did not indicate any follow-up.  There was incidental findings for calcified gallstones.  Low-grade pyelonephritis/upper tract infection was considered.  Patient was prescribed a trial of Aleve for a few days twice daily with food.    Pt reports that her back pain has resolved on its own and didn't take the Alleve. Pt reports that she had an episode 1.5 weeks ago of blood when she wipes.  Patient is unsure if the blood came from her urethra or from her rectum.  Prior to that time she was constipated. Pt states that since then she has taken Miralax and now is no longer constipated. Pt has not had another episode of bleeding since 1.5 weeks ago.  Patient denies any dysuria, no urinary frequency or incontinence out of the ordinary.           Review of Systems  Constitutional, neuro, ENT, endocrine, pulmonary, cardiac, gastrointestinal, genitourinary, musculoskeletal, integument and psychiatric systems are negative, except as otherwise noted.      Objective    Pulse 65   Temp 97.4  F (36.3  C) (Oral)   Resp 16   Ht 1.562 m (5' 1.5\")   Wt 61.7 kg (136 lb)   LMP  (LMP Unknown)   SpO2 97%   Breastfeeding No   BMI 25.28 kg/m    Body mass index is 25.28 kg/m .  Physical Exam   GENERAL: alert and no distress  EYES: Eyes " grossly normal to inspection, PERRL and conjunctivae and sclerae normal  HENT: ear canals and TM's normal, nose and mouth without ulcers or lesions  NECK: no adenopathy, no asymmetry, masses, or scars  RESP: lungs clear to auscultation - no rales, rhonchi or wheezes  CV: regular rate and rhythm, normal S1 S2, no S3 or S4, no murmur, click or rub, no peripheral edema  ABDOMEN: soft, nontender, without hepatosplenomegaly or masses and bowel sounds normal   (female): normal female external genitalia, normal urethral meatus , normal vaginal mucosa, vaginal discharge - bloody, normal cervix, adnexae, and uterine prolapse noted one inch from vaginal entrance  RECTAL (female): normal sphincter tone, no rectal masses, sphincter tone normal, no masses, and external hemorrhoids noted from 1:00- 7:00  that are deflated and noninflamed nor irritated   MS: no gross musculoskeletal defects noted, no edema  SKIN: no suspicious lesions or rashes  NEURO: Normal strength and tone, mentation intact and speech normal  BACK: no CVA tenderness, no paralumbar tenderness  PSYCH: mentation appears normal, affect normal/bright            Signed Electronically by: ODETTE Sanchez CNP

## 2025-03-17 NOTE — PATIENT INSTRUCTIONS
Genitourinary syndrome of menopause (GSM, previously referred to as vaginal atrophy, vulvovaginal atrophy, urogenital atrophy, or atrophic vaginitis) is defined as a collection of symptoms and signs caused by hypoestrogenic changes to the labia majora/minora, clitoris, vestibule/introitus, vagina, urethra, and bladder that occur in menopausal patients. The syndrome may include, but is not limited to, genital symptoms of dryness, burning, and irritation; sexual symptoms of lack of lubrication, discomfort or pain, and impaired function; and urinary symptoms of urgency, dysuria, and recurrent urinary tract infections. Patients may present with some or all of the signs and symptoms, which must be bothersome and should not be better accounted for by another diagnosis. The spectrum of adverse consequences requires long-term treatment in many patients. Treatment options include both hormonal and nonhormonal interventions.  Although vaginal atrophy typically occurs in menopausal patients, it can occur in females of any age who experience a decrease in estrogenic stimulation of the urogenital tissues. In premenopausal patients, a hypoestrogenic state may occur during the postpartum period or lactation or due to hypothalamic amenorrhea or antiestrogenic drugs. Not all patients with atrophic changes on examination are symptomatic.  Clinical manifestations and diagnosis of GSM are reviewed here. Treatment of symptomatic vaginal atrophy, as well as use of estrogen and other pharmacologic therapies for other menopausal symptoms, is discussed in detail separately.

## 2025-03-18 ENCOUNTER — TELEPHONE (OUTPATIENT)
Dept: OBGYN | Facility: CLINIC | Age: OVER 89
End: 2025-03-18
Payer: COMMERCIAL

## 2025-03-18 NOTE — TELEPHONE ENCOUNTER
This encounter is being sent to inform the clinic that this patient has a referral from Francoise Aguilar APRN CNP for the diagnoses of Vaginal bleeding [N93.9] and has requested that this patient be seen within 1-2 days.  Based on the availability of our provider(s), we are unable to accommodate this request.    Were all sites offered this patient?  Yes    Does scheduling algorithm request to schedule next available?  Patient has been scheduled for the first available opening with Dr Meyers on 4/23/25.  We have informed the patient that the clinic will review their referral and reach out if a sooner appointment is medically necessary.

## 2025-03-21 ENCOUNTER — ANCILLARY PROCEDURE (OUTPATIENT)
Dept: ULTRASOUND IMAGING | Facility: CLINIC | Age: OVER 89
End: 2025-03-21
Payer: COMMERCIAL

## 2025-03-21 PROCEDURE — 76856 US EXAM PELVIC COMPLETE: CPT | Performed by: OBSTETRICS & GYNECOLOGY

## 2025-03-21 PROCEDURE — 76830 TRANSVAGINAL US NON-OB: CPT | Performed by: OBSTETRICS & GYNECOLOGY

## 2025-03-24 ENCOUNTER — OFFICE VISIT (OUTPATIENT)
Dept: OBGYN | Facility: CLINIC | Age: OVER 89
End: 2025-03-24
Payer: COMMERCIAL

## 2025-03-24 VITALS — SYSTOLIC BLOOD PRESSURE: 164 MMHG | DIASTOLIC BLOOD PRESSURE: 62 MMHG | WEIGHT: 136 LBS | BODY MASS INDEX: 25.28 KG/M2

## 2025-03-24 DIAGNOSIS — N93.9 VAGINAL BLEEDING: ICD-10-CM

## 2025-03-24 PROCEDURE — 3077F SYST BP >= 140 MM HG: CPT | Performed by: OBSTETRICS & GYNECOLOGY

## 2025-03-24 PROCEDURE — 58100 BIOPSY OF UTERUS LINING: CPT | Performed by: OBSTETRICS & GYNECOLOGY

## 2025-03-24 PROCEDURE — 3078F DIAST BP <80 MM HG: CPT | Performed by: OBSTETRICS & GYNECOLOGY

## 2025-03-24 PROCEDURE — 99203 OFFICE O/P NEW LOW 30 MIN: CPT | Mod: 25 | Performed by: OBSTETRICS & GYNECOLOGY

## 2025-03-24 NOTE — PROGRESS NOTES
I spent a total of 25 minutes in the care of Vicky on the day of service including 20 minutes of face-to-face time with remainder in chart review, care coordination, documentation on the day of service.  In addition to that time additional time was spent doing an endometrial sample and consenting the patient for that.  That total time was approximately 5 minutes.    Patient is a 89-year-old P2 who presents with postmenopausal bleeding.  She initially had a UTI that noted to have blood in it.  And the suggestion was to have a CT scan looking for a stone.  She did have that CT scan which then demonstrated a uterus with a thickened endometrial lining.  Following that pelvic ultrasound was done which showed the lining to be approximately 11 mm in thickness.    She and I talked about this and though the bleeding was vague it seemed to be clearly coming vaginally rather than rectally.  Additionally is a 89-year-old she is very active and articulate.  She is a [place] descent.  And delightful personality.  We talked about the uterine anatomy.  And the reason for possibly doing an endometrial biopsy.  I diagrammed the uterus and explained where the bleeding typically comes from additionally talked about the pathology that could be involved and the rational for doing endometrial sample but then also the rationale for doing an endometrial D&C.  She understood and asked appropriate questions.    Examination:  She is pleasant appropriate no apparent distress  See added details in the procedure note below.  Of note she has normal external genitalia for age normal vaginal Koza and the cervix did have a strand of mucousy blood emanating from it.    Assessment postmenopausal in a very active 89-year-old    Plan:  Endometrial sample is pending and pending those path results suggest that a D&C would be appropriate      INDICATIONS:                                                    Is a pregnancy test required: No.  Was a  consent obtained?  Yes    Having endometrial biopsy for post-menopausal bleeding    Today's PHQ-2 Score:       3/24/2025    11:05 AM   PHQ-2 ( 1999 [employer])   Q1: Little interest or pleasure in doing things 0   Q2: Feeling down, depressed or hopeless 0   PHQ-2 Score 0       PROCEDURE;                                                      A speculum was placed in the vagina and cervix prepped with betadine. A tenaculum was attached to the cervix. A small plastic 5 mm Pipelle syringe curette was inserted into the cervical canal. The uterus was sounded to 6 cm's. A vigorous four quadrant biopsy was performed, removing amount moderate  of tissue. The speculum was removed. This tissue was placed in Formalin and sent to pathology.    The patient tolerated the procedure  well and she reported there was no cramping.      POST PROCEDURE;                                                      There  was no cramping at the time of discharge. She  tolerated the procedure well. There were no complications. Patient was discharged in stable condition.    Patient advised to call the clinic if severe pelvic pain, fever or heavy bleeding.    Tomer Garcia MD

## 2025-03-24 NOTE — NURSING NOTE
"Chief Complaint   Patient presents with    Vaginal Bleeding       Initial BP (!) 164/62   Wt 61.7 kg (136 lb)   LMP  (LMP Unknown)   BMI 25.28 kg/m   Estimated body mass index is 25.28 kg/m  as calculated from the following:    Height as of 3/17/25: 1.562 m (5' 1.5\").    Weight as of this encounter: 61.7 kg (136 lb).  BP completed using cuff size: regular    Questioned patient about current smoking habits.  Pt. has never smoked.      No obstetric history on file.    The following HM Due: NONE    [place] LPN on 3/24/2025 at 10:47 AM             "

## 2025-03-31 ENCOUNTER — TELEPHONE (OUTPATIENT)
Dept: OBGYN | Facility: CLINIC | Age: OVER 89
End: 2025-03-31
Payer: COMMERCIAL

## 2025-03-31 NOTE — TELEPHONE ENCOUNTER
Pt calls requesting pathology results.   Please advise when you are able.    JANNETTE ThomasonN  [facility]

## 2025-03-31 NOTE — TELEPHONE ENCOUNTER
[facility]    Phone Message    May a detailed message be left on voicemail: yes     Reason for Call: Requesting Results     Name/type of test: Recent Lab results  Date of test: 3/24/25  Was test done at a location other than [facility] (Please fill in the location if not [facility])?: No    Action Taken: Other: HUGO LOMELIGYERIC     Travel Screening: Not Applicable     Date of Service:

## 2025-04-01 ENCOUNTER — TELEPHONE (OUTPATIENT)
Dept: OBGYN | Facility: CLINIC | Age: OVER 89
End: 2025-04-01
Payer: COMMERCIAL

## 2025-04-01 ENCOUNTER — PATIENT OUTREACH (OUTPATIENT)
Dept: ONCOLOGY | Facility: CLINIC | Age: OVER 89
End: 2025-04-01
Payer: COMMERCIAL

## 2025-04-01 NOTE — TELEPHONE ENCOUNTER
Call to pt.     Advised that she should hear from provider today.     Dr Garcia's MA aware that pt is waiting to hear about results. Will consult with Dr Garcia once he is back in clinic today.    Nargis YAP RN  OB/GYN [place]

## 2025-04-01 NOTE — PROGRESS NOTES
"New Patient Hematology / Oncology Nurse Navigator Note     Referral Date: 4/1/25    Referring provider:   Tomer Garcia MD   [address] [address]   Phone: [phone]   Fax: [phone]       Referring Clinic/Organization: [facility]     Referred to: GynOnc    Requested provider (if applicable): First available - did not specify     Evaluation for : \"Pt is a healthy 89 year old with newly Dx uterine cancer.\"     Clinical History (per Nurse review of records provided):    3/24/25 Path:  Final Diagnosis   A.  Endometrium, biopsy:  -SEROUS CARCINOMA, p53 mutant.  -Additional ancillary testing:       -MMR IHC:  Intact       -HER2 IHC:  Negative  -See Gynecologic Biomarker Reporting Template    3/17/25 PAP -- BOOKMARKED  3/21/25 Pelvic US:  Complete pelvic ultrasound using realtime   transabdominal and transvaginal scanning.  Bladder appears normal.  Normal uterus  Normal right ovary, with small sub 1 cm simple cyst.  Prominent and irregular endometrial lining  No free fluid in the cul de sac  Thickened endometrial stripe in a postmenopausal woman: recommend endometrial sampling. -- BOOKMARKED  3/24/25 Office Visit with OBGYN -- BOOKMARKED    Clinical Assessment / Barriers to Care (Per Nurse):  Pt lives in [place], [place]. Per referral notes, \"Vicky's daughter lives in [place]. This sounds like the preferred place to go if she has options \"    Records Location: [facility]     Records Needed:   N/A    Additional testing needed prior to consult:   N/A    Referral updates and Plan:   OUTGOING CALL to pt:  Introduced my role as nurse navigator with [facility] Hematology/Oncology dept and that we have recd the referral to GynOnc from Dr Garcia.  Pt confirms they are aware of the referral and ready to schedule. She prefers  location.   Provided contact information if future questions arise.     -Transferred pt to NPS line [phone] to schedule appt per scheduling " instructions.      Shelia Moyer, BSN, RN, PHN, OCN  Hematology/Oncology Nurse Navigator  [facility]  [phone]

## 2025-04-01 NOTE — TELEPHONE ENCOUNTER
RECORDS STATUS - ALL OTHER DIAGNOSIS      RECORDS RECEIVED FROM: [facility]   NOTES STATUS DETAILS   OFFICE NOTE from referring provider Epic 3/24/2025 - Dr. Tomer Garcia   MEDICATION LIST [facility]    LABS     PATHOLOGY REPORTS [facility] 3/24/2025 - [id number]    ANYTHING RELATED TO DIAGNOSIS Epic 3/17/2025   IMAGING (NEED IMAGES & REPORT)     CT SCANS PACS CT Abdomen Pelvis: 2/11/2025   ULTRASOUND PACS US Pelvis: 3/21/2025

## 2025-04-01 NOTE — TELEPHONE ENCOUNTER
[facility] Call Center    Phone Message    May a detailed message be left on voicemail: yes     Reason for Call: Other: Patient is calling stating she had a biopsy done on 3/34 and she seen the results that she has cancer and the note on mychart per patient said the provider would contact her but she hasn't  heard anything, she is hoping Dr. Garcia can call back the patient. Thank you     Action Taken: Other: obgyn    Travel Screening: Not Applicable     Date of Service:

## 2025-04-10 ENCOUNTER — PATIENT OUTREACH (OUTPATIENT)
Dept: ONCOLOGY | Facility: CLINIC | Age: OVER 89
End: 2025-04-10

## 2025-04-10 ENCOUNTER — PRE VISIT (OUTPATIENT)
Dept: ONCOLOGY | Facility: CLINIC | Age: OVER 89
End: 2025-04-10
Payer: COMMERCIAL

## 2025-04-10 ENCOUNTER — ONCOLOGY VISIT (OUTPATIENT)
Dept: ONCOLOGY | Facility: CLINIC | Age: OVER 89
End: 2025-04-10
Attending: OBSTETRICS & GYNECOLOGY
Payer: COMMERCIAL

## 2025-04-10 VITALS — HEART RATE: 66 BPM | DIASTOLIC BLOOD PRESSURE: 67 MMHG | SYSTOLIC BLOOD PRESSURE: 181 MMHG | OXYGEN SATURATION: 94 %

## 2025-04-10 DIAGNOSIS — N93.9 VAGINAL BLEEDING: ICD-10-CM

## 2025-04-10 DIAGNOSIS — C55 MALIGNANT NEOPLASM OF UTERUS, UNSPECIFIED SITE (H): ICD-10-CM

## 2025-04-10 LAB
ALBUMIN SERPL BCG-MCNC: 4.5 G/DL (ref 3.5–5.2)
ALP SERPL-CCNC: 87 U/L (ref 40–150)
ALT SERPL W P-5'-P-CCNC: 15 U/L (ref 0–50)
ANION GAP SERPL CALCULATED.3IONS-SCNC: 13 MMOL/L (ref 7–15)
AST SERPL W P-5'-P-CCNC: 18 U/L (ref 0–45)
BASOPHILS # BLD AUTO: 0.1 10E3/UL (ref 0–0.2)
BASOPHILS NFR BLD AUTO: 1 %
BILIRUB SERPL-MCNC: 0.4 MG/DL
BUN SERPL-MCNC: 11.7 MG/DL (ref 8–23)
CALCIUM SERPL-MCNC: 10.1 MG/DL (ref 8.8–10.4)
CHLORIDE SERPL-SCNC: 103 MMOL/L (ref 98–107)
CREAT SERPL-MCNC: 0.61 MG/DL (ref 0.51–0.95)
EGFRCR SERPLBLD CKD-EPI 2021: 84 ML/MIN/1.73M2
EOSINOPHIL # BLD AUTO: 0.2 10E3/UL (ref 0–0.7)
EOSINOPHIL NFR BLD AUTO: 2 %
ERYTHROCYTE [DISTWIDTH] IN BLOOD BY AUTOMATED COUNT: 12 % (ref 10–15)
GLUCOSE SERPL-MCNC: 112 MG/DL (ref 70–99)
HCO3 SERPL-SCNC: 28 MMOL/L (ref 22–29)
HCT VFR BLD AUTO: 44.2 % (ref 35–47)
HGB BLD-MCNC: 15 G/DL (ref 11.7–15.7)
IMM GRANULOCYTES # BLD: 0 10E3/UL
IMM GRANULOCYTES NFR BLD: 1 %
LYMPHOCYTES # BLD AUTO: 1.9 10E3/UL (ref 0.8–5.3)
LYMPHOCYTES NFR BLD AUTO: 23 %
MCH RBC QN AUTO: 31.8 PG (ref 26.5–33)
MCHC RBC AUTO-ENTMCNC: 33.9 G/DL (ref 31.5–36.5)
MCV RBC AUTO: 94 FL (ref 78–100)
MONOCYTES # BLD AUTO: 0.7 10E3/UL (ref 0–1.3)
MONOCYTES NFR BLD AUTO: 8 %
NEUTROPHILS # BLD AUTO: 5.2 10E3/UL (ref 1.6–8.3)
NEUTROPHILS NFR BLD AUTO: 65 %
NRBC # BLD AUTO: 0 10E3/UL
NRBC BLD AUTO-RTO: 0 /100
PLATELET # BLD AUTO: 324 10E3/UL (ref 150–450)
POTASSIUM SERPL-SCNC: 3.9 MMOL/L (ref 3.4–5.3)
PROT SERPL-MCNC: 7.5 G/DL (ref 6.4–8.3)
RBC # BLD AUTO: 4.72 10E6/UL (ref 3.8–5.2)
SODIUM SERPL-SCNC: 144 MMOL/L (ref 135–145)
WBC # BLD AUTO: 8 10E3/UL (ref 4–11)

## 2025-04-10 PROCEDURE — 85018 HEMOGLOBIN: CPT | Performed by: OBSTETRICS & GYNECOLOGY

## 2025-04-10 PROCEDURE — 85004 AUTOMATED DIFF WBC COUNT: CPT | Performed by: OBSTETRICS & GYNECOLOGY

## 2025-04-10 PROCEDURE — 80053 COMPREHEN METABOLIC PANEL: CPT | Performed by: OBSTETRICS & GYNECOLOGY

## 2025-04-10 RX ORDER — METRONIDAZOLE 500 MG/100ML
500 INJECTION, SOLUTION INTRAVENOUS
OUTPATIENT
Start: 2025-04-10

## 2025-04-10 RX ORDER — HEPARIN SODIUM 10000 [USP'U]/ML
5000 INJECTION, SOLUTION INTRAVENOUS; SUBCUTANEOUS
OUTPATIENT
Start: 2025-04-10

## 2025-04-10 RX ORDER — PHENAZOPYRIDINE HYDROCHLORIDE 100 MG/1
200 TABLET, FILM COATED ORAL ONCE
OUTPATIENT
Start: 2025-04-10 | End: 2025-04-10

## 2025-04-10 ASSESSMENT — PAIN SCALES - GENERAL: PAINLEVEL_OUTOF10: NO PAIN (0)

## 2025-04-10 NOTE — PATIENT INSTRUCTIONS
Labs and EKG today.  Results will be posted to [employer]  You will be receiving a telephone call from our scheduling team soon, to schedule your surgery with Dr. Yoder.  Follow up with Dr. Yoder around 2 weeks after surgery.    Please call us with any questions or concerns following your appointment with us today. Thank you for choosing [facility].    [facility] - Contact Information    Clinic Hours: Monday - Friday, 8:00 AM to 4:30 PM    Appointment Scheduling [phone] (option #4)   Triage Nurses (for symptom/side effect concerns, or urgent needs) [phone]   After-Hours Advice Line [phone]   Dr. Yoder's Nurse, Agustin [phone]   Surgery Scheduling [phone]   Fax Number [phone]

## 2025-04-10 NOTE — PROGRESS NOTES
"[facility]: Cancer Care Initial Note                                    Situation:                                                      RN met with patient and daughter, Giuliana, in clinic this afternoon, following patient's consult appointment with Dr. Yoder.  Reviewed Dr. Yoder's recommendation for surgery.  Provided surgery folder, handouts, education, contact information for our clinic, and bottle of Hibiclens soap.    Patient signed e-consents for surgery and possible blood transfusion while in clinic today.      Assessment:                                                      Pre/Post Procedure:   Surgery/Procedure plan reviewed with patient  Planned Surgery or Procedure: Laparoscopy, removal of uterus, fallopian tubes, ovaries, cervix, lymph nodes.  Possible open surgery.  Psssible cancer operation.  Look into bladder (cystoscopy).  Anesthesia Type: general anesthesia  Relevant Diagnosis: Malignant neoplasm of uterus, unspecified site  Preoperative Surgical Consult Date: 04/10/25  Pre-Op Physical to be completed by (e.g.: PCP, Pre-Assessment Center, etc.):: Surgeon  Post-op Appointment Date:  (TBD - appointment will be scheduled once patient's surgery date has been confirmed.)     Education  Person Taught: patient, family member/friend  Learning Readiness and Ability: no barriers identified  Pre-op Care Instructions: proper use of medications, when to take, and when to hold, when to call provider  Pre-op Infection Prevention Reviewed: pre-op CHG bathing instructions, bathing care after procedure  Pre-op Planning Reviewed: how to get to procedure location,  arrangements, if indicated, post-op support plan (\"who will help care for you after your surgery/procedure?\")  Pre-op Education/Instructions provided: how to prepare for surgery, what to expect on surgery day, surgery location specifics (map, parking, phone number), showering before surgery, eating before and after surgery  Education " Outcome Evaluation: acceptance expressed    Pre-op Checklist Reviewed  Labs: completed  Chest X-Ray: n/a  EKG: completed  Urinalysis: n/a  Anticoagulation plan: n/a  Bowel Prep: n/a     Plan:                                                       Patient was informed that our scheduling team will be calling her soon, to schedule her surgery and post-op appointment with Dr. Yoder.  Patient verbalized understanding, and stated agreement with plan.    Encouraged patient to reach out with any questions or concerns following today's visit.    Agustin Hernandez, RN, BSN, OCN  RN Care Coordinator - Oncology  [facility]

## 2025-04-10 NOTE — LETTER
"4/10/2025      Vicky Kang  [address]  [facility] [address]      Dear Colleague,    Thank you for referring your patient, Vicky Kang, to the [facility]. Please see a copy of my visit note below.                            Consult Notes on Referred Patient    Date: 4/10/2025       [facility] Tomer Garcia MD  [address]  [place] [address]       RE: Vicky Kang  : 1935  LORENE: 4/10/2025    Dear Dr. Tomer Garcia:    I had the pleasure of seeing your patient Vicky Kang here at the Gynecologic Cancer Clinic at the [facility] on 4/10/2025.  As you know she is a very pleasant 89 year old woman with a recent diagnosis of  serous uterine cancer.  Given these findings she was subsequently sent to the Gynecologic Cancer Clinic for new patient consultation.     3/24/25 Saw Dr. Corby Garcia, who reported the following:    \"Patient is a 89-year-old P2 who presents with postmenopausal bleeding.  She initially had a UTI that noted to have blood in it.  And the suggestion was to have a CT scan looking for a stone.  She did have that CT scan which then demonstrated a uterus with a thickened endometrial lining.  Following that pelvic ultrasound was done which showed the lining to be approximately 11 mm in thickness.     She and I talked about this and though the bleeding was vague it seemed to be clearly coming vaginally rather than rectally.  Additionally is a 89-year-old she is very active and articulate.  She is a [place] descent.  And delightful personality.  We talked about the uterine anatomy.  And the reason for possibly doing an endometrial biopsy.  I diagrammed the uterus and explained where the bleeding typically comes from additionally talked about the pathology that could be involved and the rational for doing endometrial sample but then also the rationale for doing an endometrial D&C.  She understood and asked " "appropriate questions.\"    Dr. Jose johnson performed and endometrial biopsy which demonstrated the following:    Final Diagnosis   A.  Endometrium, biopsy:  -SEROUS CARCINOMA, p53 mutant.  -Additional ancillary testing:       -MMR IHC:  Intact       -HER2 IHC:  Negative  -See Gynecologic Biomarker Reporting Template       Electronically signed by Renay Martines MD on 3/28/2025 at 11:51 AM   Synoptic Checklist   Gynecologic Biomarker Reporting Template   Protocol posted: 3/22/2023GYNECOLOGIC BIOMARKER REPORTING TEMPLATE - A  TEST(S) PERFORMED   Testing Performed on Block Number(s)  A1   Specimen Type  Biopsy / curettage   Appropriate Controls Verified  Yes        Estrogen Receptor (ER) Status  Positive   Percentage of Cells with Nuclear Positivity  95 %   Average Intensity of Staining  Moderate        Progesterone Receptor (PgR) Status  Negative (less than 1%)        HER2 Status  Negative (score 0)        Mismatch Repair (MMR) Protein Status     Nuclear MLH1 Expression  Intact   Mismatch Repair (MMR) Protein Status     Nuclear PMS2 Expression  Intact   Mismatch Repair (MMR) Protein Status     Nuclear MSH2 Expression  Intact   Mismatch Repair (MMR) Protein Status     Nuclear MSH6 Expression  Intact   Immunohistochemistry (IHC) Interpretation for Mismatch Repair (MMR) Proteins  No loss of nuclear expression of MMR proteins: low probability of microsatellite instability-high (MSI-H) phenotype        p53 Status  Abnormal expression (mutated)     Overexpression (strong, diffuse nuclear expression in greater than 90% of cells)          2/11/25 CT  IMPRESSION:   1.  No acute findings or other explanation for symptoms.  2.  Cholelithiasis.            Review of Systems:    Systemic           no weight changes; no fever; no chills; no night sweats; no appetite changes  Skin           no rashes, or lesions  Eye           no irritation; no changes in vision  Hao-Laryngeal           no dysphagia; no hoarseness   Pulmonary    no " cough; no shortness of breath  Cardiovascular    no chest pain; no palpitations  Gastrointestinal    no diarrhea; no constipation; no abdominal pain; no changes in bowel  habits; no blood in stool  Genitourinary   no urinary frequency; no urinary urgency; no dysuria; no pain; no abnormal vaginal discharge; no abnormal vaginal bleeding  Breast   no breast discharge; no breast changes; no breast pain  Musculoskeletal    no myalgias; no arthralgias; no back pain  Psychiatric           no depressed mood; no anxiety    Hematologic           no tender lymph nodes; no noticeable swellings or lumps   Endocrine    no hot flashes; no heat/cold intolerance         Neurological   no tremor; no numbness and tingling; no headaches; no difficulty  sleeping      Past Medical History:    Past Medical History:   Diagnosis Date     Abnormal finding on thyroid function test 11/10/2015     Hyperlipidemia      Hypertension      Neuropathy      Subclinical hypothyroidism 11/10/2015     Thyroid nodule 11/10/2015         Past Surgical History:  Past Surgical History:   Procedure Laterality Date     BREAST EXCISIONAL BIOPSY       CATARACT IOL, RT/LT       ENT SURGERY   Spring 2017    throat cyst removal     PHACOEMULSIFICATION CLEAR CORNEA WITH STANDARD INTRAOCULAR LENS IMPLANT  01/05/2012    Procedure:PHACOEMULSIFICATION CLEAR CORNEA WITH STANDARD INTRAOCULAR LENS IMPLANT; LEFT PHACOEMULSIFICATION CLEAR CORNEA WITH STANDARD LENS IMPLANT ; Surgeon:TUNDE GARDNER; Location: [facility]     VITRECTOMY ANTERIOR       vocal cord sugery               Health Maintenance:  Health Maintenance Due   Topic Date Due     RSV VACCINE (1 - 1-dose 75+ series) Never done     DTAP/TDAP/TD IMMUNIZATION (2 - Td or Tdap) 02/24/2023     COVID-19 Vaccine (8 - 2024-25 season) 03/16/2025       Last Pap Smear: remote    Last Mammogram: 10/2024 birads 1    Last Colonoscopy: 2013 polyps    Current Medications:     has a current medication list which includes the  following prescription(s): amlodipine, aspirin, losartan, metoprolol succinate er, multiple vitamins-minerals, omeprazole, and pravastatin, and the following Facility-Administered Medications: sodium chloride (pf).       Allergies:     Seasonal allergies        Social History:     Social History     Tobacco Use     Smoking status: Never     Passive exposure: Never     Smokeless tobacco: Never   Substance Use Topics     Alcohol use: No     Alcohol/week: 0.0 standard drinks of alcohol       History   Drug Use No           Family History:     The patient's family history is notable for .    Family History   Problem Relation Age of Onset     Cancer Mother         unknown - had radiation implant (presumed uterine cancer)     Family History Negative Father      Breast Cancer No family hx of      Colon Cancer No family hx of      Ovarian Cancer No family hx of          Physical Exam:     PS 0  VS: LMP  (LMP Unknown)      General Appearance: healthy and alert, no distress; remarkably spry appearing     HEENT:  no thyromegaly, no palpable nodules or masses        Cardiovascular: regular rate and rhythm, no gallops, rubs or murmurs     Respiratory: lungs clear, no rales, rhonchi or wheezes, normal diaphragmatic excursion    Musculoskeletal: extremities non tender and without edema    Skin: no lesions or rashes     Neurological: normal gait, no gross defects     Psychiatric: appropriate mood and affect                               Hematological: normal cervical, supraclavicular and inguinal lymph nodes     Gastrointestinal:       abdomen soft, non-tender, non-distended, no organomegaly or masses    Genitourinary: External genitalia and urethral meatus appears normal.  Vagina is smooth without nodularity or masses.  Cervix appears normal and without lesions.  Bimanual exam reveal no masses, nodularity or fullness.  Recto-vaginal exam confirms these findings.      Assessment:    Vicky Kang is a 89 year old woman with a new  diagnosis of serous uterine cancer.     A total of +60 minutes was spent with the patient, 40 minutes of which were spent in counseling the patient and/or treatment planning.      Plan:     1.)   EMCA  -  we have discussed the pathologic an clinical findings and she is aware of the potential for malignancy that is worse than her current diagnosis.  We have discussed options including medical and surgical management. After a comprehensive discussion of the relative risks and benefits of each strategy she is inclined to surgery, which is reasonable.  She undertands that adjuvant treatment will likely me required after surgery in the form of chemotherapy, RT, or both.  Given the higher grade nature of the tumor, staging will be indicated in my estimation, and she understands that this could require laparotomy, especially if there is overt evidence of metastases.    WE have discussed the relative pros and cons of post-operative therapy in her age group, but will defer a decision on this until after surgery       2.) Genetic risk factors were assessed and the patient does not meet the qualifications for a referral.      3.) Labs and/or tests ordered include:  pre-op labs, ecg.     4.) Health maintenance issues addressed today include none.    5.) Pre-op teaching was completed today.  Risks of surgery were discussed to include: bleeding, transfusion, infection, unintentional injury to surrounding organs/structures.      Thank you for allowing us to participate in the care of your patient.         Sincerely,    Ramiro Yoder MD      CC  Patient Care Team:  Leatha Conroy MD as PCP - General (Internal Medicine)  Leatha Conroy MD as Assigned PCP  Ángel Meyers MD as MD (OB/Gyn)  Francoise Aguilar APRN CNP as Nurse Practitioner (Internal Medicine)  Ramiro Yoder MD as MD (Gynecologic Oncology)  FADY CRABTREE        Again, thank you for allowing me to  participate in the care of your patient.        Sincerely,        Ramiro Yoder MD    Electronically signed

## 2025-04-10 NOTE — PROGRESS NOTES
"                        Consult Notes on Referred Patient    Date: 4/10/2025       Dr. Tomer Garcia MD  [address] [address]       RE: Vicky Kang  : 1935  LORENE: 4/10/2025    Dear Dr. Tomer Garcia:    I had the pleasure of seeing your patient Vicky Kang here at the Gynecologic Cancer Clinic at the [facility] on 4/10/2025.  As you know she is a very pleasant 89 year old woman with a recent diagnosis of  serous uterine cancer.  Given these findings she was subsequently sent to the Gynecologic Cancer Clinic for new patient consultation.     3/24/25 Saw Dr. Corby Garcia, who reported the following:    \"Patient is a 89-year-old P2 who presents with postmenopausal bleeding.  She initially had a UTI that noted to have blood in it.  And the suggestion was to have a CT scan looking for a stone.  She did have that CT scan which then demonstrated a uterus with a thickened endometrial lining.  Following that pelvic ultrasound was done which showed the lining to be approximately 11 mm in thickness.     She and I talked about this and though the bleeding was vague it seemed to be clearly coming vaginally rather than rectally.  Additionally is a 89-year-old she is very active and articulate.  She is a [place] descent.  And delightful personality.  We talked about the uterine anatomy.  And the reason for possibly doing an endometrial biopsy.  I diagrammed the uterus and explained where the bleeding typically comes from additionally talked about the pathology that could be involved and the rational for doing endometrial sample but then also the rationale for doing an endometrial D&C.  She understood and asked appropriate questions.\"    Dr. Garcia astutely performed and endometrial biopsy which demonstrated the following:    Final Diagnosis   A.  Endometrium, biopsy:  -SEROUS CARCINOMA, p53 mutant.  -Additional ancillary testing:       -MMR IHC:  Intact       -HER2 " IHC:  Negative  -See Gynecologic Biomarker Reporting Template       Electronically signed by Renay Martines MD on 3/28/2025 at 11:51 AM   Synoptic Checklist   Gynecologic Biomarker Reporting Template   Protocol posted: 3/22/2023GYNECOLOGIC BIOMARKER REPORTING TEMPLATE - A  TEST(S) PERFORMED   Testing Performed on Block Number(s)  A1   Specimen Type  Biopsy / curettage   Appropriate Controls Verified  Yes        Estrogen Receptor (ER) Status  Positive   Percentage of Cells with Nuclear Positivity  95 %   Average Intensity of Staining  Moderate        Progesterone Receptor (PgR) Status  Negative (less than 1%)        HER2 Status  Negative (score 0)        Mismatch Repair (MMR) Protein Status     Nuclear MLH1 Expression  Intact   Mismatch Repair (MMR) Protein Status     Nuclear PMS2 Expression  Intact   Mismatch Repair (MMR) Protein Status     Nuclear MSH2 Expression  Intact   Mismatch Repair (MMR) Protein Status     Nuclear MSH6 Expression  Intact   Immunohistochemistry (IHC) Interpretation for Mismatch Repair (MMR) Proteins  No loss of nuclear expression of MMR proteins: low probability of microsatellite instability-high (MSI-H) phenotype        p53 Status  Abnormal expression (mutated)     Overexpression (strong, diffuse nuclear expression in greater than 90% of cells)          2/11/25 CT  IMPRESSION:   1.  No acute findings or other explanation for symptoms.  2.  Cholelithiasis.            Review of Systems:    Systemic           no weight changes; no fever; no chills; no night sweats; no appetite changes  Skin           no rashes, or lesions  Eye           no irritation; no changes in vision  Hao-Laryngeal           no dysphagia; no hoarseness   Pulmonary    no cough; no shortness of breath  Cardiovascular    no chest pain; no palpitations  Gastrointestinal    no diarrhea; no constipation; no abdominal pain; no changes in bowel  habits; no blood in stool  Genitourinary   no urinary frequency; no urinary urgency; no  dysuria; no pain; no abnormal vaginal discharge; no abnormal vaginal bleeding  Breast   no breast discharge; no breast changes; no breast pain  Musculoskeletal    no myalgias; no arthralgias; no back pain  Psychiatric           no depressed mood; no anxiety    Hematologic           no tender lymph nodes; no noticeable swellings or lumps   Endocrine    no hot flashes; no heat/cold intolerance         Neurological   no tremor; no numbness and tingling; no headaches; no difficulty  sleeping      Past Medical History:    Past Medical History:   Diagnosis Date    Abnormal finding on thyroid function test 11/10/2015    Hyperlipidemia     Hypertension     Neuropathy     Subclinical hypothyroidism 11/10/2015    Thyroid nodule 11/10/2015         Past Surgical History:  Past Surgical History:   Procedure Laterality Date    BREAST EXCISIONAL BIOPSY      CATARACT IOL, RT/LT      ENT SURGERY   Spring 2017    throat cyst removal    PHACOEMULSIFICATION CLEAR CORNEA WITH STANDARD INTRAOCULAR LENS IMPLANT  01/05/2012    Procedure:PHACOEMULSIFICATION CLEAR CORNEA WITH STANDARD INTRAOCULAR LENS IMPLANT; LEFT PHACOEMULSIFICATION CLEAR CORNEA WITH STANDARD LENS IMPLANT ; Surgeon:TUNDE GARDNER; Location: [facility]    VITRECTOMY ANTERIOR      vocal cord sugery               Health Maintenance:  Health Maintenance Due   Topic Date Due    RSV VACCINE (1 - 1-dose 75+ series) Never done    DTAP/TDAP/TD IMMUNIZATION (2 - Td or Tdap) 02/24/2023    COVID-19 Vaccine (8 - 2024-25 season) 03/16/2025       Last Pap Smear: remote    Last Mammogram: 10/2024 birads 1    Last Colonoscopy: 2013 polyps    Current Medications:     has a current medication list which includes the following prescription(s): amlodipine, aspirin, losartan, metoprolol succinate er, multiple vitamins-minerals, omeprazole, and pravastatin, and the following Facility-Administered Medications: sodium chloride (pf).       Allergies:     Seasonal allergies        Social History:      Social History     Tobacco Use    Smoking status: Never     Passive exposure: Never    Smokeless tobacco: Never   Substance Use Topics    Alcohol use: No     Alcohol/week: 0.0 standard drinks of alcohol       History   Drug Use No           Family History:     The patient's family history is notable for .    Family History   Problem Relation Age of Onset    Cancer Mother         unknown - had radiation implant (presumed uterine cancer)    Family History Negative Father     Breast Cancer No family hx of     Colon Cancer No family hx of     Ovarian Cancer No family hx of          Physical Exam:     PS 0  VS: LMP  (LMP Unknown)      General Appearance: healthy and alert, no distress; remarkably spry appearing     HEENT:  no thyromegaly, no palpable nodules or masses        Cardiovascular: regular rate and rhythm, no gallops, rubs or murmurs     Respiratory: lungs clear, no rales, rhonchi or wheezes, normal diaphragmatic excursion    Musculoskeletal: extremities non tender and without edema    Skin: no lesions or rashes     Neurological: normal gait, no gross defects     Psychiatric: appropriate mood and affect                               Hematological: normal cervical, supraclavicular and inguinal lymph nodes     Gastrointestinal:       abdomen soft, non-tender, non-distended, no organomegaly or masses    Genitourinary: External genitalia and urethral meatus appears normal.  Vagina is smooth without nodularity or masses.  Cervix appears normal and without lesions.  Bimanual exam reveal no masses, nodularity or fullness.  Recto-vaginal exam confirms these findings.      Assessment:    Vicky Kang is a 89 year old woman with a new diagnosis of serous uterine cancer.     A total of +60 minutes was spent with the patient, 40 minutes of which were spent in counseling the patient and/or treatment planning.      Plan:     1.)   EMCA  -  we have discussed the pathologic an clinical findings and she is aware of the  potential for malignancy that is worse than her current diagnosis.  We have discussed options including medical and surgical management. After a comprehensive discussion of the relative risks and benefits of each strategy she is inclined to surgery, which is reasonable.  She undertands that adjuvant treatment will likely me required after surgery in the form of chemotherapy, RT, or both.  Given the higher grade nature of the tumor, staging will be indicated in my estimation, and she understands that this could require laparotomy, especially if there is overt evidence of metastases.    WE have discussed the relative pros and cons of post-operative therapy in her age group, but will defer a decision on this until after surgery       2.) Genetic risk factors were assessed and the patient does not meet the qualifications for a referral.      3.) Labs and/or tests ordered include:  pre-op labs, ecg.     4.) Health maintenance issues addressed today include none.    5.) Pre-op teaching was completed today.  Risks of surgery were discussed to include: bleeding, transfusion, infection, unintentional injury to surrounding organs/structures.      Thank you for allowing us to participate in the care of your patient.         Sincerely,    Ramiro Yoder MD      CC  Patient Care Team:  Leatha Conroy MD as PCP - General (Internal Medicine)  Leatha Conroy MD as Assigned PCP  Ángel Meyers MD as MD (OB/Gyn)  Francoise Aguilar APRN CNP as Nurse Practitioner (Internal Medicine)  Ramiro Yoder MD as MD (Gynecologic Oncology)  FADY CRABTREE

## 2025-04-22 ENCOUNTER — HOSPITAL ENCOUNTER (OUTPATIENT)
Facility: CLINIC | Age: OVER 89
Discharge: HOME | End: 2025-04-23
Attending: OBSTETRICS & GYNECOLOGY | Admitting: OBSTETRICS & GYNECOLOGY
Payer: COMMERCIAL

## 2025-04-22 ENCOUNTER — ANESTHESIA EVENT (OUTPATIENT)
Dept: SURGERY | Facility: CLINIC | Age: OVER 89
End: 2025-04-22
Payer: COMMERCIAL

## 2025-04-22 ENCOUNTER — ANESTHESIA (OUTPATIENT)
Dept: SURGERY | Facility: CLINIC | Age: OVER 89
End: 2025-04-22
Payer: COMMERCIAL

## 2025-04-22 DIAGNOSIS — C55 UTERINE CARCINOMA (H): Primary | ICD-10-CM

## 2025-04-22 LAB
ABO + RH BLD: NORMAL
BLD GP AB SCN SERPL QL: NEGATIVE
GLUCOSE BLDC GLUCOMTR-MCNC: 126 MG/DL (ref 70–99)
SPECIMEN EXP DATE BLD: NORMAL

## 2025-04-22 PROCEDURE — 250N000009 HC RX 250: Performed by: NURSE ANESTHETIST, CERTIFIED REGISTERED

## 2025-04-22 PROCEDURE — 999N000141 HC STATISTIC PRE-PROCEDURE NURSING ASSESSMENT: Performed by: OBSTETRICS & GYNECOLOGY

## 2025-04-22 PROCEDURE — 88307 TISSUE EXAM BY PATHOLOGIST: CPT | Mod: 26 | Performed by: PATHOLOGY

## 2025-04-22 PROCEDURE — 710N000009 HC RECOVERY PHASE 1, LEVEL 1, PER MIN: Performed by: OBSTETRICS & GYNECOLOGY

## 2025-04-22 PROCEDURE — 250N000011 HC RX IP 250 OP 636: Performed by: NURSE ANESTHETIST, CERTIFIED REGISTERED

## 2025-04-22 PROCEDURE — 258N000003 HC RX IP 258 OP 636: Performed by: NURSE ANESTHETIST, CERTIFIED REGISTERED

## 2025-04-22 PROCEDURE — 88311 DECALCIFY TISSUE: CPT | Mod: 26 | Performed by: PATHOLOGY

## 2025-04-22 PROCEDURE — 258N000003 HC RX IP 258 OP 636

## 2025-04-22 PROCEDURE — 88311 DECALCIFY TISSUE: CPT | Mod: TC | Performed by: OBSTETRICS & GYNECOLOGY

## 2025-04-22 PROCEDURE — 36415 COLL VENOUS BLD VENIPUNCTURE: CPT | Performed by: ANESTHESIOLOGY

## 2025-04-22 PROCEDURE — 370N000017 HC ANESTHESIA TECHNICAL FEE, PER MIN: Performed by: OBSTETRICS & GYNECOLOGY

## 2025-04-22 PROCEDURE — 88112 CYTOPATH CELL ENHANCE TECH: CPT | Mod: TC | Performed by: OBSTETRICS & GYNECOLOGY

## 2025-04-22 PROCEDURE — 250N000011 HC RX IP 250 OP 636: Mod: JZ | Performed by: ANESTHESIOLOGY

## 2025-04-22 PROCEDURE — 120N000002 HC R&B MED SURG/OB UMMC

## 2025-04-22 PROCEDURE — 86901 BLOOD TYPING SEROLOGIC RH(D): CPT | Performed by: ANESTHESIOLOGY

## 2025-04-22 PROCEDURE — 272N000001 HC OR GENERAL SUPPLY STERILE: Performed by: OBSTETRICS & GYNECOLOGY

## 2025-04-22 PROCEDURE — 250N000009 HC RX 250: Performed by: OBSTETRICS & GYNECOLOGY

## 2025-04-22 PROCEDURE — 88309 TISSUE EXAM BY PATHOLOGIST: CPT | Mod: 26 | Performed by: PATHOLOGY

## 2025-04-22 PROCEDURE — 250N000011 HC RX IP 250 OP 636: Mod: JZ | Performed by: NURSE ANESTHETIST, CERTIFIED REGISTERED

## 2025-04-22 PROCEDURE — 250N000025 HC SEVOFLURANE, PER MIN: Performed by: OBSTETRICS & GYNECOLOGY

## 2025-04-22 PROCEDURE — 250N000011 HC RX IP 250 OP 636: Performed by: OBSTETRICS & GYNECOLOGY

## 2025-04-22 PROCEDURE — 88305 TISSUE EXAM BY PATHOLOGIST: CPT | Mod: 26 | Performed by: PATHOLOGY

## 2025-04-22 PROCEDURE — 88112 CYTOPATH CELL ENHANCE TECH: CPT | Mod: 26 | Performed by: PATHOLOGY

## 2025-04-22 PROCEDURE — 250N000013 HC RX MED GY IP 250 OP 250 PS 637: Performed by: OBSTETRICS & GYNECOLOGY

## 2025-04-22 PROCEDURE — 360N000077 HC SURGERY LEVEL 4, PER MIN: Performed by: OBSTETRICS & GYNECOLOGY

## 2025-04-22 PROCEDURE — 250N000011 HC RX IP 250 OP 636: Mod: JZ | Performed by: OBSTETRICS & GYNECOLOGY

## 2025-04-22 PROCEDURE — 250N000013 HC RX MED GY IP 250 OP 250 PS 637

## 2025-04-22 RX ORDER — PHENAZOPYRIDINE HYDROCHLORIDE 200 MG/1
200 TABLET, FILM COATED ORAL ONCE
Status: COMPLETED | OUTPATIENT
Start: 2025-04-22 | End: 2025-04-22

## 2025-04-22 RX ORDER — HYDROMORPHONE HCL IN WATER/PF 6 MG/30 ML
0.2 PATIENT CONTROLLED ANALGESIA SYRINGE INTRAVENOUS EVERY 5 MIN PRN
Status: DISCONTINUED | OUTPATIENT
Start: 2025-04-22 | End: 2025-04-22 | Stop reason: HOSPADM

## 2025-04-22 RX ORDER — ACETAMINOPHEN 325 MG/1
650 TABLET ORAL EVERY 6 HOURS
Status: DISCONTINUED | OUTPATIENT
Start: 2025-04-22 | End: 2025-04-23 | Stop reason: HOSPADM

## 2025-04-22 RX ORDER — HYDRALAZINE HYDROCHLORIDE 20 MG/ML
INJECTION INTRAMUSCULAR; INTRAVENOUS PRN
Status: DISCONTINUED | OUTPATIENT
Start: 2025-04-22 | End: 2025-04-22

## 2025-04-22 RX ORDER — METRONIDAZOLE 500 MG/100ML
500 INJECTION, SOLUTION INTRAVENOUS
Status: COMPLETED | OUTPATIENT
Start: 2025-04-22 | End: 2025-04-22

## 2025-04-22 RX ORDER — NALOXONE HYDROCHLORIDE 0.4 MG/ML
0.4 INJECTION, SOLUTION INTRAMUSCULAR; INTRAVENOUS; SUBCUTANEOUS
Status: DISCONTINUED | OUTPATIENT
Start: 2025-04-22 | End: 2025-04-23 | Stop reason: HOSPADM

## 2025-04-22 RX ORDER — PRAVASTATIN SODIUM 20 MG
20 TABLET ORAL AT BEDTIME
Status: DISCONTINUED | OUTPATIENT
Start: 2025-04-22 | End: 2025-04-23 | Stop reason: HOSPADM

## 2025-04-22 RX ORDER — FENTANYL CITRATE 50 UG/ML
50 INJECTION, SOLUTION INTRAMUSCULAR; INTRAVENOUS EVERY 5 MIN PRN
Status: DISCONTINUED | OUTPATIENT
Start: 2025-04-22 | End: 2025-04-22 | Stop reason: HOSPADM

## 2025-04-22 RX ORDER — LIDOCAINE HYDROCHLORIDE 20 MG/ML
INJECTION, SOLUTION INFILTRATION; PERINEURAL PRN
Status: DISCONTINUED | OUTPATIENT
Start: 2025-04-22 | End: 2025-04-22

## 2025-04-22 RX ORDER — NALOXONE HYDROCHLORIDE 0.4 MG/ML
0.2 INJECTION, SOLUTION INTRAMUSCULAR; INTRAVENOUS; SUBCUTANEOUS
Status: DISCONTINUED | OUTPATIENT
Start: 2025-04-22 | End: 2025-04-23 | Stop reason: HOSPADM

## 2025-04-22 RX ORDER — FENTANYL CITRATE 50 UG/ML
25 INJECTION, SOLUTION INTRAMUSCULAR; INTRAVENOUS EVERY 5 MIN PRN
Status: DISCONTINUED | OUTPATIENT
Start: 2025-04-22 | End: 2025-04-22 | Stop reason: HOSPADM

## 2025-04-22 RX ORDER — SODIUM CHLORIDE, SODIUM LACTATE, POTASSIUM CHLORIDE, CALCIUM CHLORIDE 600; 310; 30; 20 MG/100ML; MG/100ML; MG/100ML; MG/100ML
INJECTION, SOLUTION INTRAVENOUS CONTINUOUS PRN
Status: DISCONTINUED | OUTPATIENT
Start: 2025-04-22 | End: 2025-04-22

## 2025-04-22 RX ORDER — METOPROLOL SUCCINATE 50 MG/1
50 TABLET, EXTENDED RELEASE ORAL 2 TIMES DAILY
Status: DISCONTINUED | OUTPATIENT
Start: 2025-04-22 | End: 2025-04-23 | Stop reason: HOSPADM

## 2025-04-22 RX ORDER — PROPOFOL 10 MG/ML
INJECTION, EMULSION INTRAVENOUS PRN
Status: DISCONTINUED | OUTPATIENT
Start: 2025-04-22 | End: 2025-04-22

## 2025-04-22 RX ORDER — LIDOCAINE 40 MG/G
CREAM TOPICAL
Status: DISCONTINUED | OUTPATIENT
Start: 2025-04-22 | End: 2025-04-23 | Stop reason: HOSPADM

## 2025-04-22 RX ORDER — INDOCYANINE GREEN AND WATER 25 MG
KIT INJECTION PRN
Status: DISCONTINUED | OUTPATIENT
Start: 2025-04-22 | End: 2025-04-22 | Stop reason: HOSPADM

## 2025-04-22 RX ORDER — IBUPROFEN 200 MG
600 TABLET ORAL EVERY 6 HOURS
Status: DISCONTINUED | OUTPATIENT
Start: 2025-04-22 | End: 2025-04-23 | Stop reason: HOSPADM

## 2025-04-22 RX ORDER — ONDANSETRON 4 MG/1
4 TABLET, ORALLY DISINTEGRATING ORAL EVERY 6 HOURS PRN
Status: DISCONTINUED | OUTPATIENT
Start: 2025-04-22 | End: 2025-04-23 | Stop reason: HOSPADM

## 2025-04-22 RX ORDER — AMOXICILLIN 250 MG
1 CAPSULE ORAL 2 TIMES DAILY
Status: DISCONTINUED | OUTPATIENT
Start: 2025-04-22 | End: 2025-04-23 | Stop reason: HOSPADM

## 2025-04-22 RX ORDER — FENTANYL CITRATE 50 UG/ML
INJECTION, SOLUTION INTRAMUSCULAR; INTRAVENOUS PRN
Status: DISCONTINUED | OUTPATIENT
Start: 2025-04-22 | End: 2025-04-22

## 2025-04-22 RX ORDER — SODIUM CHLORIDE, SODIUM LACTATE, POTASSIUM CHLORIDE, CALCIUM CHLORIDE 600; 310; 30; 20 MG/100ML; MG/100ML; MG/100ML; MG/100ML
INJECTION, SOLUTION INTRAVENOUS CONTINUOUS
Status: DISCONTINUED | OUTPATIENT
Start: 2025-04-22 | End: 2025-04-23

## 2025-04-22 RX ORDER — NALOXONE HYDROCHLORIDE 0.4 MG/ML
0.1 INJECTION, SOLUTION INTRAMUSCULAR; INTRAVENOUS; SUBCUTANEOUS
Status: DISCONTINUED | OUTPATIENT
Start: 2025-04-22 | End: 2025-04-22 | Stop reason: HOSPADM

## 2025-04-22 RX ORDER — METOPROLOL TARTRATE 1 MG/ML
1-2 INJECTION, SOLUTION INTRAVENOUS EVERY 5 MIN PRN
Status: DISCONTINUED | OUTPATIENT
Start: 2025-04-22 | End: 2025-04-22 | Stop reason: HOSPADM

## 2025-04-22 RX ORDER — ONDANSETRON 2 MG/ML
INJECTION INTRAMUSCULAR; INTRAVENOUS PRN
Status: DISCONTINUED | OUTPATIENT
Start: 2025-04-22 | End: 2025-04-22

## 2025-04-22 RX ORDER — AMLODIPINE BESYLATE 5 MG/1
5 TABLET ORAL DAILY
Status: DISCONTINUED | OUTPATIENT
Start: 2025-04-22 | End: 2025-04-23 | Stop reason: HOSPADM

## 2025-04-22 RX ORDER — DEXAMETHASONE SODIUM PHOSPHATE 4 MG/ML
4 INJECTION, SOLUTION INTRA-ARTICULAR; INTRALESIONAL; INTRAMUSCULAR; INTRAVENOUS; SOFT TISSUE
Status: DISCONTINUED | OUTPATIENT
Start: 2025-04-22 | End: 2025-04-22 | Stop reason: HOSPADM

## 2025-04-22 RX ORDER — CEFAZOLIN SODIUM/WATER 2 G/20 ML
2 SYRINGE (ML) INTRAVENOUS
Status: COMPLETED | OUTPATIENT
Start: 2025-04-22 | End: 2025-04-22

## 2025-04-22 RX ORDER — ONDANSETRON 4 MG/1
4 TABLET, ORALLY DISINTEGRATING ORAL EVERY 30 MIN PRN
Status: DISCONTINUED | OUTPATIENT
Start: 2025-04-22 | End: 2025-04-22 | Stop reason: HOSPADM

## 2025-04-22 RX ORDER — OXYCODONE HYDROCHLORIDE 10 MG/1
10 TABLET ORAL EVERY 4 HOURS PRN
Status: DISCONTINUED | OUTPATIENT
Start: 2025-04-22 | End: 2025-04-23

## 2025-04-22 RX ORDER — AMOXICILLIN 250 MG
2 CAPSULE ORAL 2 TIMES DAILY
Status: DISCONTINUED | OUTPATIENT
Start: 2025-04-22 | End: 2025-04-23 | Stop reason: HOSPADM

## 2025-04-22 RX ORDER — HEPARIN SODIUM 5000 [USP'U]/.5ML
5000 INJECTION, SOLUTION INTRAVENOUS; SUBCUTANEOUS
Status: COMPLETED | OUTPATIENT
Start: 2025-04-22 | End: 2025-04-22

## 2025-04-22 RX ORDER — METOPROLOL TARTRATE 1 MG/ML
INJECTION, SOLUTION INTRAVENOUS PRN
Status: DISCONTINUED | OUTPATIENT
Start: 2025-04-22 | End: 2025-04-22

## 2025-04-22 RX ORDER — BISACODYL 10 MG
10 SUPPOSITORY, RECTAL RECTAL DAILY
Status: DISCONTINUED | OUTPATIENT
Start: 2025-04-23 | End: 2025-04-23 | Stop reason: HOSPADM

## 2025-04-22 RX ORDER — CEFAZOLIN SODIUM/WATER 2 G/20 ML
2 SYRINGE (ML) INTRAVENOUS SEE ADMIN INSTRUCTIONS
Status: DISCONTINUED | OUTPATIENT
Start: 2025-04-22 | End: 2025-04-22 | Stop reason: HOSPADM

## 2025-04-22 RX ORDER — BUPIVACAINE HYDROCHLORIDE 2.5 MG/ML
INJECTION, SOLUTION EPIDURAL; INFILTRATION; INTRACAUDAL; PERINEURAL PRN
Status: DISCONTINUED | OUTPATIENT
Start: 2025-04-22 | End: 2025-04-22 | Stop reason: HOSPADM

## 2025-04-22 RX ORDER — SIMETHICONE 80 MG
80 TABLET,CHEWABLE ORAL 4 TIMES DAILY PRN
Status: DISCONTINUED | OUTPATIENT
Start: 2025-04-22 | End: 2025-04-23 | Stop reason: HOSPADM

## 2025-04-22 RX ORDER — LOSARTAN POTASSIUM 50 MG/1
50 TABLET ORAL 2 TIMES DAILY
Status: DISCONTINUED | OUTPATIENT
Start: 2025-04-22 | End: 2025-04-23 | Stop reason: HOSPADM

## 2025-04-22 RX ORDER — OXYCODONE HYDROCHLORIDE 5 MG/1
5 TABLET ORAL EVERY 4 HOURS PRN
Status: DISCONTINUED | OUTPATIENT
Start: 2025-04-22 | End: 2025-04-23

## 2025-04-22 RX ORDER — SODIUM CHLORIDE, SODIUM LACTATE, POTASSIUM CHLORIDE, CALCIUM CHLORIDE 600; 310; 30; 20 MG/100ML; MG/100ML; MG/100ML; MG/100ML
INJECTION, SOLUTION INTRAVENOUS CONTINUOUS
Status: DISCONTINUED | OUTPATIENT
Start: 2025-04-22 | End: 2025-04-22 | Stop reason: HOSPADM

## 2025-04-22 RX ORDER — ONDANSETRON 2 MG/ML
4 INJECTION INTRAMUSCULAR; INTRAVENOUS EVERY 30 MIN PRN
Status: DISCONTINUED | OUTPATIENT
Start: 2025-04-22 | End: 2025-04-22 | Stop reason: HOSPADM

## 2025-04-22 RX ORDER — DEXAMETHASONE SODIUM PHOSPHATE 4 MG/ML
INJECTION, SOLUTION INTRA-ARTICULAR; INTRALESIONAL; INTRAMUSCULAR; INTRAVENOUS; SOFT TISSUE PRN
Status: DISCONTINUED | OUTPATIENT
Start: 2025-04-22 | End: 2025-04-22

## 2025-04-22 RX ADMIN — ONDANSETRON 4 MG: 2 INJECTION INTRAMUSCULAR; INTRAVENOUS at 16:35

## 2025-04-22 RX ADMIN — METOPROLOL TARTRATE 3 MG: 5 INJECTION INTRAVENOUS at 14:04

## 2025-04-22 RX ADMIN — Medication 20 MG: at 14:12

## 2025-04-22 RX ADMIN — FENTANYL CITRATE 50 MCG: 50 INJECTION INTRAMUSCULAR; INTRAVENOUS at 13:53

## 2025-04-22 RX ADMIN — PRAVASTATIN SODIUM 20 MG: 20 TABLET ORAL at 22:10

## 2025-04-22 RX ADMIN — METRONIDAZOLE 500 MG: 500 INJECTION, SOLUTION INTRAVENOUS at 12:09

## 2025-04-22 RX ADMIN — LIDOCAINE HYDROCHLORIDE 80 MG: 20 INJECTION, SOLUTION INFILTRATION; PERINEURAL at 13:53

## 2025-04-22 RX ADMIN — HYDROMORPHONE HYDROCHLORIDE 0.2 MG: 0.2 INJECTION, SOLUTION INTRAMUSCULAR; INTRAVENOUS; SUBCUTANEOUS at 17:41

## 2025-04-22 RX ADMIN — HYDROMORPHONE HYDROCHLORIDE 0.2 MG: 0.2 INJECTION, SOLUTION INTRAMUSCULAR; INTRAVENOUS; SUBCUTANEOUS at 17:58

## 2025-04-22 RX ADMIN — METOPROLOL TARTRATE 2 MG: 5 INJECTION INTRAVENOUS at 14:01

## 2025-04-22 RX ADMIN — FENTANYL CITRATE 50 MCG: 50 INJECTION, SOLUTION INTRAMUSCULAR; INTRAVENOUS at 17:08

## 2025-04-22 RX ADMIN — Medication 10 MG: at 15:39

## 2025-04-22 RX ADMIN — FENTANYL CITRATE 50 MCG: 50 INJECTION INTRAMUSCULAR; INTRAVENOUS at 14:04

## 2025-04-22 RX ADMIN — DEXAMETHASONE SODIUM PHOSPHATE 4 MG: 4 INJECTION, SOLUTION INTRA-ARTICULAR; INTRALESIONAL; INTRAMUSCULAR; INTRAVENOUS; SOFT TISSUE at 14:02

## 2025-04-22 RX ADMIN — METOPROLOL SUCCINATE 50 MG: 50 TABLET, EXTENDED RELEASE ORAL at 20:06

## 2025-04-22 RX ADMIN — SODIUM CHLORIDE, SODIUM LACTATE, POTASSIUM CHLORIDE, AND CALCIUM CHLORIDE: .6; .31; .03; .02 INJECTION, SOLUTION INTRAVENOUS at 18:00

## 2025-04-22 RX ADMIN — HEPARIN SODIUM 5000 UNITS: 5000 INJECTION, SOLUTION INTRAVENOUS; SUBCUTANEOUS at 12:05

## 2025-04-22 RX ADMIN — Medication 50 MG: at 13:53

## 2025-04-22 RX ADMIN — Medication 2 G: at 13:56

## 2025-04-22 RX ADMIN — IBUPROFEN 600 MG: 200 TABLET, FILM COATED ORAL at 20:05

## 2025-04-22 RX ADMIN — SODIUM CHLORIDE, SODIUM LACTATE, POTASSIUM CHLORIDE, AND CALCIUM CHLORIDE: .6; .31; .03; .02 INJECTION, SOLUTION INTRAVENOUS at 13:47

## 2025-04-22 RX ADMIN — HYDROMORPHONE HYDROCHLORIDE 0.2 MG: 0.2 INJECTION, SOLUTION INTRAMUSCULAR; INTRAVENOUS; SUBCUTANEOUS at 17:27

## 2025-04-22 RX ADMIN — HYDRALAZINE HYDROCHLORIDE 3 MG: 20 INJECTION INTRAMUSCULAR; INTRAVENOUS at 14:14

## 2025-04-22 RX ADMIN — ACETAMINOPHEN 650 MG: 325 TABLET, FILM COATED ORAL at 20:05

## 2025-04-22 RX ADMIN — PHENAZOPYRIDINE 200 MG: 200 TABLET ORAL at 12:05

## 2025-04-22 RX ADMIN — Medication 100 MG: at 16:45

## 2025-04-22 RX ADMIN — SENNOSIDES AND DOCUSATE SODIUM 2 TABLET: 50; 8.6 TABLET ORAL at 20:05

## 2025-04-22 RX ADMIN — PROPOFOL 80 MG: 10 INJECTION, EMULSION INTRAVENOUS at 13:53

## 2025-04-22 RX ADMIN — Medication 100 MG: at 16:43

## 2025-04-22 RX ADMIN — Medication 10 MG: at 16:34

## 2025-04-22 ASSESSMENT — ACTIVITIES OF DAILY LIVING (ADL)
ADLS_ACUITY_SCORE: 39
ADLS_ACUITY_SCORE: 41
ADLS_ACUITY_SCORE: 39
ADLS_ACUITY_SCORE: 39
ADLS_ACUITY_SCORE: 40
ADLS_ACUITY_SCORE: 40
ADLS_ACUITY_SCORE: 39
ADLS_ACUITY_SCORE: 40
ADLS_ACUITY_SCORE: 39
ADLS_ACUITY_SCORE: 39

## 2025-04-22 NOTE — ANESTHESIA CARE TRANSFER NOTE
Patient: Vicky Kang    Procedure: Procedure(s):  Laparoscopy, removal of uterus, tubes, ovaries, cervix, lymph nodes.  Look into Bladder       Diagnosis: Malignant neoplasm of uterus, unspecified site (H) [C55]  Diagnosis Additional Information: No value filed.    Anesthesia Type:   General     Note:    Oropharynx: oropharynx clear of all foreign objects  Level of Consciousness: awake  Oxygen Supplementation: face mask    Independent Airway: airway patency satisfactory and stable  Dentition: dentition unchanged  Vital Signs Stable: post-procedure vital signs reviewed and stable  Report to RN Given: handoff report given  Patient transferred to: PACU    Handoff Report: Identifed the Patient, Identified the Reponsible Provider, Reviewed the pertinent medical history, Discussed the surgical course, Reviewed Intra-OP anesthesia mangement and issues during anesthesia, Set expectations for post-procedure period and Allowed opportunity for questions and acknowledgement of understanding  Vitals:  Vitals Value Taken Time   /176 04/22/25 1657   Temp     Pulse 70 04/22/25 1702   Resp 23 04/22/25 1702   SpO2 93 % 04/22/25 1702   Vitals shown include unfiled device data.    Electronically Signed By: ODETTE Branham CRNA  April 22, 2025  5:02 PM   no

## 2025-04-22 NOTE — BRIEF OP NOTE
[facility]    Brief Operative Note    Pre-operative diagnosis: Malignant neoplasm of uterus, unspecified site (H) [C55]  Post-operative diagnosis Same as pre-operative diagnosis    Procedure: Laparoscopy, removal of uterus, tubes, ovaries, cervix, pelvic lymph nodes, omentectomy, Bilateral - Abdomen  Look into Bladder, N/A - Urethra    Surgeon: Surgeons and Role:     * Ramiro Yoder MD - Primary     * Baylee Rodriguez MD - Resident - Assisting  Anesthesia: General   Estimated Blood Loss: 50 mL from 4/22/2025  1:47 PM to 4/22/2025  4:55 PM  IVF: 800 mL  UOP: 350 mL     Drains: None  Specimens:   ID Type Source Tests Collected by Time Destination   1 : Pelvic washings Washings Pelvis NON-GYNECOLOGIC CYTOLOGY Ramiro Yoder MD 4/22/2025  1:30 PM    2 : Uterus, Cervix, Bilateral Fallopian Tubes & Ovaries Tissue Uterus, Cervix, Bilateral Fallopian Tubes & Ovaries SURGICAL PATHOLOGY EXAM Ramiro Yoder MD 4/22/2025  3:16 PM    3 : Omentum Tissue Omentum SURGICAL PATHOLOGY EXAM Ramiro Yoder MD 4/22/2025  3:32 PM    4 : Right pelvic lymph nodes Tissue Lymph Node(s) SURGICAL PATHOLOGY EXAM Rmairo Yoder MD 4/22/2025  3:37 PM    5 : Left pelvic lymph nodes Tissue Lymph Node(s), Pelvis, Left SURGICAL PATHOLOGY EXAM Ramiro Yoder MD 4/22/2025  3:48 PM      Findings: On laparoscopy, no evidence of entry. Upper abodminal survey with normal liver and diaphragm. Pelvis with physiologic adhesions to left rectosigmoid colon. Normal appearing uterus, bilateral fallopian tubes, and bilateral ovaries. Cystoscopy with brisk efflux from bilateral ureters. All pedicles hemostatic at end of case.     Complications: None apparent.     Full operative note to follow.     Baylee Rodriguez MD  Obstetrics and Gynecology, PGY-2  04/22/25 5:10 PM

## 2025-04-22 NOTE — ANESTHESIA POSTPROCEDURE EVALUATION
Patient: Vicky Kang    Procedure: Procedure(s):  Laparoscopy, removal of uterus, tubes, ovaries, cervix, pelvic lymph nodes, omentectomy  Look into Bladder       Anesthesia Type:  General    Note:  Disposition: Admission   Postop Pain Control: Uneventful            Sign Out: Well controlled pain   PONV: No   Neuro/Psych: Uneventful            Sign Out: Acceptable/Baseline neuro status   Airway/Respiratory: Uneventful            Sign Out: Acceptable/Baseline resp. status   CV/Hemodynamics: Uneventful            Sign Out: Acceptable CV status; No obvious hypovolemia; No obvious fluid overload   Other NRE: NONE   DID A NON-ROUTINE EVENT OCCUR? No       Last vitals:  Vitals Value Taken Time   /58 04/22/25 1745   Temp 36.9  C (98.4  F) 04/22/25 1700   Pulse 72 04/22/25 1751   Resp 12 04/22/25 1751   SpO2 99 % 04/22/25 1751   Vitals shown include unfiled device data.    Electronically Signed By: Gavin Villalpando MD  April 22, 2025  5:52 PM

## 2025-04-22 NOTE — ANESTHESIA PREPROCEDURE EVALUATION
Anesthesia Pre-Procedure Evaluation    Patient: Vicky Kang   MRN: [id number] : 1935        Procedure : Procedure(s):  Laparoscopy, removal of uterus, tubes, ovaries, cervix, lymph nodes.  Possible open suregry.  Possible cancer operation.  Look into Bladder          Past Medical History:   Diagnosis Date    Abnormal finding on thyroid function test 11/10/2015    Hyperlipidemia     Hypertension     Neuropathy     Subclinical hypothyroidism 11/10/2015    Thyroid nodule 11/10/2015      Past Surgical History:   Procedure Laterality Date    BREAST EXCISIONAL BIOPSY      CATARACT IOL, RT/LT      ENT SURGERY   Spring 2017    throat cyst removal    PHACOEMULSIFICATION CLEAR CORNEA WITH STANDARD INTRAOCULAR LENS IMPLANT  2012    Procedure:PHACOEMULSIFICATION CLEAR CORNEA WITH STANDARD INTRAOCULAR LENS IMPLANT; LEFT PHACOEMULSIFICATION CLEAR CORNEA WITH STANDARD LENS IMPLANT ; Surgeon:TUNDE GARDNER; Location: [facility]    VITRECTOMY ANTERIOR      vocal cord sugery        Allergies   Allergen Reactions    Seasonal Allergies       Social History     Tobacco Use    Smoking status: Never     Passive exposure: Never    Smokeless tobacco: Never   Substance Use Topics    Alcohol use: No     Alcohol/week: 0.0 standard drinks of alcohol      Wt Readings from Last 1 Encounters:   25 62 kg (136 lb 11 oz)        Anesthesia Evaluation            ROS/MED HX  ENT/Pulmonary:       Neurologic:     (+)              TIA, date: 2 years ago, features: difficulty speaking.                Cardiovascular:     (+)  hypertension- -   -  - -                                      METS/Exercise Tolerance:     Hematologic:       Musculoskeletal:       GI/Hepatic:    (-) liver disease   Renal/Genitourinary:    (-) renal disease   Endo: Comment: Subclinical hypothyroid    (+)          thyroid problem,            Psychiatric/Substance Use:       Infectious Disease:       Malignancy: Comment: Pelvic mass      Other:            Physical  "Exam    Airway        Mallampati: III   TM distance: < 3 FB   Neck ROM: full   Mouth opening: > 3 cm    Respiratory Devices and Support         Dental     Comment: Some chips and discoloration present     (+) Modest Abnormalities - crowns, retainers, 1 or 2 missing teeth      Cardiovascular          Rhythm and rate: regular and normal     Pulmonary           breath sounds clear to auscultation           OUTSIDE LABS:  CBC:   Lab Results   Component Value Date    WBC 8.0 04/10/2025    WBC 9.2 02/11/2025    HGB 15.0 04/10/2025    HGB 14.9 02/11/2025    HCT 44.2 04/10/2025    HCT 43.9 02/11/2025     04/10/2025     02/11/2025     BMP:   Lab Results   Component Value Date     04/10/2025     02/11/2025    POTASSIUM 3.9 04/10/2025    POTASSIUM 3.8 02/11/2025    CHLORIDE 103 04/10/2025    CHLORIDE 101 02/11/2025    CO2 28 04/10/2025    CO2 27 02/11/2025    BUN 11.7 04/10/2025    BUN 14.4 02/11/2025    CR 0.61 04/10/2025    CR 0.63 02/11/2025     (H) 04/22/2025     (H) 04/10/2025     COAGS: No results found for: \"PTT\", \"INR\", \"FIBR\"  POC: No results found for: \"BGM\", \"HCG\", \"HCGS\"  HEPATIC:   Lab Results   Component Value Date    ALBUMIN 4.5 04/10/2025    PROTTOTAL 7.5 04/10/2025    ALT 15 04/10/2025    AST 18 04/10/2025    ALKPHOS 87 04/10/2025    BILITOTAL 0.4 04/10/2025     OTHER:   Lab Results   Component Value Date    A1C 6.1 (H) 08/16/2024    REYNA 10.1 04/10/2025    MAG 2.2 03/10/2020    TSH 2.46 08/16/2024    T4 0.93 08/16/2015       Anesthesia Plan    ASA Status:  3    NPO Status:  NPO Appropriate    Anesthesia Type: General.     - Airway: ETT   Induction: Intravenous.   Maintenance: Balanced.   Techniques and Equipment:     - Lines/Monitors: 2nd IV     Consents    Anesthesia Plan(s) and associated risks, benefits, and realistic alternatives discussed. Questions answered and patient/representative(s) expressed understanding.     - Discussed:     - Discussed with:  Patient        " "    Postoperative Care    Pain management: IV analgesics.   PONV prophylaxis: Ondansetron (or other 5HT-3), Dexamethasone or Solumedrol     Comments:               Sindy Smart MD    Clinically Significant Risk Factors Present on Admission                 # Drug Induced Platelet Defect: home medication list includes an antiplatelet medication   # Hypertension: Noted on problem list           # Overweight: Estimated body mass index is 25.83 kg/m  as calculated from the following:    Height as of this encounter: 1.549 m (5' 1\").    Weight as of this encounter: 62 kg (136 lb 11 oz).                "

## 2025-04-22 NOTE — PHARMACY-ADMISSION MEDICATION HISTORY
Pharmacist Admission Medication History    Admission medication history is complete. The information provided in this note is only as accurate as the sources available at the time of the update.    Information Source(s):  Pre-op RN assessment, Dispense history (Surescripts)      Medication History Completed By: Cinda Sumner [facility] 4/22/2025 4:38 PM    PTA Med List   Medication Sig Note Last Dose/Taking    amLODIPine (NORVASC) 5 MG tablet TAKE 1 TABLET BY MOUTH EVERY DAY  4/22/2025 at  7:00 AM    aspirin 81 MG EC tablet Take 1 tablet (81 mg) by mouth daily 4/17/2025: On hold for surgery. Took last dose 4/13 or 4/14.  4/16/2025    losartan (COZAAR) 50 MG tablet Take 1 tablet (50 mg) by mouth 2 times daily 4/17/2025: HOLD  AM DOS 4/21/2025 Evening    metoprolol succinate ER (TOPROL XL) 50 MG 24 hr tablet Take 1 tablet (50 mg) by mouth 2 times daily  4/22/2025 at  7:00 AM    pravastatin (PRAVACHOL) 20 MG tablet Take 1 tablet (20 mg) by mouth daily (Patient taking differently: Take 20 mg by mouth at bedtime.)  4/21/2025 Bedtime

## 2025-04-22 NOTE — PROGRESS NOTES
Gynecologic Oncology Postoperative Check Note  4/22/2025    S: Vicky is doing well in the immediate post-operative period. Reports no abdominal pain, chest pain, or shortness of breath. Has not yet had any PO intake since surgery, but is planning on having some apple juice soon. Has not yet ambulated or voided. No current questions or concerns at this time.     O:  Vitals:    04/22/25 1758 04/22/25 1800 04/22/25 1815 04/22/25 1817   BP:  (!) 148/58 (!) 152/55 (!) 152/55   Pulse: 70 70 72 70   Resp: 22 15 15 17   Temp:  98.4  F (36.9  C)     TempSrc:  Oral     SpO2: 94% 96% 96% 96%   Weight:       Height:           Gen: Alert and oriented, in no acute distress  Cardio: Regular rate, well perfused  Resp: CTAB, no wheezing or crackles  Abdomen: soft, appropriately tender, minimally distended, incisions covered in bandages without saturation or surrounding erythema  Extremities: Warm, well perfused    Assessment/Plan: Vicky Kang is a 89 year old female who is POD#0 following total laparoscopic hysterectomy, bilateral salpingo-oophorectomy, omentectomy, bilateral pelvic lymph node dissection and cystoscopy in the setting of serous uterine carcinoma. She is doing well in the immediate post-operative period, but has not yet eaten, ambulated, or voided since surgery.     #Postoperative state  Dz: Uterine serous carcinoma (MMR intact, HER2 negative); final pathology pending at this time  Diet: Has not yet had any PO intake since surgery; plan to advance diet as tolerated. Will discontinue mIVF once tolerating adequate PO intake.   Pain: Tylenol, ibuprofen, prn oxycodone   : s/p oneill, has not yet voided  GI: Not yet passing flatus, continue prn bowel regimen  Ppx: SCDs, IS     #Hypertension  - Home metoprolol ordered; home amlodipine and losartan held given post-operative state, will restart pending blood pressures overnight    #Hyperlipidemia  - Home pravastatin ordered    Dispo: Anticipated POD#1    Baylee  MD Jennifer  Obstetrics and Gynecology, PGY-2  04/22/25 6:53 PM

## 2025-04-22 NOTE — ANESTHESIA PROCEDURE NOTES
Airway       Patient location during procedure: OR       Procedure Start/Stop Times: 4/22/2025 1:55 PM and 4/22/2025 1:56 PM  Staff -        Other Anesthesia Staff: Oren Cuenca       Performed By: SRNA  Consent for Airway        Urgency: elective  Indications and Patient Condition       Indications for airway management: may-procedural       Induction type:intravenous       Mask difficulty assessment: 2 - vent by mask + OA or adjuvant +/- NMBA    Final Airway Details       Final airway type: endotracheal airway       Successful airway: ETT - single  Endotracheal Airway Details        ETT size (mm): 7.0       Cuffed: yes       Successful intubation technique: direct laryngoscopy       DL Blade Type: MAC 3       Grade View of Cords: 1       Adjucts: stylet       Position: Right       Measured from: lips       Secured at (cm): 22       Bite block used: None    Post intubation assessment        Placement verified by: capnometry, equal breath sounds and chest rise        Number of attempts at approach: 1       Secured with: tape       Ease of procedure: easy       Dentition: Intact and Unchanged    Medication(s) Administered   Medication Administration Time: 4/22/2025 1:55 PM

## 2025-04-22 NOTE — DISCHARGE SUMMARY
Gynecologic Oncology Discharge Summary    Vicky Kang  [id number]    Admit Date: 4/22/2025  Discharge Date: 4/23/2025  Admitting Provider: Ramiro Yoder MD  Discharge Provider: Shirley Monge MD    Admission Dx:   - Uterine serous carcinoma  - Hypertension  - Hyperlipidemia  - Hypothyroidism  - History of TIA    Discharge Dx:  - Uterine serous carcinoma  - Hypertension  - Hyperlipidemia  - Hypothyroidism  - History of TIA    Patient Active Problem List   Diagnosis    Benign hypertension    Hyperlipidemia LDL goal <130    Neuropathy    Preventative health care    Osteopenia    Abnormal finding on thyroid function test    Thyroid nodule    Subclinical hypothyroidism    Pain of left thigh    TIA (transient ischemic attack)    Gallstones    Uterine carcinoma (H)     Procedures:   - Total laparoscopic hysterectomy, bilateral salpingo-oophorectomy, bilateral pelvic lymph node dissection, omentectomy, cystoscopy    Prior to Admission Medications:  Medications Prior to Admission   Medication Sig Dispense Refill Last Dose/Taking    amLODIPine (NORVASC) 5 MG tablet TAKE 1 TABLET BY MOUTH EVERY DAY 90 tablet 4 4/22/2025 at  7:00 AM    aspirin 81 MG EC tablet Take 1 tablet (81 mg) by mouth daily   4/16/2025    losartan (COZAAR) 50 MG tablet Take 1 tablet (50 mg) by mouth 2 times daily 180 tablet 4 4/21/2025 Evening    metoprolol succinate ER (TOPROL XL) 50 MG 24 hr tablet Take 1 tablet (50 mg) by mouth 2 times daily 180 tablet 4 4/22/2025 at  7:00 AM    pravastatin (PRAVACHOL) 20 MG tablet Take 1 tablet (20 mg) by mouth daily (Patient taking differently: Take 20 mg by mouth at bedtime.) 90 tablet 4 4/21/2025 Bedtime    omeprazole (PRILOSEC) 20 MG DR capsule TAKE 1 CAPSULE BY MOUTH EVERY DAY - TAKE WHILE ON ALEVE PER MD (Patient not taking: Reported on 4/17/2025) 90 capsule 1 Not Taking     Discharge Medications:     Review of your medicines        UNREVIEWED medicines. Ask your doctor about these medicines        Dose /  Directions   omeprazole 20 MG DR capsule  Commonly known as: PriLOSEC  Used for: Flank pain      TAKE 1 CAPSULE BY MOUTH EVERY DAY - TAKE WHILE ON ALEVE PER MD  Quantity: 90 capsule  Refills: 1            START taking        Dose / Directions   acetaminophen 325 MG tablet  Commonly known as: TYLENOL      Dose: 650 mg  Take 2 tablets (650 mg) by mouth every 6 hours as needed for mild pain.  Quantity: 60 tablet  Refills: 0     oxyCODONE 5 MG tablet  Commonly known as: ROXICODONE      Dose: 2.5-5 mg  Take 0.5-1 tablets (2.5-5 mg) by mouth every 6 hours as needed for severe pain.  Quantity: 6 tablet  Refills: 0     senna-docusate 8.6-50 MG tablet  Commonly known as: SENOKOT-S/PERICOLACE      Dose: 2 tablet  Take 2 tablets by mouth 2 times daily as needed for constipation.  Quantity: 60 tablet  Refills: 0            CONTINUE these medicines which may have CHANGED, or have new prescriptions. If we are uncertain of the size of tablets/capsules you have at home, strength may be listed as something that might have changed.        Dose / Directions   pravastatin 20 MG tablet  Commonly known as: PRAVACHOL  This may have changed: when to take this  Used for: Hyperlipidemia LDL goal <130      Dose: 20 mg  Take 1 tablet (20 mg) by mouth daily  Quantity: 90 tablet  Refills: 4            CONTINUE these medicines which have NOT CHANGED        Dose / Directions   amLODIPine 5 MG tablet  Commonly known as: NORVASC  Used for: HTN, goal below 150/90      TAKE 1 TABLET BY MOUTH EVERY DAY  Quantity: 90 tablet  Refills: 4     aspirin 81 MG EC tablet  Used for: TIA (transient ischemic attack)      Dose: 81 mg  Take 1 tablet (81 mg) by mouth daily  Refills: 0     losartan 50 MG tablet  Commonly known as: COZAAR  Used for: HTN, goal below 150/90      Dose: 50 mg  Take 1 tablet (50 mg) by mouth 2 times daily  Quantity: 180 tablet  Refills: 4     metoprolol succinate ER 50 MG 24 hr tablet  Commonly known as: TOPROL XL  Used for: HTN, goal below  150/90      Dose: 50 mg  Take 1 tablet (50 mg) by mouth 2 times daily  Quantity: 180 tablet  Refills: 4               Where to get your medicines        These medications were sent to [facility] - [place], [place] - [address]  [address] [address]      Phone: [phone]   acetaminophen 325 MG tablet  oxyCODONE 5 MG tablet  senna-docusate 8.6-50 MG tablet         Consultations:  - Anesthesia    Brief History of Illness:  Vicky Kang is a 89 year old female with recent diagnosis of serous uterine carcinoma on endometrial biopsy who presented for surgical management with the above procedure. She was admitted for post-operative recovery and will follow-up with Dr. Ydoer on 5/8/25 to discuss next steps.     Hospital Course:  Dz:   - Preoperative diagnosis was serous uterine carcinoma. Final pathology is pending at the time of discharge. She will follow-up postoperatively for a care plan with Dr. Yoder on 5/8/25.   FEN:   - She was maintained on IVF until POD#1, when her diet was slowly advanced. By discharge, she was tolerating a regular diet without nausea and vomiting and able to maintain her hydration without IVF supplementation.  Pain:   - Her pain was initially controlled on IV pain medications. Once tolerating PO pain meds, she was transitioned to a PO pain regimen.  Her pain was well controlled on this and she was discharged home with these medications.  CV:   - She has a history of hypertension and hyperlipidemia. For her hypertension, her home metoprolol was restarted on POD#0 and her home losartan and amlodipine were restarted on discharge. For her hyperlipidemia, her home rosuvastatin was restarted on POD#0. She has a history of a transient ischemic attack (TIA) in 2023 and has some resulting difficulties with speaking, but no gross deficits. Her vital signs were stable while in house and she had no acute CV issues.  PULM:   - She has no history of  pulmonary issues.  She was initially given O2 supplementation in to maintain her O2 sats in the immediate postop period and was transitioned off of this without difficulty.  By discharge, her O2 sats were greater than 94% on RA.  She was encouraged to use her bedside IS while in house.  She had no acute pulmonary issues while in house.  HEME:   - She had an appropriate drop in her hemoglobin after surgery and it was stable at the time of discharge. She had no acute heme issues while in house.  GI:   - She was made NPO prior to the procedure. On POD#0, her diet was advanced slowly as tolerated.  At the time of discharge, she was tolerating a regular diet without nausea and vomiting.  She was passing flatus prior to discharge. She will be discharged with a bowel regimen to prevent constipation in the postoperative period.  She had no acute GI issues while in house.   :    - A oneill catheter was placed at the time of the surgery and removed at the conclusion of the case. Prior to discharge, the patient was voiding spontaneously without difficulty.  She had no acute  issues while in house.  ID:   - The patient was AF during her hospitalization.  She received standard preoperative antibiotics without incident.    ENDO:   - Vicky clayton has a history of hypothyroidism, but does not take any medications daily.   PSYCH/NEURO:   - No issues.   PPX:    -  She was given SCDs and IS during her hospital course.  She tolerated these prophylactic interventions without incident. They were discontinued at the time of her discharge.    Discharge Instructions and Follow up:  Ms. Vicky Kang was discharged from the hospital with follow up for     Discharge Diet: Regular  Discharge Activity: Lifting restricted to 15 pounds, no driving while on narcotics, nothing per vagina for 6 weeks.  Discharge Follow up: Dr. Huitron 5/8/2025    Discharge Disposition:  Discharged to home with family    Discharge Staff:  Shirley Monge  MD Monica Keith, APRN, DNP, CNP  4/23/2025 8:26 AM

## 2025-04-23 VITALS
BODY MASS INDEX: 25.75 KG/M2 | RESPIRATION RATE: 16 BRPM | OXYGEN SATURATION: 94 % | HEIGHT: 61 IN | SYSTOLIC BLOOD PRESSURE: 112 MMHG | TEMPERATURE: 98.3 F | DIASTOLIC BLOOD PRESSURE: 49 MMHG | WEIGHT: 136.4 LBS | HEART RATE: 56 BPM

## 2025-04-23 LAB
BASOPHILS # BLD AUTO: 0 10E3/UL (ref 0–0.2)
BASOPHILS NFR BLD AUTO: 0 %
EOSINOPHIL # BLD AUTO: 0 10E3/UL (ref 0–0.7)
EOSINOPHIL NFR BLD AUTO: 0 %
ERYTHROCYTE [DISTWIDTH] IN BLOOD BY AUTOMATED COUNT: 12.4 % (ref 10–15)
GLUCOSE BLDC GLUCOMTR-MCNC: 145 MG/DL (ref 70–99)
HCT VFR BLD AUTO: 36.8 % (ref 35–47)
HGB BLD-MCNC: 12.5 G/DL (ref 11.7–15.7)
IMM GRANULOCYTES # BLD: 0.1 10E3/UL
IMM GRANULOCYTES NFR BLD: 0 %
LYMPHOCYTES # BLD AUTO: 1 10E3/UL (ref 0.8–5.3)
LYMPHOCYTES NFR BLD AUTO: 7 %
MCH RBC QN AUTO: 31.6 PG (ref 26.5–33)
MCHC RBC AUTO-ENTMCNC: 34 G/DL (ref 31.5–36.5)
MCV RBC AUTO: 93 FL (ref 78–100)
MONOCYTES # BLD AUTO: 1 10E3/UL (ref 0–1.3)
MONOCYTES NFR BLD AUTO: 7 %
NEUTROPHILS # BLD AUTO: 12.3 10E3/UL (ref 1.6–8.3)
NEUTROPHILS NFR BLD AUTO: 86 %
NRBC # BLD AUTO: 0 10E3/UL
NRBC BLD AUTO-RTO: 0 /100
PLATELET # BLD AUTO: 268 10E3/UL (ref 150–450)
RBC # BLD AUTO: 3.95 10E6/UL (ref 3.8–5.2)
WBC # BLD AUTO: 14.3 10E3/UL (ref 4–11)

## 2025-04-23 PROCEDURE — 85025 COMPLETE CBC W/AUTO DIFF WBC: CPT

## 2025-04-23 PROCEDURE — 36415 COLL VENOUS BLD VENIPUNCTURE: CPT

## 2025-04-23 PROCEDURE — 250N000013 HC RX MED GY IP 250 OP 250 PS 637

## 2025-04-23 RX ORDER — ACETAMINOPHEN 325 MG/1
650 TABLET ORAL EVERY 6 HOURS PRN
Qty: 60 TABLET | Refills: 0 | Status: SHIPPED | OUTPATIENT
Start: 2025-04-23

## 2025-04-23 RX ORDER — AMOXICILLIN 250 MG
2 CAPSULE ORAL 2 TIMES DAILY PRN
Qty: 60 TABLET | Refills: 0 | Status: SHIPPED | OUTPATIENT
Start: 2025-04-23

## 2025-04-23 RX ORDER — OXYCODONE HYDROCHLORIDE 5 MG/1
5 TABLET ORAL EVERY 6 HOURS PRN
Status: DISCONTINUED | OUTPATIENT
Start: 2025-04-23 | End: 2025-04-23 | Stop reason: HOSPADM

## 2025-04-23 RX ORDER — OXYCODONE HYDROCHLORIDE 5 MG/1
2.5-5 TABLET ORAL EVERY 6 HOURS PRN
Qty: 6 TABLET | Refills: 0 | Status: SHIPPED | OUTPATIENT
Start: 2025-04-23

## 2025-04-23 RX ADMIN — ACETAMINOPHEN 650 MG: 325 TABLET, FILM COATED ORAL at 08:01

## 2025-04-23 RX ADMIN — IBUPROFEN 600 MG: 200 TABLET, FILM COATED ORAL at 08:01

## 2025-04-23 RX ADMIN — ACETAMINOPHEN 650 MG: 325 TABLET, FILM COATED ORAL at 00:39

## 2025-04-23 RX ADMIN — IBUPROFEN 600 MG: 200 TABLET, FILM COATED ORAL at 00:39

## 2025-04-23 RX ADMIN — SENNOSIDES AND DOCUSATE SODIUM 2 TABLET: 50; 8.6 TABLET ORAL at 08:01

## 2025-04-23 RX ADMIN — METOPROLOL SUCCINATE 50 MG: 50 TABLET, EXTENDED RELEASE ORAL at 08:02

## 2025-04-23 ASSESSMENT — ACTIVITIES OF DAILY LIVING (ADL)
ADLS_ACUITY_SCORE: 41
ADLS_ACUITY_SCORE: 41
ADLS_ACUITY_SCORE: 39
ADLS_ACUITY_SCORE: 39
ADLS_ACUITY_SCORE: 41
ADLS_ACUITY_SCORE: 41
ADLS_ACUITY_SCORE: 39
ADLS_ACUITY_SCORE: 41
ADLS_ACUITY_SCORE: 39

## 2025-04-23 NOTE — PROGRESS NOTES
Nursing Focus: Discharge    D: Patient discharged to home at approximately 1230. Patient agreeable and ready for discharge, accompanied by daughter.    I: Discharge prescriptions sent to discharge pharmacy to be filled. All discharge medications and instructions reviewed with patient by bedside RN. Patient instructed to call clinic triage nurse if experiences a fever >100.4, uncontrolled nausea, vomiting, diarrhea, or pain; or experiences any signs or symptoms of bleeding. Other phone numbers to call with questions or concerns after discharge reviewed. PIV x2 removed. Education completed.    A: Patient verbalized understanding of discharge medications and instructions. Patient will  medications at discharge pharmacy.    P: Patient to follow-up in clinic, patient aware of discharge follow-up plan.

## 2025-04-23 NOTE — PLAN OF CARE
"Goal Outcome Evaluation:      Plan of Care Reviewed With: patient    Overall Patient Progress: improvingOverall Patient Progress: improving    Outcome Evaluation: pt is controlled. denies NV.  No bm or gas yet      Time    /45   Pulse 66   Temp 97.8  F (36.6  C) (Oral)   Resp 16   Ht 1.549 m (5' 1\")   Wt 62 kg (136 lb 11 oz)   LMP  (LMP Unknown)   SpO2 92%   BMI 25.83 kg/m      Reason for admission: Laparoscopy, removal of uterus, tubes, ovaries, cervix, pelvic lymph nodes, omentectom   Activity: A1, fall precaution. Pt was up to the restroom with help of her daughter  Pain: reports abdominal pain. Managed with Oxy. Reports good relieve  Neuro: alert and oriented  Cardiac: wnl, denies chest pain. On Capno  Respiratory: denies SOB, no distress observed  GI/: No bm or flatus, voids spontaneously  Diet: regular  Lines: PIV x 2  Wounds: midline incision, Lap sites x 4  Labs/imaging: please review lab in the chart      New changes this shift:     Plan:       Continue to monitor and follow POC    "

## 2025-04-23 NOTE — PROGRESS NOTES
Progress Note    Went to check on patient for evening rounds.   Feeling well after surgery. Pain well controlled. Was just up to void. No nausea, vomiting, dizziness or lightheadedness. No concerns at this time.     Thais Kaufman MD   OBGYN resident PGY4  04/22/2025 10:11 PM

## 2025-04-23 NOTE — PROGRESS NOTES
Gynecologic Oncology Daily Progress Note  4/23/2025    S: Vicky reports that she is doing well this morning. States that she has no pain. She was able to ambulate to the restroom last evening without lightheadedness or dizziness. Voiding spontaneously and passing flatus. Endorses she was able to tolerate apple sauce and ice cream overnight without nausea or vomiting. No chest pain, shortness of breath, or other systemic symptoms.     O:  Vitals:    04/22/25 2005 04/22/25 2134 04/22/25 2310 04/23/25 0314   BP: 130/71  125/51 122/52   BP Location:   Right arm Right arm   Pulse:   66 66   Resp:   16 16   Temp:   98  F (36.7  C) 97.8  F (36.6  C)   TempSrc:   Oral Oral   SpO2: 96% 96% 93% 93%   Weight:       Height:         Gen: Alert and oriented, in no acute distress  Cardio: Regular rate, well perfused  Resp: Non-labored breathing on room air  Abdomen: soft, appropriately tender, minimally distended, incisions covered in bandages without saturation or surrounding erythema  Extremities: Warm, well perfused; SCDs in place    Intake/Output: 2 unmeasured voids overnight per patient report    Assessment/Plan: Vicky Kang is a 89 year old female who is POD#1 following total laparoscopic hysterectomy, bilateral salpingo-oophorectomy, omentectomy, bilateral pelvic lymph node dissection and cystoscopy in the setting of serous uterine carcinoma. She is doing well in the post-operative period and is meeting all milestones for discharge to home.     #Postoperative state  Dz: Uterine serous carcinoma (MMR intact, HER2 negative); final pathology pending at this time  Diet: Tolerating regular diet without nausea or vomiting  Pain: Tylenol, ibuprofen, prn oxycodone   : voiding spontaneously s/p oneill  GI: passing flatus; continue prn bowel regimen  Ppx: SCDs, IS     #Hypertension  - Blood pressures overnight normotensive  - Home metoprolol ordered; home amlodipine and losartan held given post-operative state, will restart  these medications at time of patient's discharge    #Hyperlipidemia  - Home pravastatin ordered    Dispo: Anticipated today    Baylee Rodriguez MD  Obstetrics and Gynecology, PGY-2  04/23/25 5:58 AM

## 2025-04-23 NOTE — OP NOTE
DATE OF SURGERY: 4/22/2025    PREOPERATIVE DIAGNOSES: Malignant neoplasm of uterus    POSTOPERATIVE DIAGNOSES: Malignant neoplasm of uterus    OPERATIVE PROCEDURES: Total laparoscopic hysterectomy, bilateral pelvic lymph node dissection, omentectomy, cystoscopy     SURGEON: Ramiro Yoder MD     ASSISTANTS: Baylee Rodriguez MD PGY-2    ANESTHESIA: General endotracheal anesthesia     ESTIMATED BLOOD LOSS: 50 mL    TOTAL INTRAVENOUS FLUIDS: crystalloid 800 mL     TOTAL URINE OUTPUT: 350 mL, pyridium stained     SPECIMENS:   ID Type Source Tests Collected by Time Destination   1 : Pelvic washings Washings Pelvis NON-GYNECOLOGIC CYTOLOGY Ramiro Yoder MD 4/22/2025  1:30 PM    2 : Uterus, Cervix, Bilateral Fallopian Tubes & Ovaries Tissue Uterus, Cervix, Bilateral Fallopian Tubes & Ovaries SURGICAL PATHOLOGY EXAM Ramiro Yoder MD 4/22/2025  3:16 PM    3 : Omentum Tissue Omentum SURGICAL PATHOLOGY EXAM Ramiro Yoder MD 4/22/2025  3:32 PM    4 : Right pelvic lymph nodes Tissue Lymph Node(s) SURGICAL PATHOLOGY EXAM Ramiro Yoder MD 4/22/2025  3:37 PM    5 : Left pelvic lymph nodes Tissue Lymph Node(s), Pelvis, Left SURGICAL PATHOLOGY EXAM Ramiro Yoder MD 4/22/2025  3:48 PM         OPERATIVE FINDINGS:   On exam under anesthesia, mobile, small, and anteverted uterus without palpable adnexal masses. On laparoscopy, no evidence of injury from entry. Upper abodminal survey completed with normal liver edge, gallbladder, and diaphragm. Pelvis with physiologic adhesions to left rectosigmoid colon. Normal appearing uterus, bilateral fallopian tubes, and bilateral ovaries, removed in their entirety with creation of hemostatic pedicles. No identifiable sentinel lymph node bialterally, thus bilateral lymphadenectomy performed. Cystoscopy with brisk efflux from bilateral ureters. All surgical sites hemostatic at end of case.    COMPLICATIONS: None apparent.     CONDITION:  Stable to PACU.     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 [facility]. Treatment options were reviewed and the patient ultimately consented to the above procedure.    OPERATIVE PROCEDURE IN DETAIL:    After obtaining informed consent, the patient was brought to the operating room where adequate general anesthesia was administered.  She was placed in the dorsal lithotomy position. She was prepped and draped in the usual sterile fashion, and oneill catheter was placed. The umbilicus was everted, and a 5 mm stab incision was made. The Veress needle was placed, and intraperitoneal placement was suggested by an opening pressure of less than 5 mmHg.  The abdomen was then insufflated to a maximum pressure of 15 mmHg.  The Veress needle was removed and a 5 mm trocar was placed.  The laparoscope was placed, and survey of the abdomen revealed the findings noted above. No trauma from entry was noted. There was no overt evidence of metastatic disease.  Attention was turned to the left lower quadrant. At a point 2 cm superomedial to the ASIS, a 12 mm incision was made, and a 12 mm trocar was placed under direct visualization.  Attention was then turned to the right lower quadrant. Similarly, at a point 2 cm superomedial to the ASIS, a 5 mm incision was made, and a 5 mm trocar was placed under direct visualization. A subsequent assist port was placed in the left lower quadrant approximately 3 cm medial to the 12 mm port. The patient was placed in steep Trendelenburg position.    Attention was turned to the vagina. The cervix was visualized and serially dilated without difficulty. A Koh ring and JESSICA uterine manipulator were placed. The cervix was injected with indocyanine green (ICG) dye.    Attention was then turned to the pelvis where the right round ligament was then grasped  and transected using the tips of the monopolar scissors. The posterior leaf of the broad ligament was then incised, the ureter clearly identified and a window created to isolate the infundibulopelvic ligament. The right infundibulopelvic ligament was then divided using the Ligasure device. The anterior leaf of the broad ligament was then incised along the bladder reflection to the midline. The left round ligament was then also transection using the monopolar scissors. In a similar fashion, the left infundibulopelvic ligament was then isolated, the ureter on the left identified and the IP divided using the Ligasure device. From this side, the anterior leaf of the broad ligament was incised along the bladder reflection to the midline. A bladder flap was mobilized and the peritoneum on the vesicouterine fold was incised to mobilize the bladder. The uterine arteries were then transected and ligated using the Ligasure device, down to the level of the colpotomy ring. The vagina was transected along the Koh cup using the monopolar scissors, resulting in separation of the uterus and attached tubes and ovaries. The uterus, tubes, and ovaries were then delivered through the vagina, and the pneumo-occluder balloon was reinserted to maintain pneumoperitoneum.     Attention was then turned to the omentectomy. The omentum was isolated from its attachments to the transverse colon and carefully dissected using the Monopolar scissors and the Ligasure device. Once ligated, the omentum was removed through the vagina and the pneumo-occluder balloon was again reinserted to maintain pneumoperitoneum.    No obvious sentinel lymph nodes were identified with the ICG dye, thus decision was made to perform bilateral pelvic lymphadenectomy. Attention was first turned to the right paravesical space, which was carefully dissected with visualization of the external iliac vessels and the ureter. The right pelvic lymph nodes were subsequently  dissected using blunt dissection as well as the monopolar scissors. The right pelvic lymph nodes were then removed under direct visualization. This was then repeated on the left side.     The vaginal vault was closed with interrupted figure-of-eight stitches of 0-Vicryl using the EndoStitch device. The abdomen was irrigated, and excellent hemostasis was noted from all surgical pedicles.     The oneill catheter was then removed and the cystoscope was placed in the bladder. Efflux was noted from bilateral ureteral orifices. A complete survey of the bladder revealed no injury.    The 12 mm fascial incision was closed with an 0 Vicryl suture using the Faisal-Chan device.  All skin incisions were closed using 4-0 Monocryl, and covered with steri strips and a sterile dressing.    All sponge, needle, and instrument counts were correct. The patient tolerated the procedure well and was transferred to recovery in stable condition. Dr. Yoder was present and scrubbed for the entire procedure.     Baylee Rodriguez MD  Obstetrics and Gynecology, PGY-2  04/22/25 8:45 PM

## 2025-04-23 NOTE — PLAN OF CARE
Nursing Focus: Admission    Patient admitted from PACU at the end of the shift. Skin check was done with Evaristo WHEELER RN. Pt has 4 abdominal lap sites, and a bruise on the R shin, otherwise skin WDL. LR infusing via PIV @ 50ml/hr. Abdomen distended, patient reports it is normal for her. Reporting pain 2/10. Denies nausea. Tolerating apple juice and applesauce. Family at bedside.

## 2025-04-24 ENCOUNTER — PATIENT OUTREACH (OUTPATIENT)
Dept: ONCOLOGY | Facility: CLINIC | Age: OVER 89
End: 2025-04-24
Payer: COMMERCIAL

## 2025-04-24 LAB
PATH REPORT.COMMENTS IMP SPEC: NORMAL
PATH REPORT.COMMENTS IMP SPEC: NORMAL
PATH REPORT.FINAL DX SPEC: NORMAL
PATH REPORT.GROSS SPEC: NORMAL
PATH REPORT.MICROSCOPIC SPEC OTHER STN: NORMAL
PATH REPORT.RELEVANT HX SPEC: NORMAL

## 2025-04-24 NOTE — PROGRESS NOTES
[facility]: Transitions of Care Note  Chief Complaint   Patient presents with    Clinic Care Coordination - Post Hospital     Post-op call     Most Recent Admission Date: 4/22/2025   Most Recent Admission Diagnosis: Malignant neoplasm of uterus, unspecified site (H) - C55  Uterine carcinoma (H) - C55     Most Recent Discharge Date: 4/23/2025   Most Recent Discharge Diagnosis: Uterine carcinoma (H) - C55; S/P surgery on 4/22/25 (Total laparoscopic hysterectomy, bilateral salpingo-oophorectomy, bilateral pelvic lymph node dissection, omentectomy, cystoscopy)    Transitions of Care Assessment    Discharge Assessment (assessment completed with patient's daughter)  How are you doing now that you are home?: Daughter states patient is doing really well so far.  Patient was taking a nap at the time of writer's phone call.  How are your symptoms? (Red Flag symptoms escalate to triage hotline per guidelines): Improved  Do you know how to contact your clinic care team if you have future questions or changes to your health status? : Yes  Does the patient have their discharge instructions? : Yes  Does the patient have questions regarding their discharge instructions? : No  Were you started on any new medications or were there changes to any of your previous medications? : Yes  Does the patient have all of their medications?: Yes  Do you have questions regarding any of your medications? : No    Post-op (Clinicians Only)  Did the patient have surgery or a procedure: Yes  Incision: closed, healing  Drainage: No  Bleeding: none (Patient did have scant vaginal bleeding/spotting shortly after surgery, but this has resolved.)  Fever: No  Chills: No  Redness: No  Warmth: No  Swelling: No  Incision site pain: Yes (Per daughter, pain is well-controlled by alternating Tylenol and ibuprofen.  Patient has not needed to use oxycodone so far.)  Closure: suture, dissolving, other (Steri-strips)  Eating & Drinking: eating and drinking  without complaints/concerns  PO Intake: regular diet  Additional Symptoms:  (No nausea or vomiting.  Per daughter, patient is eating and drinking very well.)  Bowel Function: normal  Date of last BM: 04/24/25  Urinary Status: voiding without complaint/concerns    Follow up Plan     Discharge Follow-Up  Discharge follow up appointment scheduled in alignment with recommended follow up timeframe or Transitions of Risk Category? (Low = within 30 days; Moderate= within 14 days; High= within 7 days): Yes  Discharge Follow Up Appointment Date: 05/08/25  Discharge Follow Up Appointment Scheduled with?: Specialty Care Provider (Dr. Yoder)    Future Appointments   Date Time Provider Department Center   5/8/2025  9:15 AM Ramiro Yoder MD [facility]     For any urgent concerns, please contact our 24 hour clinic line:   [place]: [phone]     Agustin Hernandez, RN, BSN, OCN  RN Care Coordinator - Oncology  [facility]     30-Jan-2020 05:43

## 2025-05-08 ENCOUNTER — ONCOLOGY VISIT (OUTPATIENT)
Dept: ONCOLOGY | Facility: CLINIC | Age: OVER 89
End: 2025-05-08
Attending: OBSTETRICS & GYNECOLOGY
Payer: COMMERCIAL

## 2025-05-08 VITALS
DIASTOLIC BLOOD PRESSURE: 62 MMHG | SYSTOLIC BLOOD PRESSURE: 154 MMHG | BODY MASS INDEX: 25.66 KG/M2 | HEART RATE: 63 BPM | HEIGHT: 61 IN | RESPIRATION RATE: 16 BRPM | OXYGEN SATURATION: 96 % | WEIGHT: 135.9 LBS

## 2025-05-08 DIAGNOSIS — C55 UTERINE CARCINOMA (H): Primary | ICD-10-CM

## 2025-05-08 DIAGNOSIS — C54.9 MALIGNANT NEOPLASM OF CORPUS UTERI, UNSPECIFIED (H): ICD-10-CM

## 2025-05-08 LAB
CANCER AG125 SERPL-ACNC: 72 U/ML
PATH REPORT.COMMENTS IMP SPEC: ABNORMAL
PATH REPORT.COMMENTS IMP SPEC: YES
PATH REPORT.FINAL DX SPEC: ABNORMAL
PATH REPORT.GROSS SPEC: ABNORMAL
PATH REPORT.MICROSCOPIC SPEC OTHER STN: ABNORMAL
PATH REPORT.RELEVANT HX SPEC: ABNORMAL
PATHOLOGY SYNOPTIC REPORT: ABNORMAL
PHOTO IMAGE: ABNORMAL

## 2025-05-08 PROCEDURE — 81479 UNLISTED MOLECULAR PATHOLOGY: CPT | Performed by: OBSTETRICS & GYNECOLOGY

## 2025-05-08 PROCEDURE — G0463 HOSPITAL OUTPT CLINIC VISIT: HCPCS | Performed by: OBSTETRICS & GYNECOLOGY

## 2025-05-08 PROCEDURE — G0452 MOLECULAR PATHOLOGY INTERPR: HCPCS | Mod: 26 | Performed by: STUDENT IN AN ORGANIZED HEALTH CARE EDUCATION/TRAINING PROGRAM

## 2025-05-08 PROCEDURE — 86304 IMMUNOASSAY TUMOR CA 125: CPT | Performed by: OBSTETRICS & GYNECOLOGY

## 2025-05-08 ASSESSMENT — PAIN SCALES - GENERAL: PAINLEVEL_OUTOF10: NO PAIN (0)

## 2025-05-08 NOTE — PROGRESS NOTES
"                        Consult Notes on Referred Patient      Dr. Tomer Garcia MD  [address]  [place] [address]       RE: Vicky Kang  : 1935  LORENE: 2025     Dear Dr. Tomer Garcia:    I had the pleasure of seeing your patient Vicky Kang here at the [facility] at the [facility].  As you know she is a very pleasant 89 year old woman with a recent diagnosis of  serous uterine cancer.  Given these findings she was subsequently sent to the [facility] Cancer Clinic for new patient consultation.     3/24/25 Saw Dr. Corby Garcia, who reported the following:    \"Patient is a 89-year-old P2 who presents with postmenopausal bleeding.  She initially had a UTI that noted to have blood in it.  And the suggestion was to have a CT scan looking for a stone.  She did have that CT scan which then demonstrated a uterus with a thickened endometrial lining.  Following that pelvic ultrasound was done which showed the lining to be approximately 11 mm in thickness.     She and I talked about this and though the bleeding was vague it seemed to be clearly coming vaginally rather than rectally.  Additionally is a 89-year-old she is very active and articulate.  She is a [place] descent.  And delightful personality.  We talked about the uterine anatomy.  And the reason for possibly doing an endometrial biopsy.  I diagrammed the uterus and explained where the bleeding typically comes from additionally talked about the pathology that could be involved and the rational for doing endometrial sample but then also the rationale for doing an endometrial D&C.  She understood and asked appropriate questions.\"    Dr. Garcia astutely performed and endometrial biopsy which demonstrated the following:    Final Diagnosis   A.  Endometrium, biopsy:  -SEROUS CARCINOMA, p53 mutant.  -Additional ancillary testing:       -MMR IHC:  Intact       -HER2 IHC:  Negative  -See Gynecologic " Biomarker Reporting Template       Electronically signed by Renay Martines MD on 3/28/2025 at 11:51 AM   Synoptic Checklist   Gynecologic Biomarker Reporting Template   Protocol posted: 3/22/2023GYNECOLOGIC BIOMARKER REPORTING TEMPLATE - A  TEST(S) PERFORMED   Testing Performed on Block Number(s)  A1   Specimen Type  Biopsy / curettage   Appropriate Controls Verified  Yes        Estrogen Receptor (ER) Status  Positive   Percentage of Cells with Nuclear Positivity  95 %   Average Intensity of Staining  Moderate        Progesterone Receptor (PgR) Status  Negative (less than 1%)        HER2 Status  Negative (score 0)        Mismatch Repair (MMR) Protein Status     Nuclear MLH1 Expression  Intact   Mismatch Repair (MMR) Protein Status     Nuclear PMS2 Expression  Intact   Mismatch Repair (MMR) Protein Status     Nuclear MSH2 Expression  Intact   Mismatch Repair (MMR) Protein Status     Nuclear MSH6 Expression  Intact   Immunohistochemistry (IHC) Interpretation for Mismatch Repair (MMR) Proteins  No loss of nuclear expression of MMR proteins: low probability of microsatellite instability-high (MSI-H) phenotype        p53 Status  Abnormal expression (mutated)     Overexpression (strong, diffuse nuclear expression in greater than 90% of cells)          2/11/25 CT  IMPRESSION:   1.  No acute findings or other explanation for symptoms.  2.  Cholelithiasis.    WE discussed options in light of her age and performance status.  She was interested in surgery to alleviate the bleeding, but conflicted about adjuvant therapy - which is reasonable.    4/22/25 TLH/BSO and staging - path pending     Review of Systems:    Systemic           no weight changes; no fever; no chills; no night sweats; no appetite changes  Skin           no rashes, or lesions  Eye           no irritation; no changes in vision  Hao-Laryngeal           no dysphagia; no hoarseness   Pulmonary    no cough; no shortness of breath  Cardiovascular    no chest pain;  no palpitations  Gastrointestinal    no diarrhea; no constipation; no abdominal pain; no changes in bowel  habits; no blood in stool  Genitourinary   no urinary frequency; no urinary urgency; no dysuria; no pain; no abnormal vaginal discharge; no abnormal vaginal bleeding  Breast   no breast discharge; no breast changes; no breast pain  Musculoskeletal    no myalgias; no arthralgias; no back pain  Psychiatric           no depressed mood; no anxiety    Hematologic           no tender lymph nodes; no noticeable swellings or lumps   Endocrine    no hot flashes; no heat/cold intolerance         Neurological   no tremor; no numbness and tingling; no headaches; no difficulty  sleeping      Past Medical History:    Past Medical History:   Diagnosis Date    Abnormal finding on thyroid function test 11/10/2015    Hyperlipidemia     Hypertension     Neuropathy     Subclinical hypothyroidism 11/10/2015    Thyroid nodule 11/10/2015         Past Surgical History:  Past Surgical History:   Procedure Laterality Date    BREAST EXCISIONAL BIOPSY      CATARACT IOL, RT/LT      CYSTOSCOPY N/A 4/22/2025    Procedure: Cystoscopy;  Surgeon: Ramiro Yoder MD;  Location: [facility] OR    ENT SURGERY   Spring 2017    throat cyst removal    LAPAROSCOPIC HYSTERECTOMY TOTAL, BILATERAL SALPINGO-OOPHORECTOMY, NODE DISSECTION, COMBINED Bilateral 4/22/2025    Procedure: Laparoscopy, removal of uterus, tubes, ovaries, cervix, pelvic lymph nodes, omentectomy;  Surgeon: Ramiro Yoder MD;  Location: [facility]    PHACOEMULSIFICATION CLEAR CORNEA WITH STANDARD INTRAOCULAR LENS IMPLANT  01/05/2012    Procedure:PHACOEMULSIFICATION CLEAR CORNEA WITH STANDARD INTRAOCULAR LENS IMPLANT; LEFT PHACOEMULSIFICATION CLEAR CORNEA WITH STANDARD LENS IMPLANT ; Surgeon:TUNDE GARDNER; Location: [facility]    VITRECTOMY ANTERIOR      vocal cord sugery               Health Maintenance:  Health Maintenance Due   Topic Date Due    RSV VACCINE (1 - 1-dose 75+  "series) Never done    DTAP/TDAP/TD IMMUNIZATION (2 - Td or Tdap) 02/24/2023    COVID-19 Vaccine (8 - 2024-25 season) 03/16/2025       Last Pap Smear: remote    Last Mammogram: 10/2024 birads 1    Last Colonoscopy: 2013 polyps    Current Medications:     has a current medication list which includes the following prescription(s): acetaminophen, amlodipine, aspirin, losartan, metoprolol succinate er, omeprazole, oxycodone, pravastatin, and senna-docusate.       Allergies:     Seasonal allergies        Social History:     Social History     Tobacco Use    Smoking status: Never     Passive exposure: Never    Smokeless tobacco: Never   Substance Use Topics    Alcohol use: No     Alcohol/week: 0.0 standard drinks of alcohol       History   Drug Use No           Family History:     The patient's family history is notable for .    Family History   Problem Relation Age of Onset    Cancer Mother         unknown - had radiation implant (presumed uterine cancer)    Family History Negative Father     Breast Cancer No family hx of     Colon Cancer No family hx of     Ovarian Cancer No family hx of          Physical Exam:     PS 0  VS: BP (!) 184/69   Pulse 63   Resp 16   Ht 1.549 m (5' 0.98\")   Wt 61.6 kg (135 lb 14.4 oz)   LMP  (LMP Unknown)   SpO2 96%   BMI 25.69 kg/m     Repeat 154/62    General Appearance: healthy and alert, no distress; remarkably spry appearing     HEENT:  no thyromegaly, no palpable nodules or masses        Cardiovascular: regular rate and rhythm, no gallops, rubs or murmurs     Respiratory: lungs clear, no rales, rhonchi or wheezes, normal diaphragmatic excursion    Musculoskeletal: extremities non tender and without edema    Skin: no lesions or rashes     Neurological: normal gait, no gross defects     Psychiatric: appropriate mood and affect                               Hematological: normal cervical, supraclavicular and inguinal lymph nodes     Gastrointestinal:       abdomen soft, non-tender, " non-distended, no organomegaly or masses    Genitourinary: External genitalia and urethral meatus appears normal.  Vagina is smooth without nodularity or masses.  Cervix appears normal and without lesions.  Bimanual exam reveal no masses, nodularity or fullness.  Recto-vaginal exam confirms these findings.      Assessment:    Vicky Kang is a 89 year old woman with a new diagnosis of serous uterine cancer.     A total of +60 minutes was spent with the patient, 40 minutes of which were spent in counseling the patient and/or treatment planning.      Plan:     1.)   [facility]  -  we have discussed the pathologic an clinical findings.  She is recovering nicely from surgery and is off pain medication with normal bowel function.  We discussed options from observation through chemo therapy including RT.  She is aware that serous cancer is usually treated even at stage I in out practice, but that some patients do not recur even when untreated.  She has otherwise favorable findings on pathology.    After considering these options in light of her advanced age, but also high performance status, she is inclined to observe for now, which is certainly reasonable.  She did not commit to or decline treatment should she have evidence of recurrence.       2.) Genetic risk factors were assessed and the patient does not meet the qualifications for a referral.      3.) Labs and/or tests ordered include:   baseline     4.) Health maintenance issues addressed today include none - plan to follow with primary MD regarding BP (she reports she was distressed by the ride in and construction)        Thank you for allowing us to participate in the care of your patient.         Sincerely,    Ramiro Yoder MD      CC  Patient Care Team:  Leatha Conroy MD as PCP - General (Internal Medicine)  Leatha Conroy MD as Assigned PCP  Ángel Meyers MD as MD (OB/Gyn)  Francoise Aguilar APRN CNP as Nurse  Practitioner (Internal Medicine)  Ramiro Yoder MD as MD (Gynecologic Oncology)  Tomer Garcia MD as Assigned OBGYN Provider  Ramiro Yoder MD as Assigned Cancer Care Provider  TOMER GARCIA

## 2025-05-08 NOTE — LETTER
"2025      Vicky Kang  [address]  [facility] [address]      Dear Colleague,    Thank you for referring your patient, Vicky Kang, to the [facility]. Please see a copy of my visit note below.                            Consult Notes on Referred Patient      Dr. Tomer Garcia MD  [address] [facility] [address]  [place] [address]       RE: Vicky Kang  : 1935  LORENE: 2025     Dear Dr. Tomer Garcia:    I had the pleasure of seeing your patient Vicky Kang here at the Gynecologic Cancer Clinic at the [facility].  As you know she is a very pleasant 89 year old woman with a recent diagnosis of  serous uterine cancer.  Given these findings she was subsequently sent to the Gynecologic Cancer Clinic for new patient consultation.     3/24/25 Saw Dr. Corby Garcia, who reported the following:    \"Patient is a 89-year-old P2 who presents with postmenopausal bleeding.  She initially had a UTI that noted to have blood in it.  And the suggestion was to have a CT scan looking for a stone.  She did have that CT scan which then demonstrated a uterus with a thickened endometrial lining.  Following that pelvic ultrasound was done which showed the lining to be approximately 11 mm in thickness.     She and I talked about this and though the bleeding was vague it seemed to be clearly coming vaginally rather than rectally.  Additionally is a 89-year-old she is very active and articulate.  She is a [place] descent.  And delightful personality.  We talked about the uterine anatomy.  And the reason for possibly doing an endometrial biopsy.  I diagrammed the uterus and explained where the bleeding typically comes from additionally talked about the pathology that could be involved and the rational for doing endometrial sample but then also the rationale for doing an endometrial D&C.  She understood and asked appropriate questions.\"    Dr. Garcia " astutely performed and endometrial biopsy which demonstrated the following:    Final Diagnosis   A.  Endometrium, biopsy:  -SEROUS CARCINOMA, p53 mutant.  -Additional ancillary testing:       -MMR IHC:  Intact       -HER2 IHC:  Negative  -See Gynecologic Biomarker Reporting Template       Electronically signed by Renay Martines MD on 3/28/2025 at 11:51 AM   Synoptic Checklist   Gynecologic Biomarker Reporting Template   Protocol posted: 3/22/2023GYNECOLOGIC BIOMARKER REPORTING TEMPLATE - A  TEST(S) PERFORMED   Testing Performed on Block Number(s)  A1   Specimen Type  Biopsy / curettage   Appropriate Controls Verified  Yes        Estrogen Receptor (ER) Status  Positive   Percentage of Cells with Nuclear Positivity  95 %   Average Intensity of Staining  Moderate        Progesterone Receptor (PgR) Status  Negative (less than 1%)        HER2 Status  Negative (score 0)        Mismatch Repair (MMR) Protein Status     Nuclear MLH1 Expression  Intact   Mismatch Repair (MMR) Protein Status     Nuclear PMS2 Expression  Intact   Mismatch Repair (MMR) Protein Status     Nuclear MSH2 Expression  Intact   Mismatch Repair (MMR) Protein Status     Nuclear MSH6 Expression  Intact   Immunohistochemistry (IHC) Interpretation for Mismatch Repair (MMR) Proteins  No loss of nuclear expression of MMR proteins: low probability of microsatellite instability-high (MSI-H) phenotype        p53 Status  Abnormal expression (mutated)     Overexpression (strong, diffuse nuclear expression in greater than 90% of cells)          2/11/25 CT  IMPRESSION:   1.  No acute findings or other explanation for symptoms.  2.  Cholelithiasis.    WE discussed options in light of her age and performance status.  She was interested in surgery to alleviate the bleeding, but conflicted about adjuvant therapy - which is reasonable.    4/22/25 TLH/BSO and staging - path pending     Review of Systems:    Systemic           no weight changes; no fever; no chills; no  night sweats; no appetite changes  Skin           no rashes, or lesions  Eye           no irritation; no changes in vision  Hao-Laryngeal           no dysphagia; no hoarseness   Pulmonary    no cough; no shortness of breath  Cardiovascular    no chest pain; no palpitations  Gastrointestinal    no diarrhea; no constipation; no abdominal pain; no changes in bowel  habits; no blood in stool  Genitourinary   no urinary frequency; no urinary urgency; no dysuria; no pain; no abnormal vaginal discharge; no abnormal vaginal bleeding  Breast   no breast discharge; no breast changes; no breast pain  Musculoskeletal    no myalgias; no arthralgias; no back pain  Psychiatric           no depressed mood; no anxiety    Hematologic           no tender lymph nodes; no noticeable swellings or lumps   Endocrine    no hot flashes; no heat/cold intolerance         Neurological   no tremor; no numbness and tingling; no headaches; no difficulty  sleeping      Past Medical History:    Past Medical History:   Diagnosis Date     Abnormal finding on thyroid function test 11/10/2015     Hyperlipidemia      Hypertension      Neuropathy      Subclinical hypothyroidism 11/10/2015     Thyroid nodule 11/10/2015         Past Surgical History:  Past Surgical History:   Procedure Laterality Date     BREAST EXCISIONAL BIOPSY       CATARACT IOL, RT/LT       CYSTOSCOPY N/A 4/22/2025    Procedure: Cystoscopy;  Surgeon: Ramiro Yoder MD;  Location: [facility] OR     ENT SURGERY   Spring 2017    throat cyst removal     LAPAROSCOPIC HYSTERECTOMY TOTAL, BILATERAL SALPINGO-OOPHORECTOMY, NODE DISSECTION, COMBINED Bilateral 4/22/2025    Procedure: Laparoscopy, removal of uterus, tubes, ovaries, cervix, pelvic lymph nodes, omentectomy;  Surgeon: Ramiro Yoder MD;  Location:  OR     PHACOEMULSIFICATION CLEAR CORNEA WITH STANDARD INTRAOCULAR LENS IMPLANT  01/05/2012    Procedure:PHACOEMULSIFICATION CLEAR CORNEA WITH STANDARD INTRAOCULAR LENS  "IMPLANT; LEFT PHACOEMULSIFICATION CLEAR CORNEA WITH STANDARD LENS IMPLANT ; Surgeon:TUNDE GARDNER; Location:[facility]     VITRECTOMY ANTERIOR       vocal cord sugery               Health Maintenance:  Health Maintenance Due   Topic Date Due     RSV VACCINE (1 - 1-dose 75+ series) Never done     DTAP/TDAP/TD IMMUNIZATION (2 - Td or Tdap) 02/24/2023     COVID-19 Vaccine (8 - 2024-25 season) 03/16/2025       Last Pap Smear: remote    Last Mammogram: 10/2024 birads 1    Last Colonoscopy: 2013 polyps    Current Medications:     has a current medication list which includes the following prescription(s): acetaminophen, amlodipine, aspirin, losartan, metoprolol succinate er, omeprazole, oxycodone, pravastatin, and senna-docusate.       Allergies:     Seasonal allergies        Social History:     Social History     Tobacco Use     Smoking status: Never     Passive exposure: Never     Smokeless tobacco: Never   Substance Use Topics     Alcohol use: No     Alcohol/week: 0.0 standard drinks of alcohol       History   Drug Use No           Family History:     The patient's family history is notable for .    Family History   Problem Relation Age of Onset     Cancer Mother         unknown - had radiation implant (presumed uterine cancer)     Family History Negative Father      Breast Cancer No family hx of      Colon Cancer No family hx of      Ovarian Cancer No family hx of          Physical Exam:     PS 0  VS: BP (!) 184/69   Pulse 63   Resp 16   Ht 1.549 m (5' 0.98\")   Wt 61.6 kg (135 lb 14.4 oz)   LMP  (LMP Unknown)   SpO2 96%   BMI 25.69 kg/m     Repeat 154/62    General Appearance: healthy and alert, no distress; remarkably spry appearing     HEENT:  no thyromegaly, no palpable nodules or masses        Cardiovascular: regular rate and rhythm, no gallops, rubs or murmurs     Respiratory: lungs clear, no rales, rhonchi or wheezes, normal diaphragmatic excursion    Musculoskeletal: extremities non tender and without " edema    Skin: no lesions or rashes     Neurological: normal gait, no gross defects     Psychiatric: appropriate mood and affect                               Hematological: normal cervical, supraclavicular and inguinal lymph nodes     Gastrointestinal:       abdomen soft, non-tender, non-distended, no organomegaly or masses    Genitourinary: External genitalia and urethral meatus appears normal.  Vagina is smooth without nodularity or masses.  Cervix appears normal and without lesions.  Bimanual exam reveal no masses, nodularity or fullness.  Recto-vaginal exam confirms these findings.      Assessment:    Vicky Kang is a 89 year old woman with a new diagnosis of serous uterine cancer.     A total of +60 minutes was spent with the patient, 40 minutes of which were spent in counseling the patient and/or treatment planning.      Plan:     1.)   EMCA  -  we have discussed the pathologic an clinical findings.  She is recovering nicely from surgery and is off pain medication with normal bowel function.  We discussed options from observation through chemo therapy including RT.  She is aware that serous cancer is usually treated even at stage I in out practice, but that some patients do not recur even when untreated.  She has otherwise favorable findings on pathology.    After considering these options in light of her advanced age, but also high performance status, she is inclined to observe for now, which is certainly reasonable.  She did not commit to or decline treatment should she have evidence of recurrence.       2.) Genetic risk factors were assessed and the patient does not meet the qualifications for a referral.      3.) Labs and/or tests ordered include:   baseline     4.) Health maintenance issues addressed today include none - plan to follow with primary MD regarding BP (she reports she was distressed by the ride in and construction)        Thank you for allowing us to participate in the care of your  patient.         Sincerely,    Ramiro Yoder MD      CC  Patient Care Team:  Leatha Conroy MD as PCP - General (Internal Medicine)  Leatha Conroy MD as Assigned PCP  Ángel Meyers MD as MD (OB/Gyn)  Francoise Aguilar APRN CNP as Nurse Practitioner (Internal Medicine)  Ramiro Yoder MD as MD (Gynecologic Oncology)  Fady Garcia MD as Assigned OBGYN Provider  Ramiro Yoder MD as Assigned Cancer Care Provider  FADY GARCIA        Again, thank you for allowing me to participate in the care of your patient.        Sincerely,        Ramiro Yoder MD    Electronically signed

## 2025-05-08 NOTE — NURSING NOTE
"Oncology Rooming Note    May 8, 2025 9:08 AM   Vicky Kang is a 89 year old female who presents for:    Chief Complaint   Patient presents with    Oncology Clinic Visit     Post-op DOS 4/22     Initial Vitals: BP (!) 184/69   Pulse 63   Resp 16   Ht 1.549 m (5' 0.98\")   Wt 61.6 kg (135 lb 14.4 oz)   LMP  (LMP Unknown)   SpO2 96%   BMI 25.69 kg/m   Estimated body mass index is 25.69 kg/m  as calculated from the following:    Height as of this encounter: 1.549 m (5' 0.98\").    Weight as of this encounter: 61.6 kg (135 lb 14.4 oz). Body surface area is 1.63 meters squared.  No Pain (0) Comment: Data Unavailable   No LMP recorded (lmp unknown). Patient is postmenopausal.  Allergies reviewed: Yes  Medications reviewed: Yes    Medications: Medication refills not needed today.  Pharmacy name entered into [facility]: [id number] - [place], [place] - [address]    Frailty Screening:   Is the patient here for a new oncology consult visit in cancer care? 2. No    PHQ9:  Did this patient require a PHQ9?: No      Clinical concerns: vaginal itching and gas       Corinne Lake LPN              "

## 2025-05-09 ENCOUNTER — RESULTS FOLLOW-UP (OUTPATIENT)
Dept: ONCOLOGY | Facility: CLINIC | Age: OVER 89
End: 2025-05-09

## 2025-05-25 NOTE — NURSING NOTE
"Oncology Rooming Note    April 10, 2025 3:46 PM   Vicky Kang is a 89 year old female who presents for:    Chief Complaint   Patient presents with    Oncology Clinic Visit     Initial Vitals: BP (!) 181/67 (BP Location: Left arm)   Pulse 66   LMP  (LMP Unknown)   SpO2 94%  Estimated body mass index is 25.28 kg/m  as calculated from the following:    Height as of 3/17/25: 1.562 m (5' 1.5\").    Weight as of 3/24/25: 61.7 kg (136 lb). There is no height or weight on file to calculate BSA.  No Pain (0) Comment: Data Unavailable   No LMP recorded (lmp unknown). Patient is postmenopausal.  Allergies reviewed: Yes  Medications reviewed: Yes    Medications: Medication refills not needed today.  Pharmacy name entered into [email or web address]: [id number] - [facility] [id number]    Frailty Screening:   Is the patient here for a new oncology consult visit in cancer care? 1. Yes. Over the past month, have you experienced difficulty or required a caregiver to assist with:   1. Balance, walking or general mobility (including any falls)? NO  2. Completion of self-care tasks such as bathing, dressing, toileting, grooming/hygiene?  NO  3. Concentration or memory that affects your daily life?  NO     PHQ9:  Did this patient require a PHQ9?: No      Clinical concerns: Patient will discuss concerns with provider.       Da Lewis MA            "
Refer to the paper Trauma Flowsheet documentation

## 2025-07-21 ENCOUNTER — PATIENT OUTREACH (OUTPATIENT)
Dept: CARE COORDINATION | Facility: CLINIC | Age: OVER 89
End: 2025-07-21
Payer: COMMERCIAL

## 2025-08-04 ENCOUNTER — PATIENT OUTREACH (OUTPATIENT)
Dept: CARE COORDINATION | Facility: CLINIC | Age: OVER 89
End: 2025-08-04
Payer: COMMERCIAL

## 2025-08-14 ENCOUNTER — ONCOLOGY VISIT (OUTPATIENT)
Dept: ONCOLOGY | Facility: CLINIC | Age: OVER 89
End: 2025-08-14
Attending: OBSTETRICS & GYNECOLOGY
Payer: COMMERCIAL

## 2025-08-14 ENCOUNTER — LAB (OUTPATIENT)
Dept: INFUSION THERAPY | Facility: CLINIC | Age: OVER 89
End: 2025-08-14
Attending: OBSTETRICS & GYNECOLOGY
Payer: COMMERCIAL

## 2025-08-14 VITALS
RESPIRATION RATE: 16 BRPM | DIASTOLIC BLOOD PRESSURE: 75 MMHG | HEART RATE: 71 BPM | WEIGHT: 137 LBS | OXYGEN SATURATION: 94 % | BODY MASS INDEX: 25.9 KG/M2 | SYSTOLIC BLOOD PRESSURE: 172 MMHG

## 2025-08-14 DIAGNOSIS — C55 UTERINE CARCINOMA (H): ICD-10-CM

## 2025-08-14 DIAGNOSIS — C54.1 MALIGNANT NEOPLASM OF ENDOMETRIUM (H): ICD-10-CM

## 2025-08-14 DIAGNOSIS — R97.1 ELEVATED CANCER ANTIGEN 125 (CA 125): ICD-10-CM

## 2025-08-14 LAB
CANCER AG125 SERPL-ACNC: 16 U/ML
HOLD SPECIMEN: NORMAL
HOLD SPECIMEN: NORMAL

## 2025-08-14 PROCEDURE — G0463 HOSPITAL OUTPT CLINIC VISIT: HCPCS | Performed by: OBSTETRICS & GYNECOLOGY

## 2025-08-14 PROCEDURE — 86304 IMMUNOASSAY TUMOR CA 125: CPT

## 2025-08-14 ASSESSMENT — PAIN SCALES - GENERAL: PAINLEVEL_OUTOF10: NO PAIN (0)

## 2025-09-04 ENCOUNTER — OFFICE VISIT (OUTPATIENT)
Dept: INTERNAL MEDICINE | Facility: CLINIC | Age: OVER 89
End: 2025-09-04
Payer: COMMERCIAL

## 2025-09-04 VITALS
WEIGHT: 137.5 LBS | TEMPERATURE: 97.5 F | BODY MASS INDEX: 25.96 KG/M2 | SYSTOLIC BLOOD PRESSURE: 138 MMHG | HEIGHT: 61 IN | RESPIRATION RATE: 16 BRPM | OXYGEN SATURATION: 93 % | DIASTOLIC BLOOD PRESSURE: 60 MMHG | HEART RATE: 71 BPM

## 2025-09-04 DIAGNOSIS — E78.5 HYPERLIPIDEMIA LDL GOAL <130: ICD-10-CM

## 2025-09-04 DIAGNOSIS — R60.0 BILATERAL LEG EDEMA: ICD-10-CM

## 2025-09-04 DIAGNOSIS — C55 UTERINE CARCINOMA (H): ICD-10-CM

## 2025-09-04 DIAGNOSIS — I10 HTN, GOAL BELOW 150/90: ICD-10-CM

## 2025-09-04 DIAGNOSIS — R73.9 BLOOD SUGAR INCREASED: ICD-10-CM

## 2025-09-04 DIAGNOSIS — Z00.00 ROUTINE GENERAL MEDICAL EXAMINATION AT A HEALTH CARE FACILITY: Primary | ICD-10-CM

## 2025-09-04 DIAGNOSIS — Z13.6 SCREENING FOR CARDIOVASCULAR CONDITION: ICD-10-CM

## 2025-09-04 DIAGNOSIS — E03.8 SUBCLINICAL HYPOTHYROIDISM: ICD-10-CM

## 2025-09-04 DIAGNOSIS — Z23 NEED FOR VACCINATION: ICD-10-CM

## 2025-09-04 RX ORDER — FUROSEMIDE 20 MG/1
20 TABLET ORAL DAILY PRN
Qty: 30 TABLET | Refills: 1 | Status: SHIPPED | OUTPATIENT
Start: 2025-09-04

## 2025-09-04 RX ORDER — LOSARTAN POTASSIUM 50 MG/1
50 TABLET ORAL 2 TIMES DAILY
Qty: 180 TABLET | Refills: 4 | Status: SHIPPED | OUTPATIENT
Start: 2025-09-04

## 2025-09-04 RX ORDER — PRAVASTATIN SODIUM 20 MG
20 TABLET ORAL DAILY
Qty: 90 TABLET | Refills: 4 | Status: SHIPPED | OUTPATIENT
Start: 2025-09-04

## 2025-09-04 RX ORDER — METOPROLOL SUCCINATE 50 MG/1
50 TABLET, EXTENDED RELEASE ORAL 2 TIMES DAILY
Qty: 180 TABLET | Refills: 4 | Status: SHIPPED | OUTPATIENT
Start: 2025-09-04

## 2025-09-04 RX ORDER — AMLODIPINE BESYLATE 5 MG/1
TABLET ORAL
Qty: 90 TABLET | Refills: 4 | Status: SHIPPED | OUTPATIENT
Start: 2025-09-04

## 2025-09-04 SDOH — HEALTH STABILITY: PHYSICAL HEALTH: ON AVERAGE, HOW MANY DAYS PER WEEK DO YOU ENGAGE IN MODERATE TO STRENUOUS EXERCISE (LIKE A BRISK WALK)?: 3 DAYS

## 2025-09-04 ASSESSMENT — PAIN SCALES - GENERAL: PAINLEVEL_OUTOF10: NO PAIN (0)

## (undated) DEVICE — TUBING IRRIG CYSTO/BLADDER SET 81" LF 2C4040

## (undated) DEVICE — ENDO TROCAR SLEEVE KII Z-THREADED 05X100MM CTS02

## (undated) DEVICE — ESU GROUND PAD ADULT W/CORD E7507

## (undated) DEVICE — SU VICRYL+ 0 54 UNDYED VCP608H

## (undated) DEVICE — LINEN TOWEL PACK X6 WHITE 5487

## (undated) DEVICE — DEVICE ENDO STITCH APPLIER 10MM 173016

## (undated) DEVICE — ESU ENDO SCISSORS 5MM CVD 5DCS

## (undated) DEVICE — ESU HOLDER LAP INST DISP PURPLE LONG 330MM H-PRO-330

## (undated) DEVICE — TUBING SMOKE EVAC PNEUMOCLEAR HIGH FLOW 0620050250

## (undated) DEVICE — Device

## (undated) DEVICE — PREP POVIDONE-IODINE 7.5% SCRUB 4OZ BOTTLE MDS093945

## (undated) DEVICE — GLOVE GAMMEX NEOPRENE ULTRA SZ 7.5 LF 8515

## (undated) DEVICE — ENDO TROCAR BLADELESS 12X100MM B12LT

## (undated) DEVICE — SOL WATER IRRIG 1000ML BOTTLE 2F7114

## (undated) DEVICE — PREP CHLORAPREP 26ML TINTED HI-LITE ORANGE 930815

## (undated) DEVICE — KIT PATIENT POSITIONING PIGAZZI LATEX FREE 40580

## (undated) DEVICE — SU VICRYL 2-0 CT-1 27" UND J259H

## (undated) DEVICE — LINEN TOWEL PACK X30 5481

## (undated) DEVICE — SU MONOCRYL 4-0 PS-2 27" UND Y426H

## (undated) DEVICE — DRAPE SHEET REV FOLD 3/4 9349

## (undated) DEVICE — NEEDLE SPINAL DISP 22GA X 3.5" QUINCKE 333320

## (undated) DEVICE — UTERINE MANIPULATOR RUMI 6.7MMX10CM UMG670

## (undated) DEVICE — SOL NACL 0.9% IRRIG 1000ML BOTTLE 2F7124

## (undated) DEVICE — SU ENDO STITCH POLYSORB 2-0 ES-9 48"  170053

## (undated) DEVICE — SUCTION MANIFOLD NEPTUNE 2 SYS 4 PORT 0702-020-000

## (undated) DEVICE — SYR 10ML FINGER CONTROL W/O NDL 309695

## (undated) DEVICE — ENDO TROCAR FIRST ENTRY KII FIOS Z-THRD 05X100MM CTF03

## (undated) DEVICE — SOL NACL 0.9% INJ 1000ML BAG 2B1324X

## (undated) DEVICE — SUCTION IRR STRYKERFLOW II W/TIP 250-070-520

## (undated) DEVICE — PREP POVIDONE-IODINE 10% SOLUTION 4OZ BOTTLE MDS093944

## (undated) DEVICE — SPECIMEN TRAP MUCOUS 40ML LUKI C30200A

## (undated) DEVICE — DRSG TELFA 3X8" 1238

## (undated) DEVICE — DEVICE SUTURE PASSER 14GA WECK EFX EFXSP2

## (undated) DEVICE — NDL INSUFFLATION 13GA 120MM C2201

## (undated) DEVICE — KOH COLPOTOMIZER OCCLUDER  CPO-6

## (undated) RX ORDER — BUPIVACAINE HYDROCHLORIDE 2.5 MG/ML
INJECTION, SOLUTION EPIDURAL; INFILTRATION; INTRACAUDAL; PERINEURAL
Status: DISPENSED
Start: 2025-04-22

## (undated) RX ORDER — FENTANYL CITRATE 50 UG/ML
INJECTION, SOLUTION INTRAMUSCULAR; INTRAVENOUS
Status: DISPENSED
Start: 2025-04-22

## (undated) RX ORDER — INDOCYANINE GREEN AND WATER 25 MG
KIT INJECTION
Status: DISPENSED
Start: 2025-04-22

## (undated) RX ORDER — METRONIDAZOLE 500 MG/100ML
INJECTION, SOLUTION INTRAVENOUS
Status: DISPENSED
Start: 2025-04-22

## (undated) RX ORDER — HEPARIN SODIUM 1000 [USP'U]/ML
INJECTION, SOLUTION INTRAVENOUS; SUBCUTANEOUS
Status: DISPENSED
Start: 2025-04-22

## (undated) RX ORDER — PHENAZOPYRIDINE HYDROCHLORIDE 200 MG/1
TABLET, FILM COATED ORAL
Status: DISPENSED
Start: 2025-04-22

## (undated) RX ORDER — HYDROMORPHONE HCL IN WATER/PF 6 MG/30 ML
PATIENT CONTROLLED ANALGESIA SYRINGE INTRAVENOUS
Status: DISPENSED
Start: 2025-04-22

## (undated) RX ORDER — HEPARIN SODIUM 5000 [USP'U]/.5ML
INJECTION, SOLUTION INTRAVENOUS; SUBCUTANEOUS
Status: DISPENSED
Start: 2025-04-22

## (undated) RX ORDER — SODIUM CHLORIDE, SODIUM LACTATE, POTASSIUM CHLORIDE, CALCIUM CHLORIDE 600; 310; 30; 20 MG/100ML; MG/100ML; MG/100ML; MG/100ML
INJECTION, SOLUTION INTRAVENOUS
Status: DISPENSED
Start: 2025-04-22